# Patient Record
Sex: MALE | Race: WHITE | NOT HISPANIC OR LATINO
[De-identification: names, ages, dates, MRNs, and addresses within clinical notes are randomized per-mention and may not be internally consistent; named-entity substitution may affect disease eponyms.]

---

## 2020-03-15 ENCOUNTER — TRANSCRIPTION ENCOUNTER (OUTPATIENT)
Age: 60
End: 2020-03-15

## 2020-03-16 ENCOUNTER — RESULT REVIEW (OUTPATIENT)
Age: 60
End: 2020-03-16

## 2020-03-16 ENCOUNTER — INPATIENT (INPATIENT)
Facility: HOSPITAL | Age: 60
LOS: 8 days | Discharge: HOME | DRG: 853 | End: 2020-03-25
Attending: INTERNAL MEDICINE | Admitting: INTERNAL MEDICINE
Payer: COMMERCIAL

## 2020-03-16 VITALS
DIASTOLIC BLOOD PRESSURE: 68 MMHG | WEIGHT: 238.98 LBS | SYSTOLIC BLOOD PRESSURE: 107 MMHG | TEMPERATURE: 100 F | HEIGHT: 73 IN | HEART RATE: 120 BPM | OXYGEN SATURATION: 97 % | RESPIRATION RATE: 18 BRPM

## 2020-03-16 DIAGNOSIS — L08.9 LOCAL INFECTION OF THE SKIN AND SUBCUTANEOUS TISSUE, UNSPECIFIED: ICD-10-CM

## 2020-03-16 DIAGNOSIS — Z29.9 ENCOUNTER FOR PROPHYLACTIC MEASURES, UNSPECIFIED: ICD-10-CM

## 2020-03-16 DIAGNOSIS — E87.1 HYPO-OSMOLALITY AND HYPONATREMIA: ICD-10-CM

## 2020-03-16 DIAGNOSIS — E11.9 TYPE 2 DIABETES MELLITUS WITHOUT COMPLICATIONS: ICD-10-CM

## 2020-03-16 DIAGNOSIS — N17.9 ACUTE KIDNEY FAILURE, UNSPECIFIED: ICD-10-CM

## 2020-03-16 DIAGNOSIS — E11.10 TYPE 2 DIABETES MELLITUS WITH KETOACIDOSIS WITHOUT COMA: ICD-10-CM

## 2020-03-16 DIAGNOSIS — Z98.890 OTHER SPECIFIED POSTPROCEDURAL STATES: Chronic | ICD-10-CM

## 2020-03-16 DIAGNOSIS — A41.9 SEPSIS, UNSPECIFIED ORGANISM: ICD-10-CM

## 2020-03-16 LAB
ACETONE SERPL-MCNC: ABNORMAL
ALBUMIN SERPL ELPH-MCNC: 2.2 G/DL — LOW (ref 3.5–5)
ALBUMIN SERPL ELPH-MCNC: 2.2 G/DL — LOW (ref 3.5–5)
ALP SERPL-CCNC: 165 U/L — HIGH (ref 40–120)
ALP SERPL-CCNC: 180 U/L — HIGH (ref 40–120)
ALT FLD-CCNC: 27 U/L DA — SIGNIFICANT CHANGE UP (ref 10–60)
ALT FLD-CCNC: 28 U/L DA — SIGNIFICANT CHANGE UP (ref 10–60)
ANION GAP SERPL CALC-SCNC: 15 MMOL/L — SIGNIFICANT CHANGE UP (ref 5–17)
ANION GAP SERPL CALC-SCNC: 17 MMOL/L — SIGNIFICANT CHANGE UP (ref 5–17)
APPEARANCE UR: CLEAR — SIGNIFICANT CHANGE UP
APTT BLD: 28.4 SEC — SIGNIFICANT CHANGE UP (ref 27.5–36.3)
AST SERPL-CCNC: 25 U/L — SIGNIFICANT CHANGE UP (ref 10–40)
AST SERPL-CCNC: 34 U/L — SIGNIFICANT CHANGE UP (ref 10–40)
BACTERIA # UR AUTO: ABNORMAL /HPF
BASOPHILS # BLD AUTO: 0 K/UL — SIGNIFICANT CHANGE UP (ref 0–0.2)
BASOPHILS # BLD AUTO: 0.08 K/UL — SIGNIFICANT CHANGE UP (ref 0–0.2)
BASOPHILS NFR BLD AUTO: 0 % — SIGNIFICANT CHANGE UP (ref 0–2)
BASOPHILS NFR BLD AUTO: 0.3 % — SIGNIFICANT CHANGE UP (ref 0–2)
BILIRUB SERPL-MCNC: 0.6 MG/DL — SIGNIFICANT CHANGE UP (ref 0.2–1.2)
BILIRUB SERPL-MCNC: 0.7 MG/DL — SIGNIFICANT CHANGE UP (ref 0.2–1.2)
BILIRUB UR-MCNC: NEGATIVE — SIGNIFICANT CHANGE UP
BUN SERPL-MCNC: 16 MG/DL — SIGNIFICANT CHANGE UP (ref 7–18)
BUN SERPL-MCNC: 18 MG/DL — SIGNIFICANT CHANGE UP (ref 7–18)
CALCIUM SERPL-MCNC: 8.9 MG/DL — SIGNIFICANT CHANGE UP (ref 8.4–10.5)
CALCIUM SERPL-MCNC: 9.3 MG/DL — SIGNIFICANT CHANGE UP (ref 8.4–10.5)
CHLORIDE SERPL-SCNC: 90 MMOL/L — LOW (ref 96–108)
CHLORIDE SERPL-SCNC: 95 MMOL/L — LOW (ref 96–108)
CO2 SERPL-SCNC: 19 MMOL/L — LOW (ref 22–31)
CO2 SERPL-SCNC: 22 MMOL/L — SIGNIFICANT CHANGE UP (ref 22–31)
COLOR SPEC: YELLOW — SIGNIFICANT CHANGE UP
COMMENT - URINE: SIGNIFICANT CHANGE UP
CREAT SERPL-MCNC: 1.27 MG/DL — SIGNIFICANT CHANGE UP (ref 0.5–1.3)
CREAT SERPL-MCNC: 1.48 MG/DL — HIGH (ref 0.5–1.3)
DIFF PNL FLD: ABNORMAL
EOSINOPHIL # BLD AUTO: 0 K/UL — SIGNIFICANT CHANGE UP (ref 0–0.5)
EOSINOPHIL # BLD AUTO: 0.01 K/UL — SIGNIFICANT CHANGE UP (ref 0–0.5)
EOSINOPHIL NFR BLD AUTO: 0 % — SIGNIFICANT CHANGE UP (ref 0–6)
EOSINOPHIL NFR BLD AUTO: 0 % — SIGNIFICANT CHANGE UP (ref 0–6)
EPI CELLS # UR: SIGNIFICANT CHANGE UP /HPF
ERYTHROCYTE [SEDIMENTATION RATE] IN BLOOD: 97 MM/HR — HIGH (ref 0–20)
GLUCOSE BLDC GLUCOMTR-MCNC: 338 MG/DL — HIGH (ref 70–99)
GLUCOSE BLDC GLUCOMTR-MCNC: 341 MG/DL — HIGH (ref 70–99)
GLUCOSE BLDC GLUCOMTR-MCNC: 359 MG/DL — HIGH (ref 70–99)
GLUCOSE BLDC GLUCOMTR-MCNC: 384 MG/DL — HIGH (ref 70–99)
GLUCOSE SERPL-MCNC: 340 MG/DL — HIGH (ref 70–99)
GLUCOSE SERPL-MCNC: 507 MG/DL — CRITICAL HIGH (ref 70–99)
GLUCOSE UR QL: 1000 MG/DL
HCT VFR BLD CALC: 34.6 % — LOW (ref 39–50)
HCT VFR BLD CALC: 35.2 % — LOW (ref 39–50)
HGB BLD-MCNC: 11.4 G/DL — LOW (ref 13–17)
HGB BLD-MCNC: 11.4 G/DL — LOW (ref 13–17)
IMM GRANULOCYTES NFR BLD AUTO: 2.5 % — HIGH (ref 0–1.5)
INR BLD: 1.29 RATIO — HIGH (ref 0.88–1.16)
KETONES UR-MCNC: ABNORMAL
LACTATE SERPL-SCNC: 2.3 MMOL/L — HIGH (ref 0.7–2)
LACTATE SERPL-SCNC: 2.9 MMOL/L — HIGH (ref 0.7–2)
LEUKOCYTE ESTERASE UR-ACNC: NEGATIVE — SIGNIFICANT CHANGE UP
LIDOCAIN IGE QN: 48 U/L — LOW (ref 73–393)
LYMPHOCYTES # BLD AUTO: 1.48 K/UL — SIGNIFICANT CHANGE UP (ref 1–3.3)
LYMPHOCYTES # BLD AUTO: 2.65 K/UL — SIGNIFICANT CHANGE UP (ref 1–3.3)
LYMPHOCYTES # BLD AUTO: 5 % — LOW (ref 13–44)
LYMPHOCYTES # BLD AUTO: 9.3 % — LOW (ref 13–44)
MAGNESIUM SERPL-MCNC: 2 MG/DL — SIGNIFICANT CHANGE UP (ref 1.6–2.6)
MCHC RBC-ENTMCNC: 28.6 PG — SIGNIFICANT CHANGE UP (ref 27–34)
MCHC RBC-ENTMCNC: 29.3 PG — SIGNIFICANT CHANGE UP (ref 27–34)
MCHC RBC-ENTMCNC: 32.4 GM/DL — SIGNIFICANT CHANGE UP (ref 32–36)
MCHC RBC-ENTMCNC: 32.9 GM/DL — SIGNIFICANT CHANGE UP (ref 32–36)
MCV RBC AUTO: 88.2 FL — SIGNIFICANT CHANGE UP (ref 80–100)
MCV RBC AUTO: 88.9 FL — SIGNIFICANT CHANGE UP (ref 80–100)
MONOCYTES # BLD AUTO: 1.68 K/UL — HIGH (ref 0–0.9)
MONOCYTES # BLD AUTO: 2.08 K/UL — HIGH (ref 0–0.9)
MONOCYTES NFR BLD AUTO: 5.9 % — SIGNIFICANT CHANGE UP (ref 2–14)
MONOCYTES NFR BLD AUTO: 7 % — SIGNIFICANT CHANGE UP (ref 2–14)
NEUTROPHILS # BLD AUTO: 23.31 K/UL — HIGH (ref 1.8–7.4)
NEUTROPHILS # BLD AUTO: 25.8 K/UL — HIGH (ref 1.8–7.4)
NEUTROPHILS NFR BLD AUTO: 82 % — HIGH (ref 43–77)
NEUTROPHILS NFR BLD AUTO: 86 % — HIGH (ref 43–77)
NITRITE UR-MCNC: NEGATIVE — SIGNIFICANT CHANGE UP
NRBC # BLD: 0 /100 WBCS — SIGNIFICANT CHANGE UP (ref 0–0)
OSMOLALITY SERPL: 294 MOSMOL/KG — SIGNIFICANT CHANGE UP (ref 275–300)
PH UR: 5 — SIGNIFICANT CHANGE UP (ref 5–8)
PHOSPHATE SERPL-MCNC: 3.2 MG/DL — SIGNIFICANT CHANGE UP (ref 2.5–4.5)
PLATELET # BLD AUTO: 309 K/UL — SIGNIFICANT CHANGE UP (ref 150–400)
PLATELET # BLD AUTO: 310 K/UL — SIGNIFICANT CHANGE UP (ref 150–400)
POTASSIUM SERPL-MCNC: 4 MMOL/L — SIGNIFICANT CHANGE UP (ref 3.5–5.3)
POTASSIUM SERPL-MCNC: 4.2 MMOL/L — SIGNIFICANT CHANGE UP (ref 3.5–5.3)
POTASSIUM SERPL-SCNC: 4 MMOL/L — SIGNIFICANT CHANGE UP (ref 3.5–5.3)
POTASSIUM SERPL-SCNC: 4.2 MMOL/L — SIGNIFICANT CHANGE UP (ref 3.5–5.3)
PROT SERPL-MCNC: 8.2 G/DL — SIGNIFICANT CHANGE UP (ref 6–8.3)
PROT SERPL-MCNC: 8.2 G/DL — SIGNIFICANT CHANGE UP (ref 6–8.3)
PROT UR-MCNC: 30 MG/DL
PROTHROM AB SERPL-ACNC: 14.7 SEC — HIGH (ref 10–12.9)
RBC # BLD: 3.89 M/UL — LOW (ref 4.2–5.8)
RBC # BLD: 3.99 M/UL — LOW (ref 4.2–5.8)
RBC # FLD: 13 % — SIGNIFICANT CHANGE UP (ref 10.3–14.5)
RBC # FLD: 13.1 % — SIGNIFICANT CHANGE UP (ref 10.3–14.5)
RBC CASTS # UR COMP ASSIST: SIGNIFICANT CHANGE UP /HPF (ref 0–2)
SODIUM SERPL-SCNC: 126 MMOL/L — LOW (ref 135–145)
SODIUM SERPL-SCNC: 132 MMOL/L — LOW (ref 135–145)
SP GR SPEC: 1.02 — SIGNIFICANT CHANGE UP (ref 1.01–1.02)
UROBILINOGEN FLD QL: NEGATIVE — SIGNIFICANT CHANGE UP
WBC # BLD: 28.45 K/UL — HIGH (ref 3.8–10.5)
WBC # BLD: 29.66 K/UL — HIGH (ref 3.8–10.5)
WBC # FLD AUTO: 28.45 K/UL — HIGH (ref 3.8–10.5)
WBC # FLD AUTO: 29.66 K/UL — HIGH (ref 3.8–10.5)
WBC UR QL: SIGNIFICANT CHANGE UP /HPF (ref 0–5)

## 2020-03-16 PROCEDURE — 73590 X-RAY EXAM OF LOWER LEG: CPT | Mod: 26,RT

## 2020-03-16 PROCEDURE — 99285 EMERGENCY DEPT VISIT HI MDM: CPT

## 2020-03-16 PROCEDURE — 93923 UPR/LXTR ART STDY 3+ LVLS: CPT | Mod: 26

## 2020-03-16 PROCEDURE — 88305 TISSUE EXAM BY PATHOLOGIST: CPT | Mod: 26

## 2020-03-16 PROCEDURE — 73630 X-RAY EXAM OF FOOT: CPT | Mod: 26,RT

## 2020-03-16 RX ORDER — INSULIN LISPRO 100/ML
3 VIAL (ML) SUBCUTANEOUS
Refills: 0 | Status: DISCONTINUED | OUTPATIENT
Start: 2020-03-16 | End: 2020-03-17

## 2020-03-16 RX ORDER — INSULIN HUMAN 100 [IU]/ML
5 INJECTION, SOLUTION SUBCUTANEOUS ONCE
Refills: 0 | Status: COMPLETED | OUTPATIENT
Start: 2020-03-16 | End: 2020-03-16

## 2020-03-16 RX ORDER — HYDROMORPHONE HYDROCHLORIDE 2 MG/ML
0.5 INJECTION INTRAMUSCULAR; INTRAVENOUS; SUBCUTANEOUS EVERY 6 HOURS
Refills: 0 | Status: DISCONTINUED | OUTPATIENT
Start: 2020-03-16 | End: 2020-03-20

## 2020-03-16 RX ORDER — SODIUM CHLORIDE 9 MG/ML
1000 INJECTION, SOLUTION INTRAVENOUS
Refills: 0 | Status: DISCONTINUED | OUTPATIENT
Start: 2020-03-16 | End: 2020-03-16

## 2020-03-16 RX ORDER — ACETAMINOPHEN 500 MG
650 TABLET ORAL EVERY 6 HOURS
Refills: 0 | Status: DISCONTINUED | OUTPATIENT
Start: 2020-03-16 | End: 2020-03-16

## 2020-03-16 RX ORDER — HYDROMORPHONE HYDROCHLORIDE 2 MG/ML
0.5 INJECTION INTRAMUSCULAR; INTRAVENOUS; SUBCUTANEOUS EVERY 6 HOURS
Refills: 0 | Status: DISCONTINUED | OUTPATIENT
Start: 2020-03-16 | End: 2020-03-16

## 2020-03-16 RX ORDER — PIPERACILLIN AND TAZOBACTAM 4; .5 G/20ML; G/20ML
3.38 INJECTION, POWDER, LYOPHILIZED, FOR SOLUTION INTRAVENOUS ONCE
Refills: 0 | Status: COMPLETED | OUTPATIENT
Start: 2020-03-16 | End: 2020-03-16

## 2020-03-16 RX ORDER — HEPARIN SODIUM 5000 [USP'U]/ML
5000 INJECTION INTRAVENOUS; SUBCUTANEOUS EVERY 8 HOURS
Refills: 0 | Status: DISCONTINUED | OUTPATIENT
Start: 2020-03-16 | End: 2020-03-20

## 2020-03-16 RX ORDER — ACETAMINOPHEN 500 MG
1000 TABLET ORAL ONCE
Refills: 0 | Status: DISCONTINUED | OUTPATIENT
Start: 2020-03-16 | End: 2020-03-16

## 2020-03-16 RX ORDER — OXYCODONE AND ACETAMINOPHEN 5; 325 MG/1; MG/1
1 TABLET ORAL EVERY 6 HOURS
Refills: 0 | Status: DISCONTINUED | OUTPATIENT
Start: 2020-03-16 | End: 2020-03-16

## 2020-03-16 RX ORDER — PIPERACILLIN AND TAZOBACTAM 4; .5 G/20ML; G/20ML
3.38 INJECTION, POWDER, LYOPHILIZED, FOR SOLUTION INTRAVENOUS EVERY 8 HOURS
Refills: 0 | Status: DISCONTINUED | OUTPATIENT
Start: 2020-03-16 | End: 2020-03-16

## 2020-03-16 RX ORDER — INSULIN GLARGINE 100 [IU]/ML
10 INJECTION, SOLUTION SUBCUTANEOUS AT BEDTIME
Refills: 0 | Status: DISCONTINUED | OUTPATIENT
Start: 2020-03-16 | End: 2020-03-17

## 2020-03-16 RX ORDER — HYDROMORPHONE HYDROCHLORIDE 2 MG/ML
1 INJECTION INTRAMUSCULAR; INTRAVENOUS; SUBCUTANEOUS
Refills: 0 | Status: DISCONTINUED | OUTPATIENT
Start: 2020-03-16 | End: 2020-03-16

## 2020-03-16 RX ORDER — SODIUM CHLORIDE 9 MG/ML
1000 INJECTION INTRAMUSCULAR; INTRAVENOUS; SUBCUTANEOUS
Refills: 0 | Status: DISCONTINUED | OUTPATIENT
Start: 2020-03-16 | End: 2020-03-16

## 2020-03-16 RX ORDER — HYDROMORPHONE HYDROCHLORIDE 2 MG/ML
0.5 INJECTION INTRAMUSCULAR; INTRAVENOUS; SUBCUTANEOUS
Refills: 0 | Status: DISCONTINUED | OUTPATIENT
Start: 2020-03-16 | End: 2020-03-16

## 2020-03-16 RX ORDER — SODIUM CHLORIDE 9 MG/ML
1000 INJECTION INTRAMUSCULAR; INTRAVENOUS; SUBCUTANEOUS ONCE
Refills: 0 | Status: COMPLETED | OUTPATIENT
Start: 2020-03-16 | End: 2020-03-16

## 2020-03-16 RX ORDER — INSULIN LISPRO 100/ML
VIAL (ML) SUBCUTANEOUS AT BEDTIME
Refills: 0 | Status: DISCONTINUED | OUTPATIENT
Start: 2020-03-16 | End: 2020-03-18

## 2020-03-16 RX ORDER — DEXTROSE 50 % IN WATER 50 %
25 SYRINGE (ML) INTRAVENOUS ONCE
Refills: 0 | Status: DISCONTINUED | OUTPATIENT
Start: 2020-03-16 | End: 2020-03-20

## 2020-03-16 RX ORDER — INSULIN LISPRO 100/ML
VIAL (ML) SUBCUTANEOUS
Refills: 0 | Status: DISCONTINUED | OUTPATIENT
Start: 2020-03-16 | End: 2020-03-16

## 2020-03-16 RX ORDER — SODIUM CHLORIDE 9 MG/ML
1000 INJECTION INTRAMUSCULAR; INTRAVENOUS; SUBCUTANEOUS
Refills: 0 | Status: DISCONTINUED | OUTPATIENT
Start: 2020-03-16 | End: 2020-03-20

## 2020-03-16 RX ORDER — VANCOMYCIN HCL 1 G
1000 VIAL (EA) INTRAVENOUS ONCE
Refills: 0 | Status: COMPLETED | OUTPATIENT
Start: 2020-03-16 | End: 2020-03-16

## 2020-03-16 RX ORDER — DEXTROSE 50 % IN WATER 50 %
25 SYRINGE (ML) INTRAVENOUS ONCE
Refills: 0 | Status: DISCONTINUED | OUTPATIENT
Start: 2020-03-16 | End: 2020-03-16

## 2020-03-16 RX ORDER — OXYCODONE AND ACETAMINOPHEN 5; 325 MG/1; MG/1
1 TABLET ORAL EVERY 6 HOURS
Refills: 0 | Status: DISCONTINUED | OUTPATIENT
Start: 2020-03-16 | End: 2020-03-20

## 2020-03-16 RX ORDER — ACETAMINOPHEN 500 MG
650 TABLET ORAL EVERY 6 HOURS
Refills: 0 | Status: DISCONTINUED | OUTPATIENT
Start: 2020-03-16 | End: 2020-03-20

## 2020-03-16 RX ORDER — INSULIN LISPRO 100/ML
VIAL (ML) SUBCUTANEOUS EVERY 6 HOURS
Refills: 0 | Status: DISCONTINUED | OUTPATIENT
Start: 2020-03-16 | End: 2020-03-16

## 2020-03-16 RX ORDER — MORPHINE SULFATE 50 MG/1
4 CAPSULE, EXTENDED RELEASE ORAL ONCE
Refills: 0 | Status: DISCONTINUED | OUTPATIENT
Start: 2020-03-16 | End: 2020-03-16

## 2020-03-16 RX ORDER — ONDANSETRON 8 MG/1
4 TABLET, FILM COATED ORAL ONCE
Refills: 0 | Status: DISCONTINUED | OUTPATIENT
Start: 2020-03-16 | End: 2020-03-16

## 2020-03-16 RX ADMIN — SODIUM CHLORIDE 100 MILLILITER(S): 9 INJECTION, SOLUTION INTRAVENOUS at 17:28

## 2020-03-16 RX ADMIN — PIPERACILLIN AND TAZOBACTAM 200 GRAM(S): 4; .5 INJECTION, POWDER, LYOPHILIZED, FOR SOLUTION INTRAVENOUS at 22:47

## 2020-03-16 RX ADMIN — Medication 4: at 22:47

## 2020-03-16 RX ADMIN — HYDROMORPHONE HYDROCHLORIDE 0.5 MILLIGRAM(S): 2 INJECTION INTRAMUSCULAR; INTRAVENOUS; SUBCUTANEOUS at 22:50

## 2020-03-16 RX ADMIN — MORPHINE SULFATE 4 MILLIGRAM(S): 50 CAPSULE, EXTENDED RELEASE ORAL at 15:50

## 2020-03-16 RX ADMIN — INSULIN GLARGINE 10 UNIT(S): 100 INJECTION, SOLUTION SUBCUTANEOUS at 22:46

## 2020-03-16 RX ADMIN — MORPHINE SULFATE 4 MILLIGRAM(S): 50 CAPSULE, EXTENDED RELEASE ORAL at 13:45

## 2020-03-16 RX ADMIN — SODIUM CHLORIDE 100 MILLILITER(S): 9 INJECTION INTRAMUSCULAR; INTRAVENOUS; SUBCUTANEOUS at 22:48

## 2020-03-16 RX ADMIN — INSULIN HUMAN 5 UNIT(S): 100 INJECTION, SOLUTION SUBCUTANEOUS at 15:54

## 2020-03-16 RX ADMIN — PIPERACILLIN AND TAZOBACTAM 200 GRAM(S): 4; .5 INJECTION, POWDER, LYOPHILIZED, FOR SOLUTION INTRAVENOUS at 13:45

## 2020-03-16 RX ADMIN — SODIUM CHLORIDE 1000 MILLILITER(S): 9 INJECTION INTRAMUSCULAR; INTRAVENOUS; SUBCUTANEOUS at 13:45

## 2020-03-16 RX ADMIN — Medication 100 MILLIGRAM(S): at 15:56

## 2020-03-16 RX ADMIN — Medication 250 MILLIGRAM(S): at 18:36

## 2020-03-16 RX ADMIN — SODIUM CHLORIDE 1000 MILLILITER(S): 9 INJECTION INTRAMUSCULAR; INTRAVENOUS; SUBCUTANEOUS at 15:50

## 2020-03-16 RX ADMIN — HYDROMORPHONE HYDROCHLORIDE 0.5 MILLIGRAM(S): 2 INJECTION INTRAMUSCULAR; INTRAVENOUS; SUBCUTANEOUS at 23:20

## 2020-03-16 RX ADMIN — HEPARIN SODIUM 5000 UNIT(S): 5000 INJECTION INTRAVENOUS; SUBCUTANEOUS at 22:50

## 2020-03-16 NOTE — CONSULT NOTE ADULT - SUBJECTIVE AND OBJECTIVE BOX
HPI:      PAST MEDICAL & SURGICAL HISTORY:  No pertinent past medical history  No significant past surgical history      No Known Allergies      Meds:  acetaminophen   Tablet .. 650 milliGRAM(s) Oral every 6 hours PRN  clindamycin IVPB 900 milliGRAM(s) IV Intermittent every 8 hours  dextrose 50% Injectable 25 Gram(s) IV Push once  HYDROmorphone  Injectable 0.5 milliGRAM(s) IV Push every 6 hours PRN  insulin lispro (HumaLOG) corrective regimen sliding scale   SubCutaneous every 6 hours  lactated ringers. 1000 milliLiter(s) IV Continuous <Continuous>  oxycodone    5 mG/acetaminophen 325 mG 1 Tablet(s) Oral every 6 hours PRN  piperacillin/tazobactam IVPB. 3.375 Gram(s) IV Intermittent once  piperacillin/tazobactam IVPB.. 3.375 Gram(s) IV Intermittent every 8 hours  vancomycin  IVPB 1000 milliGRAM(s) IV Intermittent once      SOCIAL HISTORY:  Smoker:  YES / NO        PACK YEARS:                         WHEN QUIT?  ETOH use:  YES / NO               FREQUENCY / QUANTITY:  [place] Drug use:  YES / NO  Occupation:  Assisted device use (Cane / Walker):  Live with:    FAMILY HISTORY:      VITALS:  Vital Signs Last 24 Hrs  T(C): 37.4 (16 Mar 2020 17:30), Max: 37.9 (16 Mar 2020 12:27)  T(F): 99.3 (16 Mar 2020 17:30), Max: 100.3 (16 Mar 2020 12:27)  HR: 100 (16 Mar 2020 17:30) (100 - 120)  BP: 117/74 (16 Mar 2020 17:30) (107/68 - 117/74)  BP(mean): --  RR: 18 (16 Mar 2020 15:23) (18 - 18)  SpO2: 100% (16 Mar 2020 17:30) (97% - 100%)    LABS/DIAGNOSTIC TESTS:                          11.4   28.45 )-----------( 310      ( 16 Mar 2020 17:55 )             35.2     WBC Count: 28.45 K/uL ( @ 17:55)  WBC Count: 29.66 K/uL ( @ 14:55)          126<L>  |  90<L>  |  18  ----------------------------<  507<HH>  4.2   |  19<L>  |  1.48<H>    Ca    9.3      16 Mar 2020 14:30    TPro  8.2  /  Alb  2.2<L>  /  TBili  0.7  /  DBili  x   /  AST  34  /  ALT  28  /  AlkPhos  180<H>        Urinalysis Basic - ( 16 Mar 2020 17:02 )    Color: Yellow / Appearance: Clear / S.020 / pH: x  Gluc: x / Ketone: Moderate  / Bili: Negative / Urobili: Negative   Blood: x / Protein: 30 mg/dL / Nitrite: Negative   Leuk Esterase: Negative / RBC: 0-2 /HPF / WBC 0-2 /HPF   Sq Epi: x / Non Sq Epi: Few /HPF / Bacteria: Few /HPF        LIVER FUNCTIONS - ( 16 Mar 2020 14:30 )  Alb: 2.2 g/dL / Pro: 8.2 g/dL / ALK PHOS: 180 U/L / ALT: 28 U/L DA / AST: 34 U/L / GGT: x             PT/INR - ( 16 Mar 2020 13:44 )   PT: 14.7 sec;   INR: 1.29 ratio         PTT - ( 16 Mar 2020 13:44 )  PTT:28.4 sec    LACTATE:Lactate, Blood: 2.9 mmol/L ( @ 14:53)      ABG -     CULTURES:       RADIOLOGY:< from: Xray Foot AP + Lateral + Oblique, Right (20 @ 15:11) >  EXAM:  FOOT RIGHT (MINIMUM 3 VIEWS)                            PROCEDURE DATE:  2020          INTERPRETATION:  Right foot. 3 views. Patient has right foot infection.    Small posterior and inferior calcaneal spurs are noted.    There is some dorsal tarsal soft tissue swelling.    Slight arterial calcifications are noted.    There is swelling around the fifth MTP joint.    There is no definitive bone destruction or fracture.    Some soft tissue air in the site is suspect.    IMPRESSION: Gas forming infection around the right fifth MTP joint.                ZAHEER LEARY M.D., ATTENDING RADIOLOGIST  This document has been electronically signed. Mar 16 2020  3:22PM      --------------------------------------------------------------------------------------------------------------------------------    < from: Xray Tibia + Fibula 2 Views, Right (20 @ 15:37) >  EXAM:  LEG AP&LAT - RIGHT                            PROCEDURE DATE:  2020          INTERPRETATION:  Right tib-fib. 4 views. Patient has local pain and gas forming infection around the fifth MTP joint.    The bones of the lower leg are unremarkable.    IMPRESSION: Negative study.                ZAHEER LEARY M.D., ATTENDING RADIOLOGIST  This document has been electronically signed. Mar 16 2020  3:39PM          < end of copied text >      ROS  [  ] UNABLE TO ELICIT HPI:  59 yr old male with no significant PMH who presents with right foot swelling redness extending up right leg, right leg , he developed these symptoms about 1 week ago and got worse about 3-4 days ago with blackish discoloration of right 5th toe, drainage which is foul smelling and has some pain in the right foot, he thinks he might of stepped on something 2 weeks ago but is not sure. He denies any trauma to foot. He denies  any fevers or chills, no other complaints. He was found to have new onset diabetes here with very high blood sugars. He has not seen  doctor in many years. 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 He is going to go to the OR for amputation today.      PAST MEDICAL & SURGICAL HISTORY:  No pertinent past medical history  No significant past surgical history      No Known Allergies      Meds:  acetaminophen   Tablet .. 650 milliGRAM(s) Oral every 6 hours PRN  clindamycin IVPB 900 milliGRAM(s) IV Intermittent every 8 hours  dextrose 50% Injectable 25 Gram(s) IV Push once  HYDROmorphone  Injectable 0.5 milliGRAM(s) IV Push every 6 hours PRN  insulin lispro (HumaLOG) corrective regimen sliding scale   SubCutaneous every 6 hours  lactated ringers. 1000 milliLiter(s) IV Continuous <Continuous>  oxycodone    5 mG/acetaminophen 325 mG 1 Tablet(s) Oral every 6 hours PRN  piperacillin/tazobactam IVPB. 3.375 Gram(s) IV Intermittent once  piperacillin/tazobactam IVPB.. 3.375 Gram(s) IV Intermittent every 8 hours  vancomycin  IVPB 1000 milliGRAM(s) IV Intermittent once      SOCIAL HISTORY:  Smoker: quit 2 years ago  ETOH use:  socially    FAMILY HISTORY: not contributory      VITALS:  Vital Signs Last 24 Hrs  T(C): 37.4 (16 Mar 2020 17:30), Max: 37.9 (16 Mar 2020 12:27)  T(F): 99.3 (16 Mar 2020 17:30), Max: 100.3 (16 Mar 2020 12:27)  HR: 100 (16 Mar 2020 17:30) (100 - 120)  BP: 117/74 (16 Mar 2020 17:30) (107/68 - 117/74)  BP(mean): --  RR: 18 (16 Mar 2020 15:23) (18 - 18)  SpO2: 100% (16 Mar 2020 17:30) (97% - 100%)    LABS/DIAGNOSTIC TESTS:                          11.4   28.45 )-----------( 310      ( 16 Mar 2020 17:55 )             35.2     WBC Count: 28.45 K/uL ( @ 17:55)  WBC Count: 29.66 K/uL ( @ 14:55)          126<L>  |  90<L>  |  18  ----------------------------<  507<HH>  4.2   |  19<L>  |  1.48<H>    Ca    9.3      16 Mar 2020 14:30    TPro  8.2  /  Alb  2.2<L>  /  TBili  0.7  /  DBili  x   /  AST  34  /  ALT  28  /  AlkPhos  180<H>        Urinalysis Basic - ( 16 Mar 2020 17:02 )    Color: Yellow / Appearance: Clear / S.020 / pH: x  Gluc: x / Ketone: Moderate  / Bili: Negative / Urobili: Negative   Blood: x / Protein: 30 mg/dL / Nitrite: Negative   Leuk Esterase: Negative / RBC: 0-2 /HPF / WBC 0-2 /HPF   Sq Epi: x / Non Sq Epi: Few /HPF / Bacteria: Few /HPF        LIVER FUNCTIONS - ( 16 Mar 2020 14:30 )  Alb: 2.2 g/dL / Pro: 8.2 g/dL / ALK PHOS: 180 U/L / ALT: 28 U/L DA / AST: 34 U/L / GGT: x             PT/INR - ( 16 Mar 2020 13:44 )   PT: 14.7 sec;   INR: 1.29 ratio         PTT - ( 16 Mar 2020 13:44 )  PTT:28.4 sec    LACTATE:Lactate, Blood: 2.9 mmol/L ( @ 14:53)      ABG -     CULTURES:       RADIOLOGY:< from: Xray Foot AP + Lateral + Oblique, Right (20 @ 15:11) >  EXAM:  FOOT RIGHT (MINIMUM 3 VIEWS)                            PROCEDURE DATE:  2020          INTERPRETATION:  Right foot. 3 views. Patient has right foot infection.    Small posterior and inferior calcaneal spurs are noted.    There is some dorsal tarsal soft tissue swelling.    Slight arterial calcifications are noted.    There is swelling around the fifth MTP joint.    There is no definitive bone destruction or fracture.    Some soft tissue air in the site is suspect.    IMPRESSION: Gas forming infection around the right fifth MTP joint.                ZAHEER LEARY M.D., ATTENDING RADIOLOGIST  This document has been electronically signed. Mar 16 2020  3:22PM      --------------------------------------------------------------------------------------------------------------------------------    < from: Xray Tibia + Fibula 2 Views, Right (20 @ 15:37) >  EXAM:  LEG AP&LAT - RIGHT                            PROCEDURE DATE:  2020          INTERPRETATION:  Right tib-fib. 4 views. Patient has local pain and gas forming infection around the fifth MTP joint.    The bones of the lower leg are unremarkable.    IMPRESSION: Negative study.                ZAHEER LEARY M.D., ATTENDING RADIOLOGIST  This document has been electronically signed. Mar 16 2020  3:39PM          < end of copied text >      ROS  [  ] UNABLE TO ELICIT

## 2020-03-16 NOTE — CONSULT NOTE ADULT - GASTROINTESTINAL DETAILS
no rigidity/no masses palpable/no guarding/bowel sounds normal/no organomegaly/soft/nontender/no distention

## 2020-03-16 NOTE — CONSULT NOTE ADULT - ATTENDING COMMENTS
patient for I and d of abscess with probable amputation partial foot - pt understand s risks associated with infection including further amputation and loss limb .life as risks   no guarantees given or implied

## 2020-03-16 NOTE — ED PROVIDER NOTE - PROGRESS NOTE DETAILS
patient endorse he no longer see his pmd, looking for new one, admitted to unattached, podiatry seen patient.

## 2020-03-16 NOTE — ED PROVIDER NOTE - OBJECTIVE STATEMENT
60 y/o M presents to ED complaining of foot swelling, pain, and redness x4d. Pt states there is pain to palpitation on foot but no trauma. Pt denies fever, nausea, cough or any other complaints. NKDA.

## 2020-03-16 NOTE — H&P ADULT - PROBLEM SELECTOR PLAN 3
Pt p/w  , Corrected AG 21.5 , +ve acetone in blood and urine   -s/p 2 l NS and 5 u of r- insulin    BS trending down , AG closed   - will c/w LR at 100cc/hr   - will start on lantus 10 U HS as pt is insulin naive   - c/w HSS   - NPO for OR , resume diet after OR   - endo consult Dr Wilkerson Pt p/w  , Corrected AG 21.5 , +ve acetone in blood and urine   -s/p 2 l NS and 5 u of r- insulin    BS trending down , AG closed   - will c/w LR at 100cc/hr   - will start on lantus 10 U HS as pt is insulin naive   - Humalog 3 U pre meals and HSS   - NPO for OR , resume diet after OR   - endo consult Dr Wilkerson

## 2020-03-16 NOTE — CONSULT NOTE ADULT - RS GEN PE MLT RESP DETAILS PC
good air movement/clear to auscultation bilaterally/no rales/no rhonchi/breath sounds equal/no wheezes

## 2020-03-16 NOTE — H&P ADULT - PROBLEM SELECTOR PLAN 1
Pt p/w fever , wbc 30 k , lactate 2.9 tachycardia 120   - s/p 1 dose of zosyn and 2 L NS in ED   - will start on  cc /hr   - will start on clindamycin 900mg q8 and zosyn , given 1 dose of vanco   - repeat lactate 2.3   - f/u BCx   -f/u lactate in am   - ID Dr Mcadams

## 2020-03-16 NOTE — H&P ADULT - PROBLEM SELECTOR PLAN 2
pt p/w R foot cellulitis , with draining wound and discoloration of 5th digit   - Xray R foot : gas forming infection of 5th digit   - ESR 97   - s/p bedside debridement   - pt went for urgent OR due to gas in tissue   - s/p 1 dose of zosyn in ED   - given 1 dose of vanco and started on clindamycin 900 mg q8 and zosyn IV   - f/u wound Cx and BCx  - pain control   - IVF   -f/u ABIs   - Pod consult  - F/u MRI of the foot to r/o OM

## 2020-03-16 NOTE — H&P ADULT - HISTORY OF PRESENT ILLNESS
59 y M from home,walks independently with no Significant PMHx and PSHx of Right achilles tendon repair presented to ED from Urgent care for worsening Right foot swelling,discoloration  and pain since 4 days . Pt states that he thinks he stepped on a splinter or some small foreign body about 1 1/2 week ago. He states that at that time he had small amount of discomfort which has been getting worst since Thursday. Since thursday his foot has become more discolored and he see some discharge coming out. Pt went to urgent care this morning and doctor asked him to go to ED.  Also pt c/o nocturia , increased thirst since couple of months now. Pt Denies any Fever, chills, SOB, CP , cough , abd pain , nausea,vomiting ,diarrhea , dysuria or any other complaint.     ED course : febrile to 100.3 F , tachycardic to 120 , /68   Labs : Pt found to have  , wbc 30 k , Na 126, HCO3 19 , AG 17 , lactate 2.9 , ESR 97   ECG : Sinus tachy cardia   Xray R foot : Gas forming infection around the right fifth MTP joint.  Pt got 1 dose of Zosyn and 2 L NS and 5 U regular insulin in ED     SH :  Former smoker 1 PPD x 20 yrs ,quit 2 yrs ago . Drinks beer 2x/wk . Denies any drug use

## 2020-03-16 NOTE — CONSULT NOTE ADULT - SUBJECTIVE AND OBJECTIVE BOX
S : 59y year old Male seen at bedside for Right foot swelling, rednees and blister formation. Pt states that he thinks he stepped on a splinter or some small foreign body about 1 1/2 week ago. He states that at that time he had small amount of discomfort which has been getting worst since Thursday. Pt states he went to urgent care this morning and doctor asked him to go to ED. Pt denies any F/C/N/V/SOB.    PMH: Pt denies any PMH but states that his fingerstick in hospital is high  PSH:     Allergies:No Known Allergies      Labs: pending      WBC Trend      Chem              T(F): 100.3 (03-16-20 @ 12:27), Max: 100.3 (03-16-20 @ 12:27)  HR: 120 (03-16-20 @ 12:27) (120 - 120)  BP: 107/68 (03-16-20 @ 12:27) (107/68 - 107/68)  RR: 18 (03-16-20 @ 12:27) (18 - 18)  SpO2: 97% (03-16-20 @ 12:27) (97% - 97%)  Wt(kg): --    O:   General: Pleasant  male NAD & AOX3.      RLE focused    Right foot edema , erythema and 5th toe discoloration, erythema edema  extending to entire foot . interdigital maceration mainly located at 4th interspace, red streaking at ant aspect of right leg. blistering with serosanguineous fluid at dorsal and plantar foot.   Vascular: Dorsalis Pedis and Posterior Tibial pulses 0/4.  Capillary re-fill time less then 3 seconds digits 1-4 , 5th toe could not be assessed due to discoloration   Neuro: Protective sensation intact to the level of the digits bilateral.      A: Right  foot infection      P:   Chart reviewed and Patient evaluated  Discussed diagnosis and treatment with patient  Possible bed side I&d pending labs  wound culture .....  dressing  X-rays reviewed : pending read, showing soft tissue emphysema at 4th interspace and plantar lateral 5th toe right foot  Continue with IV antibiotics   Ordered JESUS  Weight bearing as tolerated  Discussed importance of daily foot examinations and proper shoe gear and to importance of lower Fasting Blood Glucose levels.   Podiatry will follow while in house.  Discussed with Attending Dr Eng S : 59y year old Male seen at bedside for Right foot swelling, rednees and blister formation. Pt states that he thinks he stepped on a splinter or some small foreign body about 1 1/2 week ago. He states that at that time he had small amount of discomfort which has been getting worst since Thursday. Pt states he went to urgent care this morning and doctor asked him to go to ED. Pt denies any F/C/N/V/SOB.    PMH: Pt denies any PMH but states that his fingerstick in hospital is high  PSH:     Allergies:No Known Allergies      Labs:                      11.4   29.66 )-----------( 309      ( 16 Mar 2020 14:55 )             34.6   03-16    126<L>  |  90<L>  |  18  ----------------------------<  507<HH>  4.2   |  19<L>  |  1.48<H>    Ca    9.3      16 Mar 2020 14:30    TPro  8.2  /  Alb  2.2<L>  /  TBili  0.7  /  DBili  x   /  AST  34  /  ALT  28  /  AlkPhos  180<H              T(F): 100.3 (03-16-20 @ 12:27), Max: 100.3 (03-16-20 @ 12:27)  HR: 120 (03-16-20 @ 12:27) (120 - 120)  BP: 107/68 (03-16-20 @ 12:27) (107/68 - 107/68)  RR: 18 (03-16-20 @ 12:27) (18 - 18)  SpO2: 97% (03-16-20 @ 12:27) (97% - 97%)  Wt(kg): --    O:   General: Pleasant  male NAD & AOX3.      RLE focused    Right foot edema , erythema and 5th toe discoloration, erythema edema  extending to entire foot . interdigital maceration mainly located at 4th interspace, red streaking at ant aspect of right leg. blistering with serosanguineous fluid at dorsal and plantar foot.   Vascular: Dorsalis Pedis and Posterior Tibial pulses 0/4.  Capillary re-fill time less then 3 seconds digits 1-4 , 5th toe could not be assessed due to discoloration   Neuro: Protective sensation intact to the level of the digits bilateral.      A: Right  foot infection      P:   Chart reviewed and Patient evaluated  Discussed diagnosis and treatment with patient  bed side I&d right foot  in ED with drainage of 10 cc of pus  wound culture sent to lab  dressing  X-rays reviewed : pending read, showing soft tissue emphysema at 4th interspace and plantar lateral 5th toe right foot  Continue with IV antibiotics   Pt to NPO  Possible OR tonight for I and D  Consent in place  Rec MRI with contrast right Lowe extremity  Ordered JESUS  Weight bearing as tolerated  Discussed importance of daily foot examinations and proper shoe gear and to importance of lower Fasting Blood Glucose levels.   Podiatry will follow while in house.  Discussed with Attending Dr Eng

## 2020-03-16 NOTE — H&P ADULT - ATTENDING COMMENTS
Patient seen and examined.   59 y M from home,walks independently with no Significant PMHx and PSHx of Right achilles tendon repair presented to ED from Urgent care for worsening Right foot swelling,discoloration  and pain since 4 days     T(C): 37.4 (03-16-20 @ 17:30), Max: 37.9 (03-16-20 @ 12:27)  HR: 100 (03-16-20 @ 17:30) (100 - 120)  BP: 117/74 (03-16-20 @ 17:30) (107/68 - 117/74)  RR: 18 (03-16-20 @ 15:23) (18 - 18)  SpO2: 100% (03-16-20 @ 17:30) (97% - 100%)    Right foot edema , erythema and 5th toe discoloration, erythema edema  extending to entire foot . interdigital maceration mainly located at 4th interspace, red streaking at ant aspect of right leg. blistering with serosanguineous fluid at dorsal and plantar foot.     Sepsis  Rt foot cellulitis  Mild DKA   KESHIA     IV abx, Clindamycin and add Vanco x 1 dose. Podiatry consult appreciated, recommend OR debridement.   Spoke to ID will add Zosyn, follow up blood and OR cultures. MRI r/o Osteomyelitis to determine length of abx course.  DKA- s/p iv regular Insulin, add Lantus and pre meal, iv hydration. Endo consult called, follow up recs.   KESHIA resolving  Hyponatremia- Pseudohyponatremia from Hyperglycemia.

## 2020-03-16 NOTE — H&P ADULT - ASSESSMENT
Right foot edema , erythema and 5th toe discoloration, erythema edema  extending to entire foot . interdigital maceration mainly located at 4th interspace, red streaking at ant aspect of right leg. blistering with serosanguineous fluid at dorsal and plantar foot.     Pt will be admitted to [facility] for R foot infection  and mild DKA

## 2020-03-16 NOTE — H&P ADULT - PROBLEM SELECTOR PLAN 5
Cr 1.48  - likely 2/2 sepsis and dehydration   - improved to 1.2 s/p IVF   -c/w IVF   - f/u BMP in am

## 2020-03-16 NOTE — CONSULT NOTE ADULT - SKIN COMMENTS
erythema ++ of right foot and streaking of lymphatics of right leg, wet gangrene of right 5th toe with some superficial gangrene of the dorsum of right foot and even plantar aspect of foot

## 2020-03-16 NOTE — H&P ADULT - NSHPPHYSICALEXAM_GEN_ALL_CORE
GENERAL: NAD  EYES: EOMI, PERRLA,   NECK: Supple, No JVD  CHEST/LUNG: Clear to auscultation b/l  No rales, rhonchi, wheezing   HEART: Regular rate and rhythm; No murmurs, +ve S1 S2   ABDOMEN: Soft, Nontender, Nondistended; Bowel sounds present  NERVOUS SYSTEM:  Alert & Oriented X3, normal sensations and normal strength     EXTREMITIES:   No clubbing, cyanosis  SKIN : Right foot edema , erythema and 5th toe discoloration,  blister with serosanguineous fluid at dorsal and plantar foot.   LYMPH NODES : non palpable   PSYCH: normal mood and affect

## 2020-03-16 NOTE — H&P ADULT - PROBLEM SELECTOR PLAN 4
Pseudo hypo Na 126 given hyperglycemia   - rpt 132   - c/w IVF and Tx of hyperglycemia   - f/u Na in am

## 2020-03-16 NOTE — ED ADULT NURSE NOTE - NSIMPLEMENTINTERV_GEN_ALL_ED
Implemented All Universal Safety Interventions:  [place] to call system. Call bell, personal items and telephone within reach. Instruct patient to call for assistance. Room bathroom lighting operational. Non-slip footwear when patient is off stretcher. Physically safe environment: no spills, clutter or unnecessary equipment. Stretcher in lowest position, wheels locked, appropriate side rails in place.

## 2020-03-16 NOTE — ED PROVIDER NOTE - CLINICAL SUMMARY MEDICAL DECISION MAKING FREE TEXT BOX
Patient presenting with foot infection, concern for nec fasc, will obtain lab, xray, abx, podiatry consult

## 2020-03-16 NOTE — H&P ADULT - PROBLEM SELECTOR PLAN 7
IMPROVE VTE Individual Risk Assessment  RISK                                                                Points  [  ] Previous VTE                                                  3  [  ] Thrombophilia                                               2  [  ] Lower limb paralysis                                      2  [  ] Current Cancer                                              2         (within 6 months)  [  ] Immobilization > 24 hrs                                1  [  ] ICU/CCU stay > 24 hours                              1  [  ] Age > 60                                                      1  IMPROVE VTE Score ___2______  DVT ppx : will start on HSQ   GI ppx : no need

## 2020-03-17 DIAGNOSIS — E11.65 TYPE 2 DIABETES MELLITUS WITH HYPERGLYCEMIA: ICD-10-CM

## 2020-03-17 LAB
24R-OH-CALCIDIOL SERPL-MCNC: <5 NG/ML — LOW (ref 30–80)
ALBUMIN SERPL ELPH-MCNC: 1.9 G/DL — LOW (ref 3.5–5)
ALP SERPL-CCNC: 181 U/L — HIGH (ref 40–120)
ALT FLD-CCNC: 31 U/L DA — SIGNIFICANT CHANGE UP (ref 10–60)
ANION GAP SERPL CALC-SCNC: 15 MMOL/L — SIGNIFICANT CHANGE UP (ref 5–17)
AST SERPL-CCNC: 39 U/L — SIGNIFICANT CHANGE UP (ref 10–40)
BASOPHILS # BLD AUTO: 0.06 K/UL — SIGNIFICANT CHANGE UP (ref 0–0.2)
BASOPHILS NFR BLD AUTO: 0.2 % — SIGNIFICANT CHANGE UP (ref 0–2)
BILIRUB SERPL-MCNC: 0.7 MG/DL — SIGNIFICANT CHANGE UP (ref 0.2–1.2)
BUN SERPL-MCNC: 14 MG/DL — SIGNIFICANT CHANGE UP (ref 7–18)
CALCIUM SERPL-MCNC: 7.9 MG/DL — LOW (ref 8.4–10.5)
CHLORIDE SERPL-SCNC: 98 MMOL/L — SIGNIFICANT CHANGE UP (ref 96–108)
CHOLEST SERPL-MCNC: 126 MG/DL — SIGNIFICANT CHANGE UP (ref 10–199)
CO2 SERPL-SCNC: 19 MMOL/L — LOW (ref 22–31)
CREAT SERPL-MCNC: 1.04 MG/DL — SIGNIFICANT CHANGE UP (ref 0.5–1.3)
CRP SERPL-MCNC: 36.53 MG/DL — HIGH (ref 0–0.4)
EOSINOPHIL # BLD AUTO: 0.04 K/UL — SIGNIFICANT CHANGE UP (ref 0–0.5)
EOSINOPHIL NFR BLD AUTO: 0.2 % — SIGNIFICANT CHANGE UP (ref 0–6)
GLUCOSE BLDC GLUCOMTR-MCNC: 285 MG/DL — HIGH (ref 70–99)
GLUCOSE BLDC GLUCOMTR-MCNC: 292 MG/DL — HIGH (ref 70–99)
GLUCOSE BLDC GLUCOMTR-MCNC: 295 MG/DL — HIGH (ref 70–99)
GLUCOSE BLDC GLUCOMTR-MCNC: 355 MG/DL — HIGH (ref 70–99)
GLUCOSE BLDC GLUCOMTR-MCNC: 376 MG/DL — HIGH (ref 70–99)
GLUCOSE SERPL-MCNC: 270 MG/DL — HIGH (ref 70–99)
HBA1C BLD-MCNC: 14.4 % — HIGH (ref 4–5.6)
HBA1C BLD-MCNC: 14.6 % — HIGH (ref 4–5.6)
HCT VFR BLD CALC: 32.4 % — LOW (ref 39–50)
HCV AB S/CO SERPL IA: 0.14 S/CO — SIGNIFICANT CHANGE UP (ref 0–0.99)
HCV AB SERPL-IMP: SIGNIFICANT CHANGE UP
HDLC SERPL-MCNC: 19 MG/DL — LOW
HGB BLD-MCNC: 10.2 G/DL — LOW (ref 13–17)
IMM GRANULOCYTES NFR BLD AUTO: 2.3 % — HIGH (ref 0–1.5)
LACTATE SERPL-SCNC: 1.4 MMOL/L — SIGNIFICANT CHANGE UP (ref 0.7–2)
LIPID PNL WITH DIRECT LDL SERPL: 71 MG/DL — SIGNIFICANT CHANGE UP
LYMPHOCYTES # BLD AUTO: 2.38 K/UL — SIGNIFICANT CHANGE UP (ref 1–3.3)
LYMPHOCYTES # BLD AUTO: 9.3 % — LOW (ref 13–44)
MAGNESIUM SERPL-MCNC: 2 MG/DL — SIGNIFICANT CHANGE UP (ref 1.6–2.6)
MCHC RBC-ENTMCNC: 28.2 PG — SIGNIFICANT CHANGE UP (ref 27–34)
MCHC RBC-ENTMCNC: 31.5 GM/DL — LOW (ref 32–36)
MCV RBC AUTO: 89.5 FL — SIGNIFICANT CHANGE UP (ref 80–100)
MONOCYTES # BLD AUTO: 1.41 K/UL — HIGH (ref 0–0.9)
MONOCYTES NFR BLD AUTO: 5.5 % — SIGNIFICANT CHANGE UP (ref 2–14)
NEUTROPHILS # BLD AUTO: 21.11 K/UL — HIGH (ref 1.8–7.4)
NEUTROPHILS NFR BLD AUTO: 82.5 % — HIGH (ref 43–77)
NRBC # BLD: 0 /100 WBCS — SIGNIFICANT CHANGE UP (ref 0–0)
PHOSPHATE SERPL-MCNC: 2 MG/DL — LOW (ref 2.5–4.5)
PLATELET # BLD AUTO: 317 K/UL — SIGNIFICANT CHANGE UP (ref 150–400)
POTASSIUM SERPL-MCNC: 3.9 MMOL/L — SIGNIFICANT CHANGE UP (ref 3.5–5.3)
POTASSIUM SERPL-SCNC: 3.9 MMOL/L — SIGNIFICANT CHANGE UP (ref 3.5–5.3)
PROT SERPL-MCNC: 7.3 G/DL — SIGNIFICANT CHANGE UP (ref 6–8.3)
RBC # BLD: 3.62 M/UL — LOW (ref 4.2–5.8)
RBC # FLD: 13.2 % — SIGNIFICANT CHANGE UP (ref 10.3–14.5)
SODIUM SERPL-SCNC: 132 MMOL/L — LOW (ref 135–145)
TOTAL CHOLESTEROL/HDL RATIO MEASUREMENT: 6.6 RATIO — SIGNIFICANT CHANGE UP (ref 3.4–9.6)
TRIGL SERPL-MCNC: 180 MG/DL — HIGH (ref 10–149)
TSH SERPL-MCNC: 0.98 UU/ML — SIGNIFICANT CHANGE UP (ref 0.34–4.82)
VIT B12 SERPL-MCNC: 556 PG/ML — SIGNIFICANT CHANGE UP (ref 232–1245)
WBC # BLD: 25.59 K/UL — HIGH (ref 3.8–10.5)
WBC # FLD AUTO: 25.59 K/UL — HIGH (ref 3.8–10.5)

## 2020-03-17 PROCEDURE — 99221 1ST HOSP IP/OBS SF/LOW 40: CPT

## 2020-03-17 PROCEDURE — 73620 X-RAY EXAM OF FOOT: CPT | Mod: 26,RT

## 2020-03-17 PROCEDURE — 73719 MRI LOWER EXTREMITY W/DYE: CPT | Mod: 26,RT

## 2020-03-17 RX ORDER — INFLUENZA VIRUS VACCINE 15; 15; 15; 15 UG/.5ML; UG/.5ML; UG/.5ML; UG/.5ML
0.5 SUSPENSION INTRAMUSCULAR ONCE
Refills: 0 | Status: DISCONTINUED | OUTPATIENT
Start: 2020-03-17 | End: 2020-03-25

## 2020-03-17 RX ORDER — PIPERACILLIN AND TAZOBACTAM 4; .5 G/20ML; G/20ML
INJECTION, POWDER, LYOPHILIZED, FOR SOLUTION INTRAVENOUS
Refills: 0 | Status: DISCONTINUED | OUTPATIENT
Start: 2020-03-17 | End: 2020-03-20

## 2020-03-17 RX ORDER — PIPERACILLIN AND TAZOBACTAM 4; .5 G/20ML; G/20ML
3.38 INJECTION, POWDER, LYOPHILIZED, FOR SOLUTION INTRAVENOUS ONCE
Refills: 0 | Status: DISCONTINUED | OUTPATIENT
Start: 2020-03-17 | End: 2020-03-17

## 2020-03-17 RX ORDER — PIPERACILLIN AND TAZOBACTAM 4; .5 G/20ML; G/20ML
3.38 INJECTION, POWDER, LYOPHILIZED, FOR SOLUTION INTRAVENOUS EVERY 8 HOURS
Refills: 0 | Status: DISCONTINUED | OUTPATIENT
Start: 2020-03-17 | End: 2020-03-17

## 2020-03-17 RX ORDER — PIPERACILLIN AND TAZOBACTAM 4; .5 G/20ML; G/20ML
3.38 INJECTION, POWDER, LYOPHILIZED, FOR SOLUTION INTRAVENOUS EVERY 8 HOURS
Refills: 0 | Status: DISCONTINUED | OUTPATIENT
Start: 2020-03-17 | End: 2020-03-20

## 2020-03-17 RX ORDER — PIPERACILLIN AND TAZOBACTAM 4; .5 G/20ML; G/20ML
3.38 INJECTION, POWDER, LYOPHILIZED, FOR SOLUTION INTRAVENOUS ONCE
Refills: 0 | Status: COMPLETED | OUTPATIENT
Start: 2020-03-17 | End: 2020-03-17

## 2020-03-17 RX ORDER — INSULIN LISPRO 100/ML
6 VIAL (ML) SUBCUTANEOUS
Refills: 0 | Status: DISCONTINUED | OUTPATIENT
Start: 2020-03-17 | End: 2020-03-18

## 2020-03-17 RX ORDER — INSULIN GLARGINE 100 [IU]/ML
25 INJECTION, SOLUTION SUBCUTANEOUS AT BEDTIME
Refills: 0 | Status: DISCONTINUED | OUTPATIENT
Start: 2020-03-17 | End: 2020-03-18

## 2020-03-17 RX ORDER — INSULIN LISPRO 100/ML
VIAL (ML) SUBCUTANEOUS
Refills: 0 | Status: DISCONTINUED | OUTPATIENT
Start: 2020-03-17 | End: 2020-03-18

## 2020-03-17 RX ADMIN — INSULIN GLARGINE 25 UNIT(S): 100 INJECTION, SOLUTION SUBCUTANEOUS at 21:27

## 2020-03-17 RX ADMIN — Medication 3 UNIT(S): at 08:23

## 2020-03-17 RX ADMIN — PIPERACILLIN AND TAZOBACTAM 25 GRAM(S): 4; .5 INJECTION, POWDER, LYOPHILIZED, FOR SOLUTION INTRAVENOUS at 21:27

## 2020-03-17 RX ADMIN — HEPARIN SODIUM 5000 UNIT(S): 5000 INJECTION INTRAVENOUS; SUBCUTANEOUS at 21:26

## 2020-03-17 RX ADMIN — HEPARIN SODIUM 5000 UNIT(S): 5000 INJECTION INTRAVENOUS; SUBCUTANEOUS at 14:55

## 2020-03-17 RX ADMIN — Medication 5: at 12:24

## 2020-03-17 RX ADMIN — OXYCODONE AND ACETAMINOPHEN 1 TABLET(S): 5; 325 TABLET ORAL at 21:58

## 2020-03-17 RX ADMIN — HYDROMORPHONE HYDROCHLORIDE 0.5 MILLIGRAM(S): 2 INJECTION INTRAMUSCULAR; INTRAVENOUS; SUBCUTANEOUS at 05:48

## 2020-03-17 RX ADMIN — HEPARIN SODIUM 5000 UNIT(S): 5000 INJECTION INTRAVENOUS; SUBCUTANEOUS at 05:48

## 2020-03-17 RX ADMIN — OXYCODONE AND ACETAMINOPHEN 1 TABLET(S): 5; 325 TABLET ORAL at 21:28

## 2020-03-17 RX ADMIN — Medication 3 UNIT(S): at 12:24

## 2020-03-17 RX ADMIN — Medication 3: at 21:27

## 2020-03-17 RX ADMIN — Medication 5: at 17:01

## 2020-03-17 RX ADMIN — OXYCODONE AND ACETAMINOPHEN 1 TABLET(S): 5; 325 TABLET ORAL at 10:00

## 2020-03-17 RX ADMIN — Medication 100 MILLIGRAM(S): at 17:00

## 2020-03-17 RX ADMIN — OXYCODONE AND ACETAMINOPHEN 1 TABLET(S): 5; 325 TABLET ORAL at 09:09

## 2020-03-17 RX ADMIN — HYDROMORPHONE HYDROCHLORIDE 0.5 MILLIGRAM(S): 2 INJECTION INTRAMUSCULAR; INTRAVENOUS; SUBCUTANEOUS at 06:18

## 2020-03-17 RX ADMIN — Medication 6 UNIT(S): at 17:01

## 2020-03-17 RX ADMIN — PIPERACILLIN AND TAZOBACTAM 25 GRAM(S): 4; .5 INJECTION, POWDER, LYOPHILIZED, FOR SOLUTION INTRAVENOUS at 13:36

## 2020-03-17 RX ADMIN — SODIUM CHLORIDE 100 MILLILITER(S): 9 INJECTION INTRAMUSCULAR; INTRAVENOUS; SUBCUTANEOUS at 05:48

## 2020-03-17 NOTE — DIETITIAN INITIAL EVALUATION ADULT. - ADD RECOMMEND
Add MVI/minerals/Vit. C 500 mg BID for wound healing & Zinc Sulfate 220mg once daily as needed as medically feasible

## 2020-03-17 NOTE — PROGRESS NOTE ADULT - SUBJECTIVE AND OBJECTIVE BOX
PGY 2 Note discussed with primary attending.    Patient is a 59y old  Male who presents with a chief complaint of Right foot swelling (17 Mar 2020 12:16)      INTERVAL HPI/OVERNIGHT EVENTS: patient underwent I&D in OR on 3/16 with partial amputation of right 5th toe. Assessed bedside, offers no complaints.     MEDICATIONS  (STANDING):  clindamycin IVPB 900 milliGRAM(s) IV Intermittent once  clindamycin IVPB 900 milliGRAM(s) IV Intermittent every 8 hours  clindamycin IVPB      dextrose 50% Injectable 25 Gram(s) IV Push once  heparin  Injectable 5000 Unit(s) SubCutaneous every 8 hours  influenza   Vaccine 0.5 milliLiter(s) IntraMuscular once  insulin glargine Injectable (LANTUS) 25 Unit(s) SubCutaneous at bedtime  insulin lispro (HumaLOG) corrective regimen sliding scale   SubCutaneous three times a day before meals  insulin lispro (HumaLOG) corrective regimen sliding scale   SubCutaneous at bedtime  insulin lispro Injectable (HumaLOG) 6 Unit(s) SubCutaneous three times a day before meals  piperacillin/tazobactam IVPB.. 3.375 Gram(s) IV Intermittent once  piperacillin/tazobactam IVPB.. 3.375 Gram(s) IV Intermittent every 8 hours  piperacillin/tazobactam IVPB..      sodium chloride 0.9%. 1000 milliLiter(s) (100 mL/Hr) IV Continuous <Continuous>    MEDICATIONS  (PRN):  acetaminophen   Tablet .. 650 milliGRAM(s) Oral every 6 hours PRN Temp greater or equal to 38C (100.4F), Mild Pain (1 - 3)  HYDROmorphone  Injectable 0.5 milliGRAM(s) IV Push every 6 hours PRN Severe Pain (7 - 10)  oxycodone    5 mG/acetaminophen 325 mG 1 Tablet(s) Oral every 6 hours PRN Moderate Pain (4 - 6)      Allergies    No Known Allergies    Intolerances        REVIEW OF SYSTEMS:  CONSTITUTIONAL: No fever, weight loss, or fatigue  RESPIRATORY: No cough, wheezing, chills or hemoptysis; No shortness of breath  CARDIOVASCULAR: No chest pain, palpitations, dizziness, or leg swelling  GASTROINTESTINAL: No abdominal or epigastric pain. No nausea, vomiting, or hematemesis; No diarrhea or constipation. No melena or hematochezia.  NEUROLOGICAL: No headaches, memory loss, loss of strength, numbness, or tremors  SKIN: wound on right foot    Vital Signs Last 24 Hrs  T(C): 37.4 (17 Mar 2020 05:35), Max: 37.7 (17 Mar 2020 01:57)  T(F): 99.3 (17 Mar 2020 05:35), Max: 99.9 (17 Mar 2020 01:57)  HR: 107 (17 Mar 2020 11:36) (94 - 107)  BP: 131/75 (17 Mar 2020 11:36) (97/67 - 136/97)  BP(mean): 97 (16 Mar 2020 21:26) (77 - 108)  RR: 18 (17 Mar 2020 05:35) (16 - 20)  SpO2: 93% (17 Mar 2020 11:36) (92% - 100%)    PHYSICAL EXAM:  GENERAL: NAD, well-groomed, well-developed, obese  HEAD:  Atraumatic, Normocephalic  CHEST/LUNG: Clear to auscultation bilaterally; No rales, rhonchi, wheezing, or rubs  HEART: Regular rate and rhythm; No murmurs, rubs, or gallops  ABDOMEN: Soft, Nontender, Nondistended; Bowel sounds present  NERVOUS SYSTEM:  Alert & Oriented X3, Good concentration; Motor Strength 5/5 B/L   EXTREMITIES:  2+ Peripheral Pulses, No clubbing, cyanosis, or edema; right foot dressed, dressing c/i                        10.2   25.59 )-----------( 317      ( 17 Mar 2020 07:46 )             32.4     03-17    132<L>  |  98  |  14  ----------------------------<  270<H>  3.9   |  19<L>  |  1.04    Ca    7.9<L>      17 Mar 2020 07:46  Phos  2.0     03-17  Mg     2.0     03-17    TPro  7.3  /  Alb  1.9<L>  /  TBili  0.7  /  DBili  x   /  AST  39  /  ALT  31  /  AlkPhos  181<H>  03-17    PT/INR - ( 16 Mar 2020 13:44 )   PT: 14.7 sec;   INR: 1.29 ratio         PTT - ( 16 Mar 2020 13:44 )  PTT:28.4 sec  Urinalysis Basic - ( 16 Mar 2020 17:02 )    Color: Yellow / Appearance: Clear / S.020 / pH: x  Gluc: x / Ketone: Moderate  / Bili: Negative / Urobili: Negative   Blood: x / Protein: 30 mg/dL / Nitrite: Negative   Leuk Esterase: Negative / RBC: 0-2 /HPF / WBC 0-2 /HPF   Sq Epi: x / Non Sq Epi: Few /HPF / Bacteria: Few /HPF      CAPILLARY BLOOD GLUCOSE      POCT Blood Glucose.: 376 mg/dL (17 Mar 2020 11:52)  POCT Blood Glucose.: 295 mg/dL (17 Mar 2020 08:06)  POCT Blood Glucose.: 285 mg/dL (17 Mar 2020 06:32)  POCT Blood Glucose.: 341 mg/dL (16 Mar 2020 21:57)  POCT Blood Glucose.: 359 mg/dL (16 Mar 2020 20:32)  POCT Blood Glucose.: 338 mg/dL (16 Mar 2020 19:08)  POCT Blood Glucose.: 384 mg/dL (16 Mar 2020 16:47)      RADIOLOGY & ADDITIONAL TESTS: < from: MR Foot w/ IV Cont, Right (20 @ 10:55) >    PROCEDURE DATE:  2020          INTERPRETATION:    MRI OF THE RIGHT FOOT    CLINICAL INFORMATION: Right foot pain and swelling. Evaluate for osteomyelitis  TECHNIQUE: Multiplanar, multisequence MRI was obtained of the RIGHT FOOT. The study was performed before and after the intravenous administration of 11 ml Gadavist (4 ml discarded) .  COMPARISON: Right foot radiographs 6T 2020.    FINDINGS:    SOFT TISSUES: Patient status post partial amputation of the fifth ray to the level of the mid diaphysis of the fifth metatarsal. There is a large postsurgical soft tissue defect along the lateral aspect of the foot with associated packing in the bandage material. No focal drainable fluid collections or abscesses are identified.  BONE MARROW: As mentioned above, patient is status post partial amputation of the fifth ray to the level of the diaphysis of the fifth metatarsal. Marrow signal is within normal limits. No abnormal edema, loss of T1 hyperintense signal, or enhancement.    MUSCLES AND TENDONS: Diffuse edema and atrophy of the imaged musculature.  SYNOVIUM/ JOINT FLUID: No large joint effusion.  CARTILAGE AND SUBCHONDRAL BONE: Articular cartilage is maintained.  LIGAMENTS AND CAPSULAR STRUCTURES: Lisfranc ligament is intact.      IMPRESSION:  1.  No MR evidence of acute osteomyelitis.  2.  Sequelae of partial amputation of the fifth ray, as described above.      Imaging Personally Reviewed:  [ x ] YES  [ ] NO    Consultant(s) Notes Reviewed:  [ x ] YES  [ ] NO

## 2020-03-17 NOTE — PROGRESS NOTE ADULT - SUBJECTIVE AND OBJECTIVE BOX
59y Male    Meds:  clindamycin IVPB 900 milliGRAM(s) IV Intermittent once  clindamycin IVPB 900 milliGRAM(s) IV Intermittent every 8 hours  clindamycin IVPB      piperacillin/tazobactam IVPB.. 3.375 Gram(s) IV Intermittent every 8 hours  piperacillin/tazobactam IVPB..        Allergies    No Known Allergies    Intolerances        VITALS:  Vital Signs Last 24 Hrs  T(C): 36.7 (17 Mar 2020 14:13), Max: 37.7 (17 Mar 2020 01:57)  T(F): 98 (17 Mar 2020 14:13), Max: 99.9 (17 Mar 2020 01:57)  HR: 100 (17 Mar 2020 14:13) (94 - 107)  BP: 133/77 (17 Mar 2020 14:13) (97/67 - 136/97)  BP(mean): 97 (16 Mar 2020 21:26) (77 - 108)  RR: 18 (17 Mar 2020 14:13) (16 - 20)  SpO2: 95% (17 Mar 2020 14:13) (92% - 100%)    LABS/DIAGNOSTIC TESTS:                          10.2   25.59 )-----------( 317      ( 17 Mar 2020 07:46 )             32.4     Lactate, Blood: 1.4 mmol/L (03-17 @ 07:46)  Lactate, Blood: 2.3 mmol/L (03-16 @ 17:55)      03-17    132<L>  |  98  |  14  ----------------------------<  270<H>  3.9   |  19<L>  |  1.04    Ca    7.9<L>      17 Mar 2020 07:46  Phos  2.0     03-17  Mg     2.0     03-17    TPro  7.3  /  Alb  1.9<L>  /  TBili  0.7  /  DBili  x   /  AST  39  /  ALT  31  /  AlkPhos  181<H>  03-17      LIVER FUNCTIONS - ( 17 Mar 2020 07:46 )  Alb: 1.9 g/dL / Pro: 7.3 g/dL / ALK PHOS: 181 U/L / ALT: 31 U/L DA / AST: 39 U/L / GGT: x             CULTURES:       RADIOLOGY:< from: MR Foot w/ IV Cont, Right (03.17.20 @ 10:55) >  EXAM:  MR FOOT IC RT                            PROCEDURE DATE:  03/17/2020          INTERPRETATION:    MRI OF THE RIGHT FOOT    CLINICAL INFORMATION: Right foot pain and swelling. Evaluate for osteomyelitis  TECHNIQUE: Multiplanar, multisequence MRI was obtained of the RIGHT FOOT. The study was performed before and after the intravenous administration of 11 ml Gadavist (4 ml discarded) .  COMPARISON: Right foot radiographs 6T March 2020.    FINDINGS:    SOFT TISSUES: Patient status post partial amputation of the fifth ray to the level of the mid diaphysis of the fifth metatarsal. There is a large postsurgical soft tissue defect along the lateral aspect of the foot with associated packing in the bandage material. No focal drainable fluid collections or abscesses are identified.  BONE MARROW: As mentioned above, patient is status post partial amputation of the fifth ray to the level of the diaphysis of the fifth metatarsal. Marrow signal is within normal limits. No abnormal edema, loss of T1 hyperintense signal, or enhancement.    MUSCLES AND TENDONS: Diffuse edema and atrophy of the imaged musculature.  SYNOVIUM/ JOINT FLUID: No large joint effusion.  CARTILAGE AND SUBCHONDRAL BONE: Articular cartilage is maintained.  LIGAMENTS AND CAPSULAR STRUCTURES: Lisfranc ligament is intact.      IMPRESSION:  1.  No MR evidence of acute osteomyelitis.  2.  Sequelae of partial amputation of the fifth ray, as described above.                KERRIE MCNEAL M.D., ATTENDING RADIOLOGIST  This document has been electronically signed. Mar 17 2020 12:08PM        -----------------------------------------------------------------------------    < from: Xray Foot AP + Lateral, Right (03.17.20 @ 10:52) >  EXAM:  FOOT 2VIEWS RT                            PROCEDURE DATE:  03/17/2020          INTERPRETATION:  Right foot    HISTORY: Postop    Comparison: 3/16/2020      Three views of the right foot show amputation of the right fifth toe at the mid metatarsal level.    IMPRESSION: Postop changes.    Thank you for this referral.                LAVELLE ROY M.D., ATTENDING RADIOLOGIST  This document has been electronically signed. Mar 17 2020 11:01AM          < end of copied text >      ROS:  [  ] UNABLE TO ELICIT 59y Male who is doing better post right 5th toe amputation and partial ray amputation and wound was left open as he has to go back to the OR for further debridement on Thursday , he has less swelling and redness of his right foot and leg, he has very little to no pain at the surgical site, he has no fevers , chills or diarrhea. His wbc count has decreased a little.    Meds:  clindamycin IVPB 900 milliGRAM(s) IV Intermittent once  clindamycin IVPB 900 milliGRAM(s) IV Intermittent every 8 hours  clindamycin IVPB      piperacillin/tazobactam IVPB.. 3.375 Gram(s) IV Intermittent every 8 hours  piperacillin/tazobactam IVPB..        Allergies    No Known Allergies    Intolerances        VITALS:  Vital Signs Last 24 Hrs  T(C): 36.7 (17 Mar 2020 14:13), Max: 37.7 (17 Mar 2020 01:57)  T(F): 98 (17 Mar 2020 14:13), Max: 99.9 (17 Mar 2020 01:57)  HR: 100 (17 Mar 2020 14:13) (94 - 107)  BP: 133/77 (17 Mar 2020 14:13) (97/67 - 136/97)  BP(mean): 97 (16 Mar 2020 21:26) (77 - 108)  RR: 18 (17 Mar 2020 14:13) (16 - 20)  SpO2: 95% (17 Mar 2020 14:13) (92% - 100%)    LABS/DIAGNOSTIC TESTS:                          10.2   25.59 )-----------( 317      ( 17 Mar 2020 07:46 )             32.4     Lactate, Blood: 1.4 mmol/L (03-17 @ 07:46)  Lactate, Blood: 2.3 mmol/L (03-16 @ 17:55)      03-17    132<L>  |  98  |  14  ----------------------------<  270<H>  3.9   |  19<L>  |  1.04    Ca    7.9<L>      17 Mar 2020 07:46  Phos  2.0     03-17  Mg     2.0     03-17    TPro  7.3  /  Alb  1.9<L>  /  TBili  0.7  /  DBili  x   /  AST  39  /  ALT  31  /  AlkPhos  181<H>  03-17      LIVER FUNCTIONS - ( 17 Mar 2020 07:46 )  Alb: 1.9 g/dL / Pro: 7.3 g/dL / ALK PHOS: 181 U/L / ALT: 31 U/L DA / AST: 39 U/L / GGT: x             CULTURES:       RADIOLOGY:< from: MR Foot w/ IV Cont, Right (03.17.20 @ 10:55) >  EXAM:  MR FOOT IC RT                            PROCEDURE DATE:  03/17/2020          INTERPRETATION:    MRI OF THE RIGHT FOOT    CLINICAL INFORMATION: Right foot pain and swelling. Evaluate for osteomyelitis  TECHNIQUE: Multiplanar, multisequence MRI was obtained of the RIGHT FOOT. The study was performed before and after the intravenous administration of 11 ml Gadavist (4 ml discarded) .  COMPARISON: Right foot radiographs 6T March 2020.    FINDINGS:    SOFT TISSUES: Patient status post partial amputation of the fifth ray to the level of the mid diaphysis of the fifth metatarsal. There is a large postsurgical soft tissue defect along the lateral aspect of the foot with associated packing in the bandage material. No focal drainable fluid collections or abscesses are identified.  BONE MARROW: As mentioned above, patient is status post partial amputation of the fifth ray to the level of the diaphysis of the fifth metatarsal. Marrow signal is within normal limits. No abnormal edema, loss of T1 hyperintense signal, or enhancement.    MUSCLES AND TENDONS: Diffuse edema and atrophy of the imaged musculature.  SYNOVIUM/ JOINT FLUID: No large joint effusion.  CARTILAGE AND SUBCHONDRAL BONE: Articular cartilage is maintained.  LIGAMENTS AND CAPSULAR STRUCTURES: Lisfranc ligament is intact.      IMPRESSION:  1.  No MR evidence of acute osteomyelitis.  2.  Sequelae of partial amputation of the fifth ray, as described above.                KERRIE MCNEAL M.D., ATTENDING RADIOLOGIST  This document has been electronically signed. Mar 17 2020 12:08PM        -----------------------------------------------------------------------------    < from: Xray Foot AP + Lateral, Right (03.17.20 @ 10:52) >  EXAM:  FOOT 2VIEWS RT                            PROCEDURE DATE:  03/17/2020          INTERPRETATION:  Right foot    HISTORY: Postop    Comparison: 3/16/2020      Three views of the right foot show amputation of the right fifth toe at the mid metatarsal level.    IMPRESSION: Postop changes.    Thank you for this referral.                LAVELLE ROY M.D., ATTENDING RADIOLOGIST  This document has been electronically signed. Mar 17 2020 11:01AM          < end of copied text >      ROS:  [  ] UNABLE TO ELICIT

## 2020-03-17 NOTE — PHYSICAL THERAPY INITIAL EVALUATION ADULT - PERTINENT HX OF CURRENT PROBLEM, REHAB EVAL
Pt. presented to ED from Urgent care for worsening Right foot swelling, discoloration  and pain for 4 days .

## 2020-03-17 NOTE — DIETITIAN INITIAL EVALUATION ADULT. - PERTINENT MEDS FT
MEDICATIONS  (STANDING):  clindamycin IVPB 900 milliGRAM(s) IV Intermittent once  clindamycin IVPB 900 milliGRAM(s) IV Intermittent every 8 hours  clindamycin IVPB      dextrose 50% Injectable 25 Gram(s) IV Push once  heparin  Injectable 5000 Unit(s) SubCutaneous every 8 hours  influenza   Vaccine 0.5 milliLiter(s) IntraMuscular once  insulin glargine Injectable (LANTUS) 25 Unit(s) SubCutaneous at bedtime  insulin lispro (HumaLOG) corrective regimen sliding scale   SubCutaneous three times a day before meals  insulin lispro (HumaLOG) corrective regimen sliding scale   SubCutaneous at bedtime  insulin lispro Injectable (HumaLOG) 6 Unit(s) SubCutaneous three times a day before meals  piperacillin/tazobactam IVPB.. 3.375 Gram(s) IV Intermittent every 8 hours  piperacillin/tazobactam IVPB..      sodium chloride 0.9%. 1000 milliLiter(s) (100 mL/Hr) IV Continuous <Continuous>    MEDICATIONS  (PRN):  acetaminophen   Tablet .. 650 milliGRAM(s) Oral every 6 hours PRN Temp greater or equal to 38C (100.4F), Mild Pain (1 - 3)  HYDROmorphone  Injectable 0.5 milliGRAM(s) IV Push every 6 hours PRN Severe Pain (7 - 10)  oxycodone    5 mG/acetaminophen 325 mG 1 Tablet(s) Oral every 6 hours PRN Moderate Pain (4 - 6)

## 2020-03-17 NOTE — PROGRESS NOTE ADULT - ASSESSMENT
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

## 2020-03-17 NOTE — PHYSICAL THERAPY INITIAL EVALUATION ADULT - FOLLOWS COMMANDS/ANSWERS QUESTIONS, REHAB EVAL
Calling regarding: Since the pt's appt w/PCP had to be canc for tomorrow because the dr will not be available, the pt would like the 4/17/19 lab results mailed today.  Pt does not know which physician to est with and stated that she may not even see an [place] physician.      Caller: Pt    Timeframe for callback: Call back not required     100% of the time

## 2020-03-17 NOTE — PROGRESS NOTE ADULT - PROBLEM SELECTOR PLAN 4
Pseudo hypo Na 126 given hyperglycemia   - rpt 132   - c/w IVF and Tx of hyperglycemia   - monitor sodium

## 2020-03-17 NOTE — PROGRESS NOTE ADULT - ASSESSMENT
Right foot edema , erythema and 5th toe discoloration, erythema edema  extending to entire foot . interdigital maceration mainly located at 4th interspace, red streaking at ant aspect of right leg. blistering with serosanguineous fluid at dorsal and plantar foot.     Pt will be admitted to [facility] for R foot infection  and mild DKA

## 2020-03-17 NOTE — DIETITIAN INITIAL EVALUATION ADULT. - SIGNS/SYMPTOMS
Lack o prior exposure to accurate information on diabetes/meal planning & A1C - 14.4% Lack of prior exposure to accurate information on diabetes/meal planning & A1C - 14.4%

## 2020-03-17 NOTE — PHYSICAL THERAPY INITIAL EVALUATION ADULT - DID THE PATIENT HAVE SURGERY?
yes/S/P Partial amputation of fifth ray of right foot by open approach and Incision and drainage, abscess, bone

## 2020-03-17 NOTE — CONSULT NOTE ADULT - ATTENDING COMMENTS
Seen ex'ed dw'ed PA  Severe R foot infection  Art perf decent by non-invasives  Rec:   fu podiatry plans/further debridement as indicated.  Will follow up wound healing status  Poss Additional vasc intervention if needed once local infection controlled

## 2020-03-17 NOTE — PHYSICAL THERAPY INITIAL EVALUATION ADULT - DISCHARGE DISPOSITION, PT EVAL
home/Pt. presents without gross impairment and is independent with functional mobility. Skilled therapeutic PT intervention not indicated at this time. Pt. will not be placed on PT program.

## 2020-03-17 NOTE — DIETITIAN INITIAL EVALUATION ADULT. - FACTORS AFF FOOD INTAKE
sepsis, foot infection, gas gangrene of foot, diabetes mellitus, KESHIA, hyponatremia, h/o achilles tendon repair/other (specify)

## 2020-03-17 NOTE — CONSULT NOTE ADULT - ASSESSMENT
60 yo with newly diagnosed DM s/p I&D and partial ray amputation of R 5th digit for gas gangrene.  ABIs/PVRs reviewed - fair blood flow to right foot  1. No acute vascular surgery intervention at this time  2. Podiatry f/u for wound care  3. Will follow  4. D/w Dr. Yoon and agreed
59 y M from home,walks independently with no Significant PMHx and PSHx of Right achilles tendon repair presented to ED from Urgent care for worsening Right foot swelling, discoloration  and pain since 4 days. Found to have un controlled DM. Denies previuos hx of dm. pt c/o nocturia , increased thirst since couple of months now.
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

## 2020-03-17 NOTE — CONSULT NOTE ADULT - SUBJECTIVE AND OBJECTIVE BOX
Patient is a 59y old  Male who presents with a chief complaint of Right foot swelling (16 Mar 2020 18:08)      HPI:  59 y M from home,walks independently with no Significant PMHx and PSHx of Right achilles tendon repair presented to ED from Urgent care for worsening Right foot swelling,discoloration  and pain since 4 days . Pt states that he thinks he stepped on a splinter or some small foreign body about 1 1/2 week ago. He states that at that time he had small amount of discomfort which has been getting worst since Thursday. Since thursday his foot has become more discolored and he see some discharge coming out. Pt went to urgent care this morning and doctor asked him to go to ED.  Also pt c/o nocturia , increased thirst since couple of months now. Pt Denies any Fever, chills, SOB, CP , cough , abd pain , nausea,vomiting ,diarrhea , dysuria or any other complaint.     ED course : febrile to 100.3 F , tachycardic to 120 , /68   Labs : Pt found to have  , wbc 30 k , Na 126, HCO3 19 , AG 17 , lactate 2.9 , ESR 97   ECG : Sinus tachy cardia   Xray R foot : Gas forming infection around the right fifth MTP joint.  Pt got 1 dose of Zosyn and 2 L NS and 5 U regular insulin in ED     SH :  Former smoker 1 PPD x 20 yrs ,quit 2 yrs ago . Drinks beer 2x/wk . Denies any drug use (16 Mar 2020 16:19)      PAST MEDICAL & SURGICAL HISTORY:  No pertinent past medical history  H/O Achilles tendon repair         MEDICATIONS  (STANDING):  dextrose 50% Injectable 25 Gram(s) IV Push once  heparin  Injectable 5000 Unit(s) SubCutaneous every 8 hours  influenza   Vaccine 0.5 milliLiter(s) IntraMuscular once  insulin glargine Injectable (LANTUS) 10 Unit(s) SubCutaneous at bedtime  insulin lispro (HumaLOG) corrective regimen sliding scale   SubCutaneous at bedtime  insulin lispro Injectable (HumaLOG) 3 Unit(s) SubCutaneous three times a day before meals  sodium chloride 0.9%. 1000 milliLiter(s) (100 mL/Hr) IV Continuous <Continuous>    MEDICATIONS  (PRN):  acetaminophen   Tablet .. 650 milliGRAM(s) Oral every 6 hours PRN Temp greater or equal to 38C (100.4F), Mild Pain (1 - 3)  HYDROmorphone  Injectable 0.5 milliGRAM(s) IV Push every 6 hours PRN Severe Pain (7 - 10)  oxycodone    5 mG/acetaminophen 325 mG 1 Tablet(s) Oral every 6 hours PRN Moderate Pain (4 - 6)      FAMILY HISTORY:  No pertinent family history in first degree relatives      SOCIAL HISTORY:      REVIEW OF SYSTEMS:  CONSTITUTIONAL: No fever, weight loss, or fatigue  EYES: No eye pain, visual disturbances, or discharge  ENT:  No difficulty hearing, tinnitus, vertigo; No sinus or throat pain  NECK: No pain or stiffness  RESPIRATORY: No cough, wheezing, chills or hemoptysis; No Shortness of Breath  CARDIOVASCULAR: No chest pain, palpitations, passing out, dizziness, or leg swelling  GASTROINTESTINAL: No abdominal or epigastric pain. No nausea, vomiting, or hematemesis; No diarrhea or constipation. No melena or hematochezia.  GENITOURINARY: No dysuria, frequency, hematuria, or incontinence  NEUROLOGICAL: No headaches, memory loss, loss of strength, numbness, or tremors  SKIN: No itching, burning, rashes, or lesions   LYMPH Nodes: No enlarged glands  ENDOCRINE: No heat or cold intolerance; No hair loss  MUSCULOSKELETAL: No joint pain or swelling; No muscle, back, or extremity pain  PSYCHIATRIC: No depression, anxiety, mood swings, or difficulty sleeping  HEME/LYMPH: No easy bruising, or bleeding gums  ALLERGY AND IMMUNOLOGIC: No hives or eczema	        Vital Signs Last 24 Hrs  T(C): 37.4 (17 Mar 2020 05:35), Max: 37.9 (16 Mar 2020 12:27)  T(F): 99.3 (17 Mar 2020 05:35), Max: 100.3 (16 Mar 2020 12:27)  HR: 101 (17 Mar 2020 05:35) (94 - 120)  BP: 109/72 (17 Mar 2020 05:35) (97/67 - 136/97)  BP(mean): 97 (16 Mar 2020 21:26) (77 - 108)  RR: 18 (17 Mar 2020 05:35) (16 - 20)  SpO2: 95% (17 Mar 2020 05:35) (95% - 100%)      Constitutional:    NC/AT:    HEENT:    Neck:  No JVD, bruits or thyromegaly    Respiratory:  Clear without rales or rhonchi    Cardiovascular:  RR without murmur, rub or gallop.    Gastrointestinal: Soft without hepatosplenomegaly.    Extremities: without cyanosis, clubbing or edema.    Neurological:  Oriented   x      . No gross sensory or motor defects.        LABS:                        10.2   25.59 )-----------( 317      ( 17 Mar 2020 07:46 )             32.4     03-17    132<L>  |  98  |  14  ----------------------------<  270<H>  3.9   |  19<L>  |  1.04    Ca    7.9<L>      17 Mar 2020 07:46  Phos  2.0     03-17  Mg     2.0     03-17    TPro  7.3  /  Alb  1.9<L>  /  TBili  0.7  /  DBili  x   /  AST  39  /  ALT  31  /  AlkPhos  181<H>  03-17        PT/INR - ( 16 Mar 2020 13:44 )   PT: 14.7 sec;   INR: 1.29 ratio         PTT - ( 16 Mar 2020 13:44 )  PTT:28.4 sec  Urinalysis Basic - ( 16 Mar 2020 17:02 )    Color: Yellow / Appearance: Clear / S.020 / pH: x  Gluc: x / Ketone: Moderate  / Bili: Negative / Urobili: Negative   Blood: x / Protein: 30 mg/dL / Nitrite: Negative   Leuk Esterase: Negative / RBC: 0-2 /HPF / WBC 0-2 /HPF   Sq Epi: x / Non Sq Epi: Few /HPF / Bacteria: Few /HPF      CAPILLARY BLOOD GLUCOSE      POCT Blood Glucose.: 295 mg/dL (17 Mar 2020 08:06)  POCT Blood Glucose.: 285 mg/dL (17 Mar 2020 06:32)  POCT Blood Glucose.: 341 mg/dL (16 Mar 2020 21:57)  POCT Blood Glucose.: 359 mg/dL (16 Mar 2020 20:32)  POCT Blood Glucose.: 338 mg/dL (16 Mar 2020 19:08)  POCT Blood Glucose.: 384 mg/dL (16 Mar 2020 16:47)      RADIOLOGY & ADDITIONAL STUDIES: Patient is a 59y old  Male who presents with a chief complaint of Right foot swelling (16 Mar 2020 18:08)      HPI:  59 y M from home,walks independently with no Significant PMHx and PSHx of Right achilles tendon repair presented to ED from Urgent care for worsening Right foot swelling,discoloration  and pain since 4 days . Pt states that he thinks he stepped on a splinter or some small foreign body about 1 1/2 week ago. He states that at that time he had small amount of discomfort which has been getting worst since Thursday. Since thursday his foot has become more discolored and he see some discharge coming out. Pt went to urgent care this morning and doctor asked him to go to ED.  Also pt c/o nocturia , increased thirst since couple of months now. Pt Denies any Fever, chills, SOB, CP , cough , abd pain , nausea,vomiting ,diarrhea , dysuria or any other complaint.     ED course : febrile to 100.3 F , tachycardic to 120 , /68   Labs : Pt found to have  , wbc 30 k , Na 126, HCO3 19 , AG 17 , lactate 2.9 , ESR 97   ECG : Sinus tachy cardia   Xray R foot : Gas forming infection around the right fifth MTP joint.  Pt got 1 dose of Zosyn and 2 L NS and 5 U regular insulin in ED     SH :  Former smoker 1 PPD x 20 yrs ,quit 2 yrs ago . Drinks beer 2x/wk . Denies any drug use (16 Mar 2020 16:19)      PAST MEDICAL & SURGICAL HISTORY:  No pertinent past medical history  H/O Achilles tendon repair         MEDICATIONS  (STANDING):  dextrose 50% Injectable 25 Gram(s) IV Push once  heparin  Injectable 5000 Unit(s) SubCutaneous every 8 hours  influenza   Vaccine 0.5 milliLiter(s) IntraMuscular once  insulin glargine Injectable (LANTUS) 10 Unit(s) SubCutaneous at bedtime  insulin lispro (HumaLOG) corrective regimen sliding scale   SubCutaneous at bedtime  insulin lispro Injectable (HumaLOG) 3 Unit(s) SubCutaneous three times a day before meals  sodium chloride 0.9%. 1000 milliLiter(s) (100 mL/Hr) IV Continuous <Continuous>    MEDICATIONS  (PRN):  acetaminophen   Tablet .. 650 milliGRAM(s) Oral every 6 hours PRN Temp greater or equal to 38C (100.4F), Mild Pain (1 - 3)  HYDROmorphone  Injectable 0.5 milliGRAM(s) IV Push every 6 hours PRN Severe Pain (7 - 10)  oxycodone    5 mG/acetaminophen 325 mG 1 Tablet(s) Oral every 6 hours PRN Moderate Pain (4 - 6)      FAMILY HISTORY:  No pertinent family history in first degree relatives      SOCIAL HISTORY:      REVIEW OF SYSTEMS:  CONSTITUTIONAL: No fever, weight loss, or fatigue  EYES: No eye pain, visual disturbances, or discharge  ENT:  No difficulty hearing, tinnitus, vertigo; No sinus or throat pain  NECK: No pain or stiffness  RESPIRATORY: No cough, wheezing, chills or hemoptysis; No Shortness of Breath  CARDIOVASCULAR: No chest pain, palpitations, passing out, dizziness, or leg swelling  GASTROINTESTINAL: No abdominal or epigastric pain. No nausea, vomiting, or hematemesis; No diarrhea or constipation. No melena or hematochezia.  GENITOURINARY: No dysuria, frequency, hematuria, or incontinence  NEUROLOGICAL: No headaches, memory loss, loss of strength, numbness, or tremors  SKIN: No itching, burning, rashes, or lesions   LYMPH Nodes: No enlarged glands  ENDOCRINE: No heat or cold intolerance; No hair loss  MUSCULOSKELETAL: No joint pain or swelling; No muscle, back, or extremity pain  PSYCHIATRIC: No depression, anxiety, mood swings, or difficulty sleeping  HEME/LYMPH: No easy bruising, or bleeding gums  ALLERGY AND IMMUNOLOGIC: No hives or eczema	        Vital Signs Last 24 Hrs  T(C): 37.4 (17 Mar 2020 05:35), Max: 37.9 (16 Mar 2020 12:27)  T(F): 99.3 (17 Mar 2020 05:35), Max: 100.3 (16 Mar 2020 12:27)  HR: 101 (17 Mar 2020 05:35) (94 - 120)  BP: 109/72 (17 Mar 2020 05:35) (97/67 - 136/97)  BP(mean): 97 (16 Mar 2020 21:26) (77 - 108)  RR: 18 (17 Mar 2020 05:35) (16 - 20)  SpO2: 95% (17 Mar 2020 05:35) (95% - 100%)      Constitutional:    HEENT: nad    Neck:  No JVD, bruits or thyromegaly    Respiratory:  Clear without rales or rhonchi    Cardiovascular:  RR without murmur, rub or gallop.    Gastrointestinal: Soft without hepatosplenomegaly.    Extremities: without cyanosis, clubbing or edema.    Neurological:  Oriented   x   3   . No gross sensory or motor defects.        LABS:                        10.2   25.59 )-----------( 317      ( 17 Mar 2020 07:46 )             32.4     03-17    132<L>  |  98  |  14  ----------------------------<  270<H>  3.9   |  19<L>  |  1.04    Ca    7.9<L>      17 Mar 2020 07:46  Phos  2.0     03-  Mg     2.0     -17    TPro  7.3  /  Alb  1.9<L>  /  TBili  0.7  /  DBili  x   /  AST  39  /  ALT  31  /  AlkPhos  181<H>  03-17        PT/INR - ( 16 Mar 2020 13:44 )   PT: 14.7 sec;   INR: 1.29 ratio         PTT - ( 16 Mar 2020 13:44 )  PTT:28.4 sec  Urinalysis Basic - ( 16 Mar 2020 17:02 )    Color: Yellow / Appearance: Clear / S.020 / pH: x  Gluc: x / Ketone: Moderate  / Bili: Negative / Urobili: Negative   Blood: x / Protein: 30 mg/dL / Nitrite: Negative   Leuk Esterase: Negative / RBC: 0-2 /HPF / WBC 0-2 /HPF   Sq Epi: x / Non Sq Epi: Few /HPF / Bacteria: Few /HPF      Hemoglobin A1C, Whole Blood (20 @ 14:48)    Hemoglobin A1C, Whole Blood: 14.6           CAPILLARY BLOOD GLUCOSE      POCT Blood Glucose.: 295 mg/dL (17 Mar 2020 08:06)  POCT Blood Glucose.: 285 mg/dL (17 Mar 2020 06:32)  POCT Blood Glucose.: 341 mg/dL (16 Mar 2020 21:57)  POCT Blood Glucose.: 359 mg/dL (16 Mar 2020 20:32)  POCT Blood Glucose.: 338 mg/dL (16 Mar 2020 19:08)  POCT Blood Glucose.: 384 mg/dL (16 Mar 2020 16:47)      RADIOLOGY & ADDITIONAL STUDIES:

## 2020-03-17 NOTE — DIETITIAN INITIAL EVALUATION ADULT. - OTHER INFO
Patient from home lives alone. Visited pt. alert, reports po intake good, mostly consumes 2 meals with light dinner at home, denies nausea/vomiting or diarrhea, stated  Lbs x 2yrs, pt. voiced "this is first time he found out he is diabetic" since admitted to hospital & aware of high A1C levels. Patient accepted nutrition education & copies on My Plate Planner with carbohydrate counting, reviewed & reinforced information given, encourage pt. to follow up with PCP/Endocrinologist once discharged. Presently pt. consuming >50% of meals & tolerating, observed lunch tray at bedside, s/p partial 5th ray amputation with I&D, right foot necrotizing fascitis, possible second OR debridement, ongoing wound care & followed by Podiatry, discussed with RN. Patient from home lives alone. Visited pt. alert, reports po intake good, mostly consumes 2 meals with light dinner at home, denies nausea/vomiting or diarrhea, stated  Lbs x 2yrs, pt. voiced "this is first time he found out he is diabetic" since admitted to hospital also aware of his high A1C levels. Patient accepted nutrition education & copies on My Plate Planner with carbohydrate counting, reviewed & reinforced information given, encourage pt. to follow up with PCP/Endocrinologist once discharged. Presently pt. consuming >50% of meals & tolerating, observed lunch tray at bedside, s/p partial 5th ray amputation with I&D, right foot necrotizing fascitis, possible second OR debridement, ongoing wound care & followed by Podiatry, discussed with RN. Patient from home lives alone. Visited pt. alert, reports po intake good, mostly consumes 2 meals with light dinner at home, denies nausea/vomiting or diarrhea, stated  Lbs x 2yrs, pt. voiced "this is first time he found out he is diabetic" since admitted to hospital also aware of his high A1C levels. Patient accepted nutrition education & copies on My Plate Planner with carbohydrate counting, reviewed & reinforced information given, encourage pt. to follow up with PCP/Endocrinologist once discharged. Presently pt. consuming >50% of meals & tolerating, observed lunch tray at bedside, s/p partial 5th ray amputation with I&D, right foot necrotizing fascitis, possible second OR debridement, ongoing wound care & followed by Podiatry, discussed with RN/MD. Patient from home lives alone. Visited pt. alert, reports po intake good, mostly consumes 2 meals with light dinner at home, denies nausea/vomiting or diarrhea, stated  Lbs x 2yrs, pt. voiced "this is the first time he found out he is diabetic" since admitted to hospital also aware of his high A1C levels. Patient accepted nutrition education & copies on My Plate Planner with carbohydrate counting, reviewed & reinforced information given, encourage pt. to follow up with PCP/Endocrinologist once discharged. Presently pt. consuming >50% of meals & tolerating, observed lunch tray at bedside, s/p partial 5th ray amputation with I&D, right foot necrotizing fascitis, possible second OR debridement, ongoing wound care & followed by Podiatry, discussed with RN/MD. Patient from home lives alone. Visited pt. alert, reports po intake good, mostly consumes 2 meals with light dinner at home, denies nausea/vomiting or diarrhea, stated  Lbs x 2yrs, pt. voiced "this is the first time he found out he is diabetic" since admitted to hospital also aware of his high A1C levels. Patient accepted nutrition education & copies on My Plate Planner with carbohydrate counting, reviewed & reinforced information given, encourage pt. to follow up with PCP/Endocrinologist once discharged. Presently pt. consuming >50% of meals & tolerating, observed lunch tray at bedside, s/p partial 5th ray amputation with I&D, right foot necrotizing fascitis, possible second OR debridement, ongoing wound care & followed by Podiatry, Endo/team consulted, discussed with RN/MD.

## 2020-03-17 NOTE — PROGRESS NOTE ADULT - PROBLEM SELECTOR PLAN 7
IMPROVE VTE Individual Risk Assessment  RISK                                                                Points  [  ] Previous VTE                                                  3  [  ] Thrombophilia                                               2  [  ] Lower limb paralysis                                      2  [  ] Current Cancer                                              2         (within 6 months)  [  ] Immobilization > 24 hrs                                1  [  ] ICU/CCU stay > 24 hours                              1  [  ] Age > 60                                                      1  IMPROVE VTE Score ___2______  DVT ppx :  HSQ   GI ppx : no need

## 2020-03-17 NOTE — CONSULT NOTE ADULT - PROBLEM SELECTOR RECOMMENDATION 9
new onset with severe hyperglycemia  change lantus to 25 units  add huamlog 6 ac tid  fsg ac and hs  nutrition eval  dm teaching   d/w hs

## 2020-03-17 NOTE — PROGRESS NOTE ADULT - PROBLEM SELECTOR PLAN 3
Pt p/w  , Corrected AG 21.5 , +ve acetone in blood and urine; a1c noted 14.4  -fs now in range of 270 to 350, AG 15 (corrected to 20)  -increase dose of lantus to 25, humalog to 6u premeal  -continue hss, will increase to moderate scale  -endo Dr Wilkerson

## 2020-03-17 NOTE — PHYSICAL THERAPY INITIAL EVALUATION ADULT - GENERAL OBSERVATIONS, REHAB EVAL
Pt. found lying supine in NAD; right foot dressing/ace wrap c/d/i. Pt. cooperative and motivated during eval.

## 2020-03-17 NOTE — CONSULT NOTE ADULT - SUBJECTIVE AND OBJECTIVE BOX
Vascular Surgery Consultation Note    Patient is a 59y old  Male who presents with a chief complaint of Right foot swelling (17 Mar 2020 10:07)      HPI:  59 y M no significant PMHx and PSHx of Right achilles tendon repair admitted for gas gangrene of Right foot.  Pt states he had right foot swelling, discoloration  and pain since 4 days . Pt states that he thinks he stepped on a splinter or  about 1 1/2 week ago. He states at that time he had a small amount of discomfort which has been getting progressively worse and associated with redness and purulent discharge. Pt had an xray which showed gas at base of 5th digit of right foot.  Pt admits to h/o nocturia and increased thirst for several months. Pt states he has not seen a doctor for many years. Pt denies any difficulty ambulating prior to admission. He denies claudication, pain at rest or numbness of his feet.     Pt underwent Right foot I&D and partial ray amputation of 5th digit with podiatry on 3/16. Currently, pt denies fever, chills.  Pain in right foot much improved since surgery. Pt is a former smoker - he quit 2 months ago.       PAST MEDICAL & SURGICAL HISTORY:  No pertinent past medical history  H/O Achilles tendon repair      Allergies    No Known Allergies    Intolerances        MEDICATIONS  (STANDING):  clindamycin IVPB      clindamycin IVPB 900 milliGRAM(s) IV Intermittent once  clindamycin IVPB 900 milliGRAM(s) IV Intermittent every 8 hours  dextrose 50% Injectable 25 Gram(s) IV Push once  heparin  Injectable 5000 Unit(s) SubCutaneous every 8 hours  influenza   Vaccine 0.5 milliLiter(s) IntraMuscular once  insulin glargine Injectable (LANTUS) 10 Unit(s) SubCutaneous at bedtime  insulin lispro (HumaLOG) corrective regimen sliding scale   SubCutaneous three times a day before meals  insulin lispro (HumaLOG) corrective regimen sliding scale   SubCutaneous at bedtime  insulin lispro Injectable (HumaLOG) 3 Unit(s) SubCutaneous three times a day before meals  piperacillin/tazobactam IVPB. 3.375 Gram(s) IV Intermittent once  piperacillin/tazobactam IVPB.. 3.375 Gram(s) IV Intermittent every 8 hours  sodium chloride 0.9%. 1000 milliLiter(s) (100 mL/Hr) IV Continuous <Continuous>    MEDICATIONS  (PRN):  acetaminophen   Tablet .. 650 milliGRAM(s) Oral every 6 hours PRN Temp greater or equal to 38C (100.4F), Mild Pain (1 - 3)  HYDROmorphone  Injectable 0.5 milliGRAM(s) IV Push every 6 hours PRN Severe Pain (7 - 10)  oxycodone    5 mG/acetaminophen 325 mG 1 Tablet(s) Oral every 6 hours PRN Moderate Pain (4 - 6)      Vital Signs Last 24 Hrs  T(C): 37.4 (17 Mar 2020 05:35), Max: 37.9 (16 Mar 2020 12:27)  T(F): 99.3 (17 Mar 2020 05:35), Max: 100.3 (16 Mar 2020 12:27)  HR: 107 (17 Mar 2020 11:36) (94 - 120)  BP: 131/75 (17 Mar 2020 11:36) (97/67 - 136/97)  BP(mean): 97 (16 Mar 2020 21:26) (77 - 108)  RR: 18 (17 Mar 2020 05:35) (16 - 20)  SpO2: 93% (17 Mar 2020 11:36) (92% - 100%)    Physical Exam:  Gen: awake, alert oriented NAD  Abd: obese, soft, not tender, not distended, no pulsatile mass  Ext: R foot with erythema to ankle, open wound with exposed tendon, necrotic muscle. no purulence noted, warm to touch. Moving remaining toes  Vasc: b/l Femoral/popliteal pulses palpable, L DP/PT palpable. R DP/PT dopperable    Labs:                          10.2   25.59 )-----------( 317      ( 17 Mar 2020 07:46 )             32.4     03-17    132<L>  |  98  |  14  ----------------------------<  270<H>  3.9   |  19<L>  |  1.04    Ca    7.9<L>      17 Mar 2020 07:46  Phos  2.0     03-17  Mg     2.0     -17    TPro  7.3  /  Alb  1.9<L>  /  TBili  0.7  /  DBili  x   /  AST  39  /  ALT  31  /  AlkPhos  181<H>  0317    PT/INR - ( 16 Mar 2020 13:44 )   PT: 14.7 sec;   INR: 1.29 ratio      Hemoglobin A1C, Whole Blood in AM (20 @ 00:09)    Hemoglobin A1C, Whole Blood: 14.4: Method: Immunoassay       Reference Range                4.0-5.6%       High risk (prediabetic)        5.7-6.4%       Diabetic, diagnostic             >=6.5%       ADA diabetic treatment goal       <7.0%  The Hemoglobin A1c testing is NGSP-certified.Reference ranges are based  upon the 2010 recommendations of  the American Diabetes Association.  Interpretation may vary for children  and adolescents. %           PTT - ( 16 Mar 2020 13:44 )  PTT:28.4 sec  Urinalysis Basic - ( 16 Mar 2020 17:02 )    Color: Yellow / Appearance: Clear / S.020 / pH: x  Gluc: < from: Xray Foot AP + Lateral + Oblique, Right (20 @ 15:11) >  IMPRESSION: Gas forming infection around the right fifth MTP joint.    < end of copied text >  x / Ketone: Moderate  / Bili: Negative / Urobili: Negative   Blood: x / Protein: 30 mg/dL / Nitrite: Negative   Leuk Esterase: Negative / RBC: 0-2 /HPF / WBC 0-2 /HPF   Sq Epi: x / Non Sq Epi: Few /HPF / Bacteria: Few /HPF        Radiological Exams:  < from: Xray Foot AP + Lateral + Oblique, Right (20 @ 15:11) >  IMPRESSION: Gas forming infection around the right fifth MTP joint.    < end of copied text >

## 2020-03-17 NOTE — PROGRESS NOTE ADULT - PROBLEM SELECTOR PLAN 1
Pt p/w fever , wbc 30 k , lactate 2.9 tachycardia 120   - sepsis 2/2 cellulitis of right foot and leg, gas gangrene of right 5toe/foot  - s/p I&D with partial amputation of R fifth digit  - Continue clindamycin 900mg q8 and zosyn   - Lactate now normalized  - f/u BCx  - PT eval

## 2020-03-17 NOTE — PROGRESS NOTE ADULT - PROBLEM SELECTOR PLAN 2
pt p/w R foot cellulitis , with draining wound and discoloration of 5th digit   - Xray R foot : gas forming infection of 5th digit   - S/p OR on 3/16 for I&D with partial amputation of 5th digit  - Repeat xray with postop changes  - Normal ABIs bilaterally, slightly dampened waveforms at level of fifth digit  - MRI with no e/o OM  - ESR 97, CRP 36  - Continue clindamycin 900 mg q8 and zosyn IV   - f/u wound Cx and BCx  - pain control   - Pod consult noted  - ID Dr Mcadams

## 2020-03-17 NOTE — DIETITIAN INITIAL EVALUATION ADULT. - PROBLEM SELECTOR PLAN 3
Pt p/w  , Corrected AG 21.5 , +ve acetone in blood and urine   -s/p 2 l NS and 5 u of r- insulin    BS trending down , AG closed   - will c/w LR at 100cc/hr   - will start on lantus 10 U HS as pt is insulin naive   - Humalog 3 U pre meals and HSS   - NPO for OR , resume diet after OR   - endo consult Dr Wilkerson

## 2020-03-18 LAB
ALBUMIN SERPL ELPH-MCNC: 1.7 G/DL — LOW (ref 3.5–5)
ALP SERPL-CCNC: 196 U/L — HIGH (ref 40–120)
ALT FLD-CCNC: 39 U/L DA — SIGNIFICANT CHANGE UP (ref 10–60)
ANION GAP SERPL CALC-SCNC: 17 MMOL/L — SIGNIFICANT CHANGE UP (ref 5–17)
AST SERPL-CCNC: 45 U/L — HIGH (ref 10–40)
BILIRUB DIRECT SERPL-MCNC: 0.2 MG/DL — SIGNIFICANT CHANGE UP (ref 0–0.2)
BILIRUB INDIRECT FLD-MCNC: 0.4 MG/DL — SIGNIFICANT CHANGE UP (ref 0.2–1)
BILIRUB SERPL-MCNC: 0.6 MG/DL — SIGNIFICANT CHANGE UP (ref 0.2–1.2)
BUN SERPL-MCNC: 14 MG/DL — SIGNIFICANT CHANGE UP (ref 7–18)
CALCIUM SERPL-MCNC: 8.5 MG/DL — SIGNIFICANT CHANGE UP (ref 8.4–10.5)
CHLORIDE SERPL-SCNC: 98 MMOL/L — SIGNIFICANT CHANGE UP (ref 96–108)
CO2 SERPL-SCNC: 18 MMOL/L — LOW (ref 22–31)
CREAT SERPL-MCNC: 1.02 MG/DL — SIGNIFICANT CHANGE UP (ref 0.5–1.3)
GLUCOSE BLDC GLUCOMTR-MCNC: 291 MG/DL — HIGH (ref 70–99)
GLUCOSE BLDC GLUCOMTR-MCNC: 295 MG/DL — HIGH (ref 70–99)
GLUCOSE BLDC GLUCOMTR-MCNC: 333 MG/DL — HIGH (ref 70–99)
GLUCOSE BLDC GLUCOMTR-MCNC: 352 MG/DL — HIGH (ref 70–99)
GLUCOSE SERPL-MCNC: 273 MG/DL — HIGH (ref 70–99)
HCT VFR BLD CALC: 31.2 % — LOW (ref 39–50)
HGB BLD-MCNC: 10.1 G/DL — LOW (ref 13–17)
MCHC RBC-ENTMCNC: 29 PG — SIGNIFICANT CHANGE UP (ref 27–34)
MCHC RBC-ENTMCNC: 32.4 GM/DL — SIGNIFICANT CHANGE UP (ref 32–36)
MCV RBC AUTO: 89.7 FL — SIGNIFICANT CHANGE UP (ref 80–100)
NRBC # BLD: 0 /100 WBCS — SIGNIFICANT CHANGE UP (ref 0–0)
PLATELET # BLD AUTO: 331 K/UL — SIGNIFICANT CHANGE UP (ref 150–400)
POTASSIUM SERPL-MCNC: 3.7 MMOL/L — SIGNIFICANT CHANGE UP (ref 3.5–5.3)
POTASSIUM SERPL-SCNC: 3.7 MMOL/L — SIGNIFICANT CHANGE UP (ref 3.5–5.3)
PROT SERPL-MCNC: 7.4 G/DL — SIGNIFICANT CHANGE UP (ref 6–8.3)
RBC # BLD: 3.48 M/UL — LOW (ref 4.2–5.8)
RBC # FLD: 13.3 % — SIGNIFICANT CHANGE UP (ref 10.3–14.5)
SODIUM SERPL-SCNC: 133 MMOL/L — LOW (ref 135–145)
SURGICAL PATHOLOGY STUDY: SIGNIFICANT CHANGE UP
WBC # BLD: 23.6 K/UL — HIGH (ref 3.8–10.5)
WBC # FLD AUTO: 23.6 K/UL — HIGH (ref 3.8–10.5)

## 2020-03-18 RX ORDER — INSULIN LISPRO 100/ML
8 VIAL (ML) SUBCUTANEOUS
Refills: 0 | Status: DISCONTINUED | OUTPATIENT
Start: 2020-03-18 | End: 2020-03-18

## 2020-03-18 RX ORDER — INSULIN LISPRO 100/ML
VIAL (ML) SUBCUTANEOUS AT BEDTIME
Refills: 0 | Status: DISCONTINUED | OUTPATIENT
Start: 2020-03-18 | End: 2020-03-20

## 2020-03-18 RX ORDER — SODIUM,POTASSIUM PHOSPHATES 278-250MG
1 POWDER IN PACKET (EA) ORAL
Refills: 0 | Status: DISCONTINUED | OUTPATIENT
Start: 2020-03-18 | End: 2020-03-20

## 2020-03-18 RX ORDER — INSULIN LISPRO 100/ML
10 VIAL (ML) SUBCUTANEOUS
Refills: 0 | Status: DISCONTINUED | OUTPATIENT
Start: 2020-03-18 | End: 2020-03-19

## 2020-03-18 RX ORDER — INSULIN GLARGINE 100 [IU]/ML
35 INJECTION, SOLUTION SUBCUTANEOUS AT BEDTIME
Refills: 0 | Status: DISCONTINUED | OUTPATIENT
Start: 2020-03-18 | End: 2020-03-19

## 2020-03-18 RX ORDER — INSULIN LISPRO 100/ML
VIAL (ML) SUBCUTANEOUS
Refills: 0 | Status: DISCONTINUED | OUTPATIENT
Start: 2020-03-18 | End: 2020-03-20

## 2020-03-18 RX ORDER — ERGOCALCIFEROL 1.25 MG/1
50000 CAPSULE ORAL
Refills: 0 | Status: DISCONTINUED | OUTPATIENT
Start: 2020-03-18 | End: 2020-03-20

## 2020-03-18 RX ORDER — INSULIN GLARGINE 100 [IU]/ML
30 INJECTION, SOLUTION SUBCUTANEOUS AT BEDTIME
Refills: 0 | Status: DISCONTINUED | OUTPATIENT
Start: 2020-03-18 | End: 2020-03-18

## 2020-03-18 RX ADMIN — Medication 1 PACKET(S): at 17:07

## 2020-03-18 RX ADMIN — HEPARIN SODIUM 5000 UNIT(S): 5000 INJECTION INTRAVENOUS; SUBCUTANEOUS at 21:55

## 2020-03-18 RX ADMIN — OXYCODONE AND ACETAMINOPHEN 1 TABLET(S): 5; 325 TABLET ORAL at 06:22

## 2020-03-18 RX ADMIN — OXYCODONE AND ACETAMINOPHEN 1 TABLET(S): 5; 325 TABLET ORAL at 20:43

## 2020-03-18 RX ADMIN — Medication 6 UNIT(S): at 08:14

## 2020-03-18 RX ADMIN — HEPARIN SODIUM 5000 UNIT(S): 5000 INJECTION INTRAVENOUS; SUBCUTANEOUS at 13:05

## 2020-03-18 RX ADMIN — ERGOCALCIFEROL 50000 UNIT(S): 1.25 CAPSULE ORAL at 11:46

## 2020-03-18 RX ADMIN — Medication 100 MILLIGRAM(S): at 01:44

## 2020-03-18 RX ADMIN — OXYCODONE AND ACETAMINOPHEN 1 TABLET(S): 5; 325 TABLET ORAL at 21:13

## 2020-03-18 RX ADMIN — INSULIN GLARGINE 35 UNIT(S): 100 INJECTION, SOLUTION SUBCUTANEOUS at 21:55

## 2020-03-18 RX ADMIN — Medication 4: at 21:55

## 2020-03-18 RX ADMIN — OXYCODONE AND ACETAMINOPHEN 1 TABLET(S): 5; 325 TABLET ORAL at 05:52

## 2020-03-18 RX ADMIN — Medication 100 MILLIGRAM(S): at 17:07

## 2020-03-18 RX ADMIN — Medication 100 MILLIGRAM(S): at 08:16

## 2020-03-18 RX ADMIN — Medication 10 UNIT(S): at 17:07

## 2020-03-18 RX ADMIN — PIPERACILLIN AND TAZOBACTAM 25 GRAM(S): 4; .5 INJECTION, POWDER, LYOPHILIZED, FOR SOLUTION INTRAVENOUS at 05:52

## 2020-03-18 RX ADMIN — HEPARIN SODIUM 5000 UNIT(S): 5000 INJECTION INTRAVENOUS; SUBCUTANEOUS at 05:52

## 2020-03-18 RX ADMIN — Medication 3: at 08:15

## 2020-03-18 RX ADMIN — Medication 1 PACKET(S): at 21:56

## 2020-03-18 RX ADMIN — OXYCODONE AND ACETAMINOPHEN 1 TABLET(S): 5; 325 TABLET ORAL at 11:58

## 2020-03-18 RX ADMIN — PIPERACILLIN AND TAZOBACTAM 25 GRAM(S): 4; .5 INJECTION, POWDER, LYOPHILIZED, FOR SOLUTION INTRAVENOUS at 13:05

## 2020-03-18 RX ADMIN — Medication 10: at 11:46

## 2020-03-18 RX ADMIN — Medication 6: at 17:07

## 2020-03-18 RX ADMIN — Medication 6 UNIT(S): at 11:46

## 2020-03-18 RX ADMIN — PIPERACILLIN AND TAZOBACTAM 25 GRAM(S): 4; .5 INJECTION, POWDER, LYOPHILIZED, FOR SOLUTION INTRAVENOUS at 21:00

## 2020-03-18 RX ADMIN — OXYCODONE AND ACETAMINOPHEN 1 TABLET(S): 5; 325 TABLET ORAL at 12:30

## 2020-03-18 NOTE — PROGRESS NOTE ADULT - SUBJECTIVE AND OBJECTIVE BOX
Interval Events:      Allergies    No Known Allergies    Intolerances      Endocrine/Metabolic Medications:  dextrose 50% Injectable 25 Gram(s) IV Push once  insulin glargine Injectable (LANTUS) 30 Unit(s) SubCutaneous at bedtime  insulin lispro (HumaLOG) corrective regimen sliding scale   SubCutaneous three times a day before meals  insulin lispro (HumaLOG) corrective regimen sliding scale   SubCutaneous at bedtime  insulin lispro Injectable (HumaLOG) 8 Unit(s) SubCutaneous three times a day before meals      Vital Signs Last 24 Hrs  T(C): 36.8 (18 Mar 2020 12:36), Max: 37.4 (17 Mar 2020 20:48)  T(F): 98.2 (18 Mar 2020 12:36), Max: 99.3 (17 Mar 2020 20:48)  HR: 93 (18 Mar 2020 12:36) (89 - 106)  BP: 120/80 (18 Mar 2020 12:36) (120/80 - 138/85)  BP(mean): --  RR: 18 (18 Mar 2020 12:36) (17 - 18)  SpO2: 95% (18 Mar 2020 12:36) (95% - 96%)      PHYSICAL EXAM  All physical exam findings normal, except those marked:  General:	Alert, active, cooperative, NAD, well hydrated  .		[] Abnormal:  Neck		Normal: supple, no cervical adenopathy, no palpable thyroid  .		[] Abnormal:  Cardiovascular	Normal: regular rate, normal S1, S2, no murmurs  .		[] Abnormal:  Respiratory	Normal: no chest wall deformity, normal respiratory pattern, CTA B/L  .		[] Abnormal:  Abdominal	Normal: soft, ND, NT, bowel sounds present, no masses, no organomegaly  .		[] Abnormal:  		Normal normal genitalia, testes descended, circumcised/uncircumcised  .		Mer stage:			Breast mer:  .		Menstrual history:  .		[] Abnormal:  Extremities	Normal: FROM x4  .		[] Abnormal:  Skin		Normal: intact and not indurated, no rash, no acanthosis nigricans  .		[] Abnormal:  Neurologic	Normal: grossly intact  .		[] Abnormal:    LABS                        10.1   23.60 )-----------( 331      ( 18 Mar 2020 07:12 )             31.2                               133    |  98     |  14                  Calcium: 8.5   / iCa: x      (03-18 @ 07:12)    ----------------------------<  273       Magnesium: x                                3.7     |  18     |  1.02             Phosphorous: x        TPro  7.4    /  Alb  1.7    /  TBili  0.6    /  DBili  0.2    /  AST  45     /  ALT  39     /  AlkPhos  196    18 Mar 2020 07:12    CAPILLARY BLOOD GLUCOSE      POCT Blood Glucose.: 352 mg/dL (18 Mar 2020 11:31)  POCT Blood Glucose.: 295 mg/dL (18 Mar 2020 07:55)  POCT Blood Glucose.: 292 mg/dL (17 Mar 2020 21:02)  POCT Blood Glucose.: 355 mg/dL (17 Mar 2020 16:32)        Assesment/plan Interval Events:  pt in nad    Allergies    No Known Allergies    Intolerances      Endocrine/Metabolic Medications:  dextrose 50% Injectable 25 Gram(s) IV Push once  insulin glargine Injectable (LANTUS) 30 Unit(s) SubCutaneous at bedtime  insulin lispro (HumaLOG) corrective regimen sliding scale   SubCutaneous three times a day before meals  insulin lispro (HumaLOG) corrective regimen sliding scale   SubCutaneous at bedtime  insulin lispro Injectable (HumaLOG) 8 Unit(s) SubCutaneous three times a day before meals      Vital Signs Last 24 Hrs  T(C): 36.8 (18 Mar 2020 12:36), Max: 37.4 (17 Mar 2020 20:48)  T(F): 98.2 (18 Mar 2020 12:36), Max: 99.3 (17 Mar 2020 20:48)  HR: 93 (18 Mar 2020 12:36) (89 - 106)  BP: 120/80 (18 Mar 2020 12:36) (120/80 - 138/85)  BP(mean): --  RR: 18 (18 Mar 2020 12:36) (17 - 18)  SpO2: 95% (18 Mar 2020 12:36) (95% - 96%)      PHYSICAL EXAM  All physical exam findings normal, except those marked:  General:	Alert, active, cooperative, NAD, well hydrated  .		[] Abnormal:  Neck		Normal: supple, no cervical adenopathy, no palpable thyroid  .		[] Abnormal:  Cardiovascular	Normal: regular rate, normal S1, S2, no murmurs  .		[] Abnormal:  Respiratory	Normal: no chest wall deformity, normal respiratory pattern, CTA B/L  .		[] Abnormal:  Abdominal	Normal: soft, ND, NT, bowel sounds present, no masses, no organomegaly  .		[] Abnormal:  		Normal normal genitalia, testes descended, circumcised/uncircumcised  .		Mer stage:			Breast mer:  .		Menstrual history:  .		[] Abnormal:  Extremities	Normal: FROM x4  .		[] Abnormal:  Skin		Normal: intact and not indurated, no rash, no acanthosis nigricans  .		[] Abnormal:  Neurologic	Normal: grossly intact  .		[] Abnormal:    LABS                        10.1   23.60 )-----------( 331      ( 18 Mar 2020 07:12 )             31.2                               133    |  98     |  14                  Calcium: 8.5   / iCa: x      (03-18 @ 07:12)    ----------------------------<  273       Magnesium: x                                3.7     |  18     |  1.02             Phosphorous: x        TPro  7.4    /  Alb  1.7    /  TBili  0.6    /  DBili  0.2    /  AST  45     /  ALT  39     /  AlkPhos  196    18 Mar 2020 07:12    CAPILLARY BLOOD GLUCOSE      POCT Blood Glucose.: 352 mg/dL (18 Mar 2020 11:31)  POCT Blood Glucose.: 295 mg/dL (18 Mar 2020 07:55)  POCT Blood Glucose.: 292 mg/dL (17 Mar 2020 21:02)  POCT Blood Glucose.: 355 mg/dL (17 Mar 2020 16:32)

## 2020-03-18 NOTE — PROGRESS NOTE ADULT - SUBJECTIVE AND OBJECTIVE BOX
PGY 2 Note discussed with primary attending.    Patient is a 59y old  Male who presents with a chief complaint of Right foot swelling (18 Mar 2020 10:05)      INTERVAL HPI/OVERNIGHT EVENTS: patient assessed bedside, denies fever, chills, diarrhea or cough, reports pain in R foot.    MEDICATIONS  (STANDING):  clindamycin IVPB 900 milliGRAM(s) IV Intermittent every 8 hours  clindamycin IVPB      dextrose 50% Injectable 25 Gram(s) IV Push once  ergocalciferol 81616 Unit(s) Oral <User Schedule>  heparin  Injectable 5000 Unit(s) SubCutaneous every 8 hours  influenza   Vaccine 0.5 milliLiter(s) IntraMuscular once  insulin glargine Injectable (LANTUS) 30 Unit(s) SubCutaneous at bedtime  insulin lispro (HumaLOG) corrective regimen sliding scale   SubCutaneous three times a day before meals  insulin lispro (HumaLOG) corrective regimen sliding scale   SubCutaneous at bedtime  insulin lispro Injectable (HumaLOG) 8 Unit(s) SubCutaneous three times a day before meals  piperacillin/tazobactam IVPB.. 3.375 Gram(s) IV Intermittent every 8 hours  piperacillin/tazobactam IVPB..      potassium phosphate / sodium phosphate powder 1 Packet(s) Oral four times a day with meals  sodium chloride 0.9%. 1000 milliLiter(s) (100 mL/Hr) IV Continuous <Continuous>    MEDICATIONS  (PRN):  acetaminophen   Tablet .. 650 milliGRAM(s) Oral every 6 hours PRN Temp greater or equal to 38C (100.4F), Mild Pain (1 - 3)  HYDROmorphone  Injectable 0.5 milliGRAM(s) IV Push every 6 hours PRN Severe Pain (7 - 10)  oxycodone    5 mG/acetaminophen 325 mG 1 Tablet(s) Oral every 6 hours PRN Moderate Pain (4 - 6)      Allergies    No Known Allergies    Intolerances      REVIEW OF SYSTEMS:  CONSTITUTIONAL: No fever, weight loss, or fatigue  RESPIRATORY: No cough, wheezing, chills or hemoptysis; No shortness of breath  CARDIOVASCULAR: No chest pain, palpitations, dizziness, or leg swelling  GASTROINTESTINAL: No abdominal or epigastric pain. No nausea, vomiting, or hematemesis; No diarrhea or constipation. No melena or hematochezia.  NEUROLOGICAL: No headaches, memory loss, loss of strength, numbness, or tremors  SKIN: wound on right foot    Vital Signs Last 24 Hrs  T(C): 36.8 (18 Mar 2020 12:36), Max: 37.4 (17 Mar 2020 20:48)  T(F): 98.2 (18 Mar 2020 12:36), Max: 99.3 (17 Mar 2020 20:48)  HR: 93 (18 Mar 2020 12:36) (89 - 106)  BP: 120/80 (18 Mar 2020 12:36) (120/80 - 138/85)  BP(mean): --  RR: 18 (18 Mar 2020 12:36) (17 - 18)  SpO2: 95% (18 Mar 2020 12:36) (95% - 96%)    PHYSICAL EXAM:  GENERAL: NAD, well-groomed, well-developed, obese  HEAD:  Atraumatic, Normocephalic  CHEST/LUNG: Clear to auscultation bilaterally; No rales, rhonchi, wheezing, or rubs  HEART: Regular rate and rhythm; No murmurs, rubs, or gallops  ABDOMEN: Soft, Nontender, Nondistended; Bowel sounds present  NERVOUS SYSTEM:  Alert & Oriented X3, Good concentration; Motor Strength 5/5 B/L   EXTREMITIES:  2+ Peripheral Pulses, No clubbing, cyanosis, or edema; right foot s/p partial amputation of 5th ray, dressing c/i               LABS:                        10.1   23.60 )-----------( 331      ( 18 Mar 2020 07:12 )             31.2     03-18    133<L>  |  98  |  14  ----------------------------<  273<H>  3.7   |  18<L>  |  1.02    Ca    8.5      18 Mar 2020 07:12  Phos  2.0     03-17  Mg     2.0     03-17    TPro  7.4  /  Alb  1.7<L>  /  TBili  0.6  /  DBili  0.2  /  AST  45<H>  /  ALT  39  /  AlkPhos  196<H>  03-18    PT/INR - ( 16 Mar 2020 13:44 )   PT: 14.7 sec;   INR: 1.29 ratio         PTT - ( 16 Mar 2020 13:44 )  PTT:28.4 sec  Urinalysis Basic - ( 16 Mar 2020 17:02 )    Color: Yellow / Appearance: Clear / S.020 / pH: x  Gluc: x / Ketone: Moderate  / Bili: Negative / Urobili: Negative   Blood: x / Protein: 30 mg/dL / Nitrite: Negative   Leuk Esterase: Negative / RBC: 0-2 /HPF / WBC 0-2 /HPF   Sq Epi: x / Non Sq Epi: Few /HPF / Bacteria: Few /HPF      CAPILLARY BLOOD GLUCOSE      POCT Blood Glucose.: 352 mg/dL (18 Mar 2020 11:31)  POCT Blood Glucose.: 295 mg/dL (18 Mar 2020 07:55)  POCT Blood Glucose.: 292 mg/dL (17 Mar 2020 21:02)  POCT Blood Glucose.: 355 mg/dL (17 Mar 2020 16:32)    Consultant(s) Notes Reviewed:  [ x ] YES  [ ] NO

## 2020-03-18 NOTE — PROGRESS NOTE ADULT - ASSESSMENT
Right foot edema , erythema and 5th toe discoloration, erythema edema  extending to entire foot . interdigital maceration mainly located at 4th interspace, red streaking at ant aspect of right leg. blistering with serosanguineous fluid at dorsal and plantar foot.     Pt will be admitted to [facility] for R foot infection  and mild DKA

## 2020-03-18 NOTE — PROGRESS NOTE ADULT - SUBJECTIVE AND OBJECTIVE BOX
59y Male is under our care for     REVIEW OF SYSTEMS:  [  ] Not able to illicit  General:	  Chest:	  GI:	  :  Skin:	  Musculoskeletal:	  Neuro:	    MEDS:  clindamycin IVPB 900 milliGRAM(s) IV Intermittent every 8 hours  clindamycin IVPB      piperacillin/tazobactam IVPB.. 3.375 Gram(s) IV Intermittent every 8 hours  piperacillin/tazobactam IVPB..        ALLERGIES: Allergies    No Known Allergies    Intolerances        VITALS:  Vital Signs Last 24 Hrs  T(C): 36.8 (18 Mar 2020 12:36), Max: 37.4 (17 Mar 2020 20:48)  T(F): 98.2 (18 Mar 2020 12:36), Max: 99.3 (17 Mar 2020 20:48)  HR: 93 (18 Mar 2020 12:36) (89 - 106)  BP: 120/80 (18 Mar 2020 12:36) (120/80 - 138/85)  BP(mean): --  RR: 18 (18 Mar 2020 12:36) (17 - 18)  SpO2: 95% (18 Mar 2020 12:36) (95% - 96%)      PHYSICAL EXAM:  HEENT:  Neck:  Respiratory:  Cardiovascular:  Gastrointestinal:  Extremities:  Skin:  Ortho:  Neuro:    LABS/DIAGNOSTIC TESTS:                        10.1   23.60 )-----------( 331      ( 18 Mar 2020 07:12 )             31.2     WBC Count: 23.60 K/uL (03-18 @ 07:12)  WBC Count: 25.59 K/uL (03-17 @ 07:46)  WBC Count: 28.45 K/uL (03-16 @ 17:55)  WBC Count: 29.66 K/uL (03-16 @ 14:55)    03-18    133<L>  |  98  |  14  ----------------------------<  273<H>  3.7   |  18<L>  |  1.02    Ca    8.5      18 Mar 2020 07:12  Phos  2.0     03-17  Mg     2.0     03-17    TPro  7.4  /  Alb  1.7<L>  /  TBili  0.6  /  DBili  0.2  /  AST  45<H>  /  ALT  39  /  AlkPhos  196<H>  03-18      CULTURES:   .Surgical Swab  03-17 @ 10:04   No growth  --  --      .Blood  03-16 @ 22:40   No growth to date.  --  --      .Blood  03-16 @ 22:39   No growth to date.  --  --        RADIOLOGY:  no new studies 59y Male is under our care for gas gangrene and acute osteomyelitis (acute)of right 5th toe / foot and cellulitis of right foot and leg. Patient is doing well but still c/o lingering right foot pain. No fevers and wbc count is trending downward. Going back to OR on Thursday for right foot debridement. Wound culture is with no growth.      REVIEW OF SYSTEMS:  [  ] Not able to illicit  General: no fevers no malaise  Chest: no cough no sob  GI: no nvd  : no urinary sxs   Skin: no rashes  Musculoskeletal: no trauma no LBP  Neuro: no ha's no dizziness     MEDS:  clindamycin IVPB 900 milliGRAM(s) IV Intermittent every 8 hours  piperacillin/tazobactam IVPB.. 3.375 Gram(s) IV Intermittent every 8 hours       ALLERGIES: Allergies    No Known Allergies    Intolerances      VITALS:  Vital Signs Last 24 Hrs  T(C): 36.8 (18 Mar 2020 12:36), Max: 37.4 (17 Mar 2020 20:48)  T(F): 98.2 (18 Mar 2020 12:36), Max: 99.3 (17 Mar 2020 20:48)  HR: 93 (18 Mar 2020 12:36) (89 - 106)  BP: 120/80 (18 Mar 2020 12:36) (120/80 - 138/85)  BP(mean): --  RR: 18 (18 Mar 2020 12:36) (17 - 18)  SpO2: 95% (18 Mar 2020 12:36) (95% - 96%)      PHYSICAL EXAM:  HEENT: n/a  Neck: supple no LN's   Respiratory: lungs clear no rales  Cardiovascular: S1 S2 reg no murmurs  Gastrointestinal: +BS with soft, large abdominal pannus; nontender  Extremities: decreasing RLE edema  Skin: lingering RLE warmth and markedly improved cellulitis, right foot wrapped  Ortho: n/a  Neuro: AAO x 3      LABS/DIAGNOSTIC TESTS:                        10.1   23.60 )-----------( 331      ( 18 Mar 2020 07:12 )             31.2     WBC Count: 23.60 K/uL (03-18 @ 07:12)  WBC Count: 25.59 K/uL (03-17 @ 07:46)  WBC Count: 28.45 K/uL (03-16 @ 17:55)  WBC Count: 29.66 K/uL (03-16 @ 14:55)    03-18    133<L>  |  98  |  14  ----------------------------<  273<H>  3.7   |  18<L>  |  1.02    Ca    8.5      18 Mar 2020 07:12  Phos  2.0     03-17  Mg     2.0     03-17    TPro  7.4  /  Alb  1.7<L>  /  TBili  0.6  /  DBili  0.2  /  AST  45<H>  /  ALT  39  /  AlkPhos  196<H>  03-18      CULTURES:   .Surgical Swab  03-17 @ 10:04   No growth  --  --      .Blood  03-16 @ 22:40   No growth to date.  --  --      .Blood  03-16 @ 22:39   No growth to date.  --  --        RADIOLOGY:  no new studies

## 2020-03-18 NOTE — PROGRESS NOTE ADULT - SUBJECTIVE AND OBJECTIVE BOX
Podiatry interval HPI: Pt was seen by [email or web address] this AM s/p 2 days OR partial 5th ray amp with I&D sec to Nec fasc with soft tissue emphysema Right foot. Pt states he still have some pain. Pt denies F/C/N/V/SOB. WBC trending down. Podiatry monitoring wbc and erythema with streaking anterior right leg. Plan of surgical debridement with application of graft and wound vac placement Friday 3/20.      S : 59y year old Male seen at bedside for Right foot swelling, rednees and blister formation. Pt states that he thinks he stepped on a splinter or some small foreign body about 1 1/2 week ago. He states that at that time he had small amount of discomfort which has been getting worst since Thursday. Pt states he went to urgent care this morning and doctor asked him to go to ED. Pt denies any F/C/N/V/SOB.    PMH: Pt denies any PMH but states that his fingerstick in hospital is high  PSH:     Allergies:No Known Allergies      Labs:                             10.1   23.60 )-----------( 331      ( 18 Mar 2020 07:12 )             31.2     03-18    133<L>  |  98  |  14  ----------------------------<  273<H>  3.7   |  18<L>  |  1.02    Ca    8.5      18 Mar 2020 07:12  Phos  2.0     03-17  Mg     2.0     03-17    TPro  7.4  /  Alb  1.7<L>  /  TBili  0.6  /  DBili  0.2  /  AST  45<H>  /  ALT  39  /  AlkPhos  196<H>  03-18      Vital Signs Last 24 Hrs  T(C): 36.6 (18 Mar 2020 05:25), Max: 37.4 (17 Mar 2020 20:48)  T(F): 97.9 (18 Mar 2020 05:25), Max: 99.3 (17 Mar 2020 20:48)  HR: 89 (18 Mar 2020 05:25) (89 - 107)  BP: 138/85 (18 Mar 2020 05:25) (118/78 - 138/85)  BP(mean): --  RR: 17 (18 Mar 2020 05:25) (17 - 18)  SpO2: 95% (18 Mar 2020 05:25) (92% - 96%)                       O:   General: Pleasant  male NAD & AOX3.      RLE focused    Right foot status post surgery day two , Rt foot have erythema and edema better than yesterday , necrotic patch about 4x3cm at right dorsal foot, surgical flap close to base of 5th metatarsal exhibit some microvascular sclerosis( questionable viability of flap). no pus was drained today, exposed tendon at dorsal foot. exposed base of 5th metatrsal.  Vascular: Dorsalis Pedis and Posterior Tibial pulses 0/4. audible via doppler right foot.  Capillary re-fill time less then 3 seconds digits 1-3, less than  sec  4th toe.  Neuro: Protective sensation intact to the level of the digits bilateral.      A: s/p OR I&D and partial 5th ray resection Right  foot Necrotizing fascitis( 3/17)      P:   Chart reviewed and Patient evaluated  Discussed diagnosis and treatment with patient  bed side I&d right foot  in ED with drainage of 10 cc of pus 3/17  OR Incision and drainage and partial 5th ray resection right foot 3/17  Podiatry monitoring wbc trend and erythema and leg streaking.  Possible second OR debridement of non viable tissue with graft placement and application of wound vac 3/20  please provide medical optimization.  Pt to NPO thursday midnight.  Vasc consult appreciated >  Rec ID consult  culture pending  applied  betadine DSD with ACE.  Post op xray and MRI reviewed  Continue with IV antibiotics   JESUS was reviewed  Weight bearing as tolerated  Discussed importance of daily foot examinations and proper shoe gear and to importance of lower Fasting Blood Glucose levels.   Podiatry will follow while in house.  Discussed with Attending Dr Eng and seen with Dr Rg.

## 2020-03-18 NOTE — PROGRESS NOTE ADULT - PROBLEM SELECTOR PLAN 1
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

## 2020-03-18 NOTE — PROGRESS NOTE ADULT - PROBLEM SELECTOR PLAN 2
pt p/w R foot cellulitis , with draining wound and discoloration of 5th digit   -Xray R foot : gas forming infection of 5th digit   -S/p OR on 3/16 for I&D with partial amputation of 5th digit  - Repeat xray with postop changes  - Normal ABIs bilaterally, slightly dampened waveforms at level of fifth digit  - MRI with no e/o OM  - ESR 97, CRP 36  - Continue clindamycin 900 mg q8 and zosyn IV   - f/u wound Cx and BCx  - pain control   - Pod consult noted  - ID Dr Mcadams on board

## 2020-03-18 NOTE — PROGRESS NOTE ADULT - ASSESSMENT
Assessment and Recommendation:   · Assessment	  59 y M from home,walks independently with no Significant PMHx and PSHx of Right achilles tendon repair presented to ED from Urgent care for worsening Right foot swelling, discoloration  and pain since 4 days. Found to have un controlled DM. Denies previuos hx of dm. pt c/o nocturia , increased thirst since couple of months now.        Problem/Recommendation - 1:  Problem: Uncontrolled diabetes mellitus. Recommendation: new onset with severe hyperglycemia  change lantus to 35 units  add huamlog 10 ac tid  Moderate Sliding scale   start metformin 500 BID as outpatient upon discharge   fsg ac and hs  nutrition eval  dm teaching   d/w hs.     Problem/Recommendation - 2:  ·  Problem: Sepsis.  Recommendation: due foot infection  s/p debridement  cont iv abx  f/u id and podiatry recs.

## 2020-03-18 NOTE — PROGRESS NOTE ADULT - ASSESSMENT
1.	Gas Gangrene and acute osteo of right 5th toe / foot - s/p amputation  2.	Cellulitis of right foot and leg  3.	Leukocytosis - decreasing  ·	cont Clindamycin 900mgs iv q8hrs  D3  ·	cont zosyn 3.375gms iv q8hrs  D3  ·	going for right foot debridement of Thurs

## 2020-03-18 NOTE — PROGRESS NOTE ADULT - PROBLEM SELECTOR PLAN 3
Pt p/w  , Corrected AG 21.5 , +ve acetone in blood and urine; a1c noted 14.4  -fs now in range of 270 to 350, AG 15 (corrected to 20)  -increase dose of lantus to 30, humalog to 8u premeal  -will switch to moderate dose hss  -endo Dr Wilkerson Pt p/w  , Corrected AG 21.5 , +ve acetone in blood and urine; a1c noted 14.4  -fs now in range of 270 to 350, AG 15 (corrected to 20)  -increase dose of lantus to 35, humalog to 10u premeal  -will switch to moderate dose hss  -endo Dr Wilkerson

## 2020-03-19 ENCOUNTER — TRANSCRIPTION ENCOUNTER (OUTPATIENT)
Age: 60
End: 2020-03-19

## 2020-03-19 LAB
ANION GAP SERPL CALC-SCNC: 13 MMOL/L — SIGNIFICANT CHANGE UP (ref 5–17)
BUN SERPL-MCNC: 13 MG/DL — SIGNIFICANT CHANGE UP (ref 7–18)
CALCIUM SERPL-MCNC: 8.6 MG/DL — SIGNIFICANT CHANGE UP (ref 8.4–10.5)
CHLORIDE SERPL-SCNC: 99 MMOL/L — SIGNIFICANT CHANGE UP (ref 96–108)
CO2 SERPL-SCNC: 21 MMOL/L — LOW (ref 22–31)
CREAT SERPL-MCNC: 1.06 MG/DL — SIGNIFICANT CHANGE UP (ref 0.5–1.3)
GLUCOSE BLDC GLUCOMTR-MCNC: 215 MG/DL — HIGH (ref 70–99)
GLUCOSE BLDC GLUCOMTR-MCNC: 246 MG/DL — HIGH (ref 70–99)
GLUCOSE BLDC GLUCOMTR-MCNC: 262 MG/DL — HIGH (ref 70–99)
GLUCOSE BLDC GLUCOMTR-MCNC: 294 MG/DL — HIGH (ref 70–99)
GLUCOSE BLDC GLUCOMTR-MCNC: 339 MG/DL — HIGH (ref 70–99)
GLUCOSE SERPL-MCNC: 261 MG/DL — HIGH (ref 70–99)
HCT VFR BLD CALC: 34.1 % — LOW (ref 39–50)
HGB BLD-MCNC: 10.7 G/DL — LOW (ref 13–17)
LACTATE SERPL-SCNC: 1.2 MMOL/L — SIGNIFICANT CHANGE UP (ref 0.7–2)
MCHC RBC-ENTMCNC: 28.5 PG — SIGNIFICANT CHANGE UP (ref 27–34)
MCHC RBC-ENTMCNC: 31.4 GM/DL — LOW (ref 32–36)
MCV RBC AUTO: 90.7 FL — SIGNIFICANT CHANGE UP (ref 80–100)
NRBC # BLD: 0 /100 WBCS — SIGNIFICANT CHANGE UP (ref 0–0)
PLATELET # BLD AUTO: 375 K/UL — SIGNIFICANT CHANGE UP (ref 150–400)
POTASSIUM SERPL-MCNC: 3.8 MMOL/L — SIGNIFICANT CHANGE UP (ref 3.5–5.3)
POTASSIUM SERPL-SCNC: 3.8 MMOL/L — SIGNIFICANT CHANGE UP (ref 3.5–5.3)
RBC # BLD: 3.76 M/UL — LOW (ref 4.2–5.8)
RBC # FLD: 13.5 % — SIGNIFICANT CHANGE UP (ref 10.3–14.5)
SODIUM SERPL-SCNC: 133 MMOL/L — LOW (ref 135–145)
WBC # BLD: 19.21 K/UL — HIGH (ref 3.8–10.5)
WBC # FLD AUTO: 19.21 K/UL — HIGH (ref 3.8–10.5)

## 2020-03-19 RX ORDER — INSULIN GLARGINE 100 [IU]/ML
40 INJECTION, SOLUTION SUBCUTANEOUS AT BEDTIME
Refills: 0 | Status: DISCONTINUED | OUTPATIENT
Start: 2020-03-19 | End: 2020-03-20

## 2020-03-19 RX ORDER — INSULIN LISPRO 100/ML
15 VIAL (ML) SUBCUTANEOUS
Refills: 0 | Status: DISCONTINUED | OUTPATIENT
Start: 2020-03-19 | End: 2020-03-20

## 2020-03-19 RX ORDER — LACTOBACILLUS ACIDOPHILUS 100MM CELL
1 CAPSULE ORAL
Refills: 0 | Status: DISCONTINUED | OUTPATIENT
Start: 2020-03-19 | End: 2020-03-20

## 2020-03-19 RX ADMIN — OXYCODONE AND ACETAMINOPHEN 1 TABLET(S): 5; 325 TABLET ORAL at 18:56

## 2020-03-19 RX ADMIN — PIPERACILLIN AND TAZOBACTAM 25 GRAM(S): 4; .5 INJECTION, POWDER, LYOPHILIZED, FOR SOLUTION INTRAVENOUS at 14:42

## 2020-03-19 RX ADMIN — Medication 100 MILLIGRAM(S): at 18:03

## 2020-03-19 RX ADMIN — Medication 10 UNIT(S): at 08:09

## 2020-03-19 RX ADMIN — Medication 10 UNIT(S): at 12:03

## 2020-03-19 RX ADMIN — Medication 1 PACKET(S): at 21:41

## 2020-03-19 RX ADMIN — Medication 100 MILLIGRAM(S): at 01:43

## 2020-03-19 RX ADMIN — Medication 1 TABLET(S): at 22:33

## 2020-03-19 RX ADMIN — Medication 4: at 16:34

## 2020-03-19 RX ADMIN — Medication 15 UNIT(S): at 16:34

## 2020-03-19 RX ADMIN — OXYCODONE AND ACETAMINOPHEN 1 TABLET(S): 5; 325 TABLET ORAL at 05:57

## 2020-03-19 RX ADMIN — Medication 1 PACKET(S): at 08:08

## 2020-03-19 RX ADMIN — INSULIN GLARGINE 40 UNIT(S): 100 INJECTION, SOLUTION SUBCUTANEOUS at 21:41

## 2020-03-19 RX ADMIN — Medication 1 PACKET(S): at 18:03

## 2020-03-19 RX ADMIN — HEPARIN SODIUM 5000 UNIT(S): 5000 INJECTION INTRAVENOUS; SUBCUTANEOUS at 14:42

## 2020-03-19 RX ADMIN — OXYCODONE AND ACETAMINOPHEN 1 TABLET(S): 5; 325 TABLET ORAL at 12:40

## 2020-03-19 RX ADMIN — OXYCODONE AND ACETAMINOPHEN 1 TABLET(S): 5; 325 TABLET ORAL at 05:27

## 2020-03-19 RX ADMIN — OXYCODONE AND ACETAMINOPHEN 1 TABLET(S): 5; 325 TABLET ORAL at 12:02

## 2020-03-19 RX ADMIN — PIPERACILLIN AND TAZOBACTAM 25 GRAM(S): 4; .5 INJECTION, POWDER, LYOPHILIZED, FOR SOLUTION INTRAVENOUS at 05:26

## 2020-03-19 RX ADMIN — Medication 8: at 12:03

## 2020-03-19 RX ADMIN — PIPERACILLIN AND TAZOBACTAM 25 GRAM(S): 4; .5 INJECTION, POWDER, LYOPHILIZED, FOR SOLUTION INTRAVENOUS at 21:41

## 2020-03-19 RX ADMIN — Medication 100 MILLIGRAM(S): at 08:09

## 2020-03-19 RX ADMIN — HYDROMORPHONE HYDROCHLORIDE 0.5 MILLIGRAM(S): 2 INJECTION INTRAMUSCULAR; INTRAVENOUS; SUBCUTANEOUS at 09:27

## 2020-03-19 RX ADMIN — Medication 1 PACKET(S): at 12:02

## 2020-03-19 RX ADMIN — HYDROMORPHONE HYDROCHLORIDE 0.5 MILLIGRAM(S): 2 INJECTION INTRAMUSCULAR; INTRAVENOUS; SUBCUTANEOUS at 10:00

## 2020-03-19 RX ADMIN — Medication 6: at 08:09

## 2020-03-19 RX ADMIN — HEPARIN SODIUM 5000 UNIT(S): 5000 INJECTION INTRAVENOUS; SUBCUTANEOUS at 05:25

## 2020-03-19 RX ADMIN — OXYCODONE AND ACETAMINOPHEN 1 TABLET(S): 5; 325 TABLET ORAL at 18:03

## 2020-03-19 RX ADMIN — HEPARIN SODIUM 5000 UNIT(S): 5000 INJECTION INTRAVENOUS; SUBCUTANEOUS at 21:40

## 2020-03-19 NOTE — PROGRESS NOTE ADULT - SUBJECTIVE AND OBJECTIVE BOX
Podiatry interval HPI: Pt was seen by [email or web address] this AM s/p 3 days OR partial 5th ray amp with I&D sec to Nec fasc with soft tissue emphysema Right foot. Pt denies F/C/N/V/SOB. WBC trending down. Podiatry monitoring wbc and erythema with streaking anterior right leg. Plan of surgical debridement with application of graft and wound vac placement Friday 3/20. Please provide medical optimization There is a fluctuation and discoloration  3rd interspace.       S : 59y year old Male seen at bedside for Right foot swelling, rednees and blister formation. Pt states that he thinks he stepped on a splinter or some small foreign body about 1 1/2 week ago. He states that at that time he had small amount of discomfort which has been getting worst since Thursday. Pt states he went to urgent care this morning and doctor asked him to go to ED. Pt denies any F/C/N/V/SOB.    PMH: Pt denies any PMH but states that his fingerstick in hospital is high  PSH:     Allergies:No Known Allergies      Labs:                               10.7   19.21 )-----------( 375      ( 19 Mar 2020 07:06 )             34.1   03-19    133<L>  |  99  |  13  ----------------------------<  261<H>  3.8   |  21<L>  |  1.06    Ca    8.6      19 Mar 2020 07:06    TPro  7.4  /  Alb  1.7<L>  /  TBili  0.6  /  DBili  0.2  /  AST  45<H>  /  ALT  39  /  AlkPhos  196<H>  03-18    Vital Signs Last 24 Hrs  T(C): 36.2 (19 Mar 2020 05:25), Max: 37 (18 Mar 2020 20:35)  T(F): 97.1 (19 Mar 2020 05:25), Max: 98.6 (18 Mar 2020 20:35)  HR: 93 (19 Mar 2020 05:25) (93 - 100)  BP: 157/88 (19 Mar 2020 05:25) (120/80 - 157/88)  BP(mean): --  RR: 17 (19 Mar 2020 05:25) (17 - 18)  SpO2: 94% (19 Mar 2020 05:25) (94% - 97%)                    O:   General: Pleasant  male NAD & AOX3.      RLE focused    Right foot status post surgery day three , Rt foot  erythema and edema improved  , necrotic patch about 4x3cm at right dorsal foot, surgical flap close to base of 5th metatarsal exhibit some microvascular sclerosis( questionable viability of flap). no pus was drained today, exposed tendon at dorsal foot. exposed base of 5th metatrsal. third interspace discoloration and fluctuation noted.  Vascular: Dorsalis Pedis and Posterior Tibial pulses 0/4. audible via doppler right foot.  Capillary re-fill time less then 3 seconds digits 1-3, less than  sec  4th toe.  Neuro: Protective sensation intact to the level of the digits bilateral.      A: s/p OR I&D and partial 5th ray resection Right  foot Necrotizing fascitis( 3/17)      P:   Chart reviewed and Patient evaluated  Discussed diagnosis and treatment with patient  bed side I&d right foot  in ED with drainage of 10 cc of pus 3/17  OR Incision and drainage and partial 5th ray resection right foot 3/17  Podiatry monitoring wbc trend and erythema and leg streaking.  second OR debridement of non viable tissue with graft placement and application of wound vac 3/20  please provide medical optimization.  Pt to NPO thursday midnight.  Vasc consult appreciated >  Appreciate ID consult and Endo consult  culture pending  applied  betadine DSD with ACE.  Post op xray and MRI reviewed  Continue with IV antibiotics   JESUS was reviewed  Weight bearing as tolerated  Discussed importance of daily foot examinations and proper shoe gear and to importance of lower Fasting Blood Glucose levels.   Podiatry will follow while in house.  Discussed with Attending Dr Eng

## 2020-03-19 NOTE — PROGRESS NOTE ADULT - ASSESSMENT
1.	Gas Gangrene and acute osteo of right 5th toe/ foot - s/p amputation  2.	Cellulitis of right foot/ leg  3.	Leukocytosis - trending down  ·	cont Clindamycin 900mgs iv q8hrs  D4  ·	cont zosyn 3.375gms iv q8hrs  D4  ·	going for right foot debridement tomorrow

## 2020-03-19 NOTE — PROGRESS NOTE ADULT - SUBJECTIVE AND OBJECTIVE BOX
PGY 2 Note discussed with primary attending.    Patient is a 59y old  Male who presents with a chief complaint of Right foot swelling (19 Mar 2020 08:29)      INTERVAL HPI/OVERNIGHT EVENTS: patient assessed bedside, reports pain in R foot, denies fever, chills, cough or diarrhea. Podiatry noted reviewed, plan for OR on 3/20    MEDICATIONS  (STANDING):  clindamycin IVPB 900 milliGRAM(s) IV Intermittent every 8 hours  clindamycin IVPB      dextrose 50% Injectable 25 Gram(s) IV Push once  ergocalciferol 35360 Unit(s) Oral <User Schedule>  heparin  Injectable 5000 Unit(s) SubCutaneous every 8 hours  influenza   Vaccine 0.5 milliLiter(s) IntraMuscular once  insulin glargine Injectable (LANTUS) 35 Unit(s) SubCutaneous at bedtime  insulin lispro (HumaLOG) corrective regimen sliding scale   SubCutaneous three times a day before meals  insulin lispro (HumaLOG) corrective regimen sliding scale   SubCutaneous at bedtime  insulin lispro Injectable (HumaLOG) 10 Unit(s) SubCutaneous three times a day before meals  piperacillin/tazobactam IVPB.. 3.375 Gram(s) IV Intermittent every 8 hours  piperacillin/tazobactam IVPB..      potassium phosphate / sodium phosphate powder 1 Packet(s) Oral four times a day with meals  sodium chloride 0.9%. 1000 milliLiter(s) (100 mL/Hr) IV Continuous <Continuous>    MEDICATIONS  (PRN):  acetaminophen   Tablet .. 650 milliGRAM(s) Oral every 6 hours PRN Temp greater or equal to 38C (100.4F), Mild Pain (1 - 3)  HYDROmorphone  Injectable 0.5 milliGRAM(s) IV Push every 6 hours PRN Severe Pain (7 - 10)  oxycodone    5 mG/acetaminophen 325 mG 1 Tablet(s) Oral every 6 hours PRN Moderate Pain (4 - 6)      Allergies    No Known Allergies    Intolerances    REVIEW OF SYSTEMS:  CONSTITUTIONAL: No fever, weight loss, or fatigue  RESPIRATORY: No cough, wheezing, chills or hemoptysis; No shortness of breath  CARDIOVASCULAR: No chest pain, palpitations, dizziness, or leg swelling  GASTROINTESTINAL: No abdominal or epigastric pain. No nausea, vomiting, or hematemesis; No diarrhea or constipation. No melena or hematochezia.  NEUROLOGICAL: No headaches, memory loss, loss of strength, numbness, or tremors  SKIN: wound on right foot      Vital Signs Last 24 Hrs  T(C): 36.2 (19 Mar 2020 05:25), Max: 37 (18 Mar 2020 20:35)  T(F): 97.1 (19 Mar 2020 05:25), Max: 98.6 (18 Mar 2020 20:35)  HR: 93 (19 Mar 2020 05:25) (93 - 100)  BP: 157/88 (19 Mar 2020 05:25) (120/80 - 157/88)  BP(mean): --  RR: 17 (19 Mar 2020 05:25) (17 - 18)  SpO2: 94% (19 Mar 2020 05:25) (94% - 97%)    PHYSICAL EXAM:  GENERAL: NAD, well-groomed, well-developed, obese  HEAD:  Atraumatic, Normocephalic  CHEST/LUNG: Clear to auscultation bilaterally; No rales, rhonchi, wheezing, or rubs  HEART: Regular rate and rhythm; No murmurs, rubs, or gallops  ABDOMEN: Soft, Nontender, Nondistended; Bowel sounds present  NERVOUS SYSTEM:  Alert & Oriented X3, Good concentration; Motor Strength 5/5 B/L   EXTREMITIES:  2+ Peripheral Pulses, No clubbing, cyanosis, or edema; right foot s/p partial amputation of 5th ray, dressing c/i    LABS:                        10.7   19.21 )-----------( 375      ( 19 Mar 2020 07:06 )             34.1     03-19    133<L>  |  99  |  13  ----------------------------<  261<H>  3.8   |  21<L>  |  1.06    Ca    8.6      19 Mar 2020 07:06    TPro  7.4  /  Alb  1.7<L>  /  TBili  0.6  /  DBili  0.2  /  AST  45<H>  /  ALT  39  /  AlkPhos  196<H>  03-18        CAPILLARY BLOOD GLUCOSE      POCT Blood Glucose.: 339 mg/dL (19 Mar 2020 11:18)  POCT Blood Glucose.: 294 mg/dL (19 Mar 2020 08:01)  POCT Blood Glucose.: 262 mg/dL (19 Mar 2020 00:22)  POCT Blood Glucose.: 333 mg/dL (18 Mar 2020 21:26)  POCT Blood Glucose.: 291 mg/dL (18 Mar 2020 16:31)      Consultant(s) Notes Reviewed:  [ x ] YES  [ ] NO

## 2020-03-19 NOTE — PROGRESS NOTE ADULT - PROBLEM SELECTOR PLAN 3
Pt p/w  , Corrected AG 21.5 , +ve acetone in blood and urine; a1c noted 14.4  -fs now in range of 270 to 350, AG improving  -increase dose of lantus to 40u, humalog to 15u premeal  -continue moderate dose hss  -endo Dr Wilkerson recommendations appreciated, will add metformin 500mg bid on dc

## 2020-03-19 NOTE — PROGRESS NOTE ADULT - PROBLEM/PLAN-6
Onetha Spruce note stated will do another round of clomid and recheck progesterone level on day 21- can you sign off on rx - DISPLAY PLAN FREE TEXT

## 2020-03-19 NOTE — PROGRESS NOTE ADULT - PROBLEM SELECTOR PLAN 1
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  -s/p bedside I&d right foot  in ED with drainage of 10 cc of pus 3/17  -s/p OR Incision and drainage and partial 5th ray resection right foot 3/17  -pathology noted with necrotic area with underlying bone; nonviable margins  -plan for second OR debridement of non viable tissue with graft placement and application of wound vac 3/20  -continue clindamycin 900mg q8 and zosyn   -BCx (NGTD), wound cultures negative  -ID Dr Mcadams, podiatry notes appreciated 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  -s/p bedside I&d right foot  in ED with drainage of 10 cc of pus 3/17  -s/p OR Incision and drainage and partial 5th ray resection right foot 3/17  -pathology noted with necrotic area with underlying bone; nonviable margins  -plan for second OR debridement of non viable tissue with graft placement and application of wound vac 3/20  -continue clindamycin 900mg q8 and zosyn   -BCx (NGTD), wound cultures negative  -ID Dr Mcadams, podiatry notes appreciated  ***patient RCRI class II, with 6% 30-day risk of death, MI or cardiac arrest, LARSON scale with 0.1% risk of MI or cardiac arrest, patient is at moderate risk for low to moderate risk procedure 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  -s/p bedside I&d right foot  in ED with drainage of 10 cc of pus 3/17  -s/p OR Incision and drainage and partial 5th ray resection right foot 3/17  -pathology noted with necrotic area with underlying bone; nonviable margins  -plan for second OR debridement of non viable tissue with graft placement and application of wound vac 3/20  -continue clindamycin 900mg q8 and zosyn   -BCx (NGTD), wound cultures negative  -ID Dr Mcadams, podiatry notes appreciated  ***patient RCRI class I,  with 6% 30-day risk of death, MI or cardiac arrest, LARSON scale with 0.1% risk of MI or cardiac arrest, patient is at moderate risk for low to moderate risk procedure

## 2020-03-19 NOTE — PROGRESS NOTE ADULT - PROBLEM SELECTOR PLAN 2
Pt p/w R foot cellulitis , with draining wound and discoloration of 5th digit   -Xray R foot : gas forming infection of 5th digit   -S/p bedside I&D as well as OR on 3/17 for I&D with partial amputation of 5th digit  - Repeat xray with postop changes  - Normal ABIs bilaterally, slightly dampened waveforms at level of fifth digit  - MRI with no e/o OM  - ESR 97, CRP 36  - plan as above

## 2020-03-19 NOTE — PROGRESS NOTE ADULT - ASSESSMENT
Right foot edema , erythema and 5th toe discoloration, erythema edema  extending to entire foot . interdigital maceration mainly located at 4th interspace, red streaking at ant aspect of right leg. blistering with serosanguineous fluid at dorsal and plantar foot.     Pt admitted to medicine for R foot infection  and mild DKA

## 2020-03-20 ENCOUNTER — RESULT REVIEW (OUTPATIENT)
Age: 60
End: 2020-03-20

## 2020-03-20 LAB
ANION GAP SERPL CALC-SCNC: 9 MMOL/L — SIGNIFICANT CHANGE UP (ref 5–17)
BUN SERPL-MCNC: 10 MG/DL — SIGNIFICANT CHANGE UP (ref 7–18)
CALCIUM SERPL-MCNC: 8.6 MG/DL — SIGNIFICANT CHANGE UP (ref 8.4–10.5)
CHLORIDE SERPL-SCNC: 100 MMOL/L — SIGNIFICANT CHANGE UP (ref 96–108)
CO2 SERPL-SCNC: 25 MMOL/L — SIGNIFICANT CHANGE UP (ref 22–31)
CREAT SERPL-MCNC: 0.91 MG/DL — SIGNIFICANT CHANGE UP (ref 0.5–1.3)
CULTURE RESULTS: SIGNIFICANT CHANGE UP
GLUCOSE BLDC GLUCOMTR-MCNC: 199 MG/DL — HIGH (ref 70–99)
GLUCOSE BLDC GLUCOMTR-MCNC: 212 MG/DL — HIGH (ref 70–99)
GLUCOSE BLDC GLUCOMTR-MCNC: 219 MG/DL — HIGH (ref 70–99)
GLUCOSE BLDC GLUCOMTR-MCNC: 258 MG/DL — HIGH (ref 70–99)
GLUCOSE BLDC GLUCOMTR-MCNC: 313 MG/DL — HIGH (ref 70–99)
GLUCOSE SERPL-MCNC: 200 MG/DL — HIGH (ref 70–99)
HCT VFR BLD CALC: 33.4 % — LOW (ref 39–50)
HGB BLD-MCNC: 10.7 G/DL — LOW (ref 13–17)
MCHC RBC-ENTMCNC: 28.7 PG — SIGNIFICANT CHANGE UP (ref 27–34)
MCHC RBC-ENTMCNC: 32 GM/DL — SIGNIFICANT CHANGE UP (ref 32–36)
MCV RBC AUTO: 89.5 FL — SIGNIFICANT CHANGE UP (ref 80–100)
NRBC # BLD: 0 /100 WBCS — SIGNIFICANT CHANGE UP (ref 0–0)
PLATELET # BLD AUTO: 376 K/UL — SIGNIFICANT CHANGE UP (ref 150–400)
POTASSIUM SERPL-MCNC: 3.4 MMOL/L — LOW (ref 3.5–5.3)
POTASSIUM SERPL-SCNC: 3.4 MMOL/L — LOW (ref 3.5–5.3)
RBC # BLD: 3.73 M/UL — LOW (ref 4.2–5.8)
RBC # FLD: 13.6 % — SIGNIFICANT CHANGE UP (ref 10.3–14.5)
SODIUM SERPL-SCNC: 134 MMOL/L — LOW (ref 135–145)
SPECIMEN SOURCE: SIGNIFICANT CHANGE UP
WBC # BLD: 16.35 K/UL — HIGH (ref 3.8–10.5)
WBC # FLD AUTO: 16.35 K/UL — HIGH (ref 3.8–10.5)

## 2020-03-20 PROCEDURE — 88304 TISSUE EXAM BY PATHOLOGIST: CPT | Mod: 26

## 2020-03-20 RX ORDER — CHLORHEXIDINE GLUCONATE 213 G/1000ML
1 SOLUTION TOPICAL EVERY 12 HOURS
Refills: 0 | Status: DISCONTINUED | OUTPATIENT
Start: 2020-03-20 | End: 2020-03-20

## 2020-03-20 RX ORDER — POTASSIUM CHLORIDE 20 MEQ
40 PACKET (EA) ORAL ONCE
Refills: 0 | Status: COMPLETED | OUTPATIENT
Start: 2020-03-20 | End: 2020-03-20

## 2020-03-20 RX ORDER — INSULIN LISPRO 100/ML
VIAL (ML) SUBCUTANEOUS
Refills: 0 | Status: DISCONTINUED | OUTPATIENT
Start: 2020-03-20 | End: 2020-03-23

## 2020-03-20 RX ORDER — HYDROMORPHONE HYDROCHLORIDE 2 MG/ML
0.5 INJECTION INTRAMUSCULAR; INTRAVENOUS; SUBCUTANEOUS
Refills: 0 | Status: ON HOLD | OUTPATIENT
Start: 2020-03-20

## 2020-03-20 RX ORDER — INSULIN LISPRO 100/ML
VIAL (ML) SUBCUTANEOUS AT BEDTIME
Refills: 0 | Status: DISCONTINUED | OUTPATIENT
Start: 2020-03-20 | End: 2020-03-23

## 2020-03-20 RX ORDER — ONDANSETRON 8 MG/1
4 TABLET, FILM COATED ORAL ONCE
Refills: 0 | Status: DISCONTINUED | OUTPATIENT
Start: 2020-03-20 | End: 2020-03-20

## 2020-03-20 RX ORDER — INSULIN GLARGINE 100 [IU]/ML
40 INJECTION, SOLUTION SUBCUTANEOUS AT BEDTIME
Refills: 0 | Status: DISCONTINUED | OUTPATIENT
Start: 2020-03-20 | End: 2020-03-20

## 2020-03-20 RX ORDER — LACTOBACILLUS ACIDOPHILUS 100MM CELL
1 CAPSULE ORAL
Refills: 0 | Status: DISCONTINUED | OUTPATIENT
Start: 2020-03-20 | End: 2020-03-23

## 2020-03-20 RX ORDER — OXYCODONE AND ACETAMINOPHEN 5; 325 MG/1; MG/1
1 TABLET ORAL EVERY 6 HOURS
Refills: 0 | Status: DISCONTINUED | OUTPATIENT
Start: 2020-03-20 | End: 2020-03-23

## 2020-03-20 RX ORDER — PIPERACILLIN AND TAZOBACTAM 4; .5 G/20ML; G/20ML
3.38 INJECTION, POWDER, LYOPHILIZED, FOR SOLUTION INTRAVENOUS EVERY 8 HOURS
Refills: 0 | Status: DISCONTINUED | OUTPATIENT
Start: 2020-03-20 | End: 2020-03-23

## 2020-03-20 RX ORDER — SODIUM CHLORIDE 9 MG/ML
1000 INJECTION INTRAMUSCULAR; INTRAVENOUS; SUBCUTANEOUS
Refills: 0 | Status: DISCONTINUED | OUTPATIENT
Start: 2020-03-20 | End: 2020-03-20

## 2020-03-20 RX ORDER — ERGOCALCIFEROL 1.25 MG/1
50000 CAPSULE ORAL
Refills: 0 | Status: DISCONTINUED | OUTPATIENT
Start: 2020-03-20 | End: 2020-03-23

## 2020-03-20 RX ORDER — HYDROMORPHONE HYDROCHLORIDE 2 MG/ML
1 INJECTION INTRAMUSCULAR; INTRAVENOUS; SUBCUTANEOUS
Refills: 0 | Status: ON HOLD | OUTPATIENT
Start: 2020-03-20

## 2020-03-20 RX ORDER — FENTANYL CITRATE 50 UG/ML
25 INJECTION INTRAVENOUS
Refills: 0 | Status: DISCONTINUED | OUTPATIENT
Start: 2020-03-20 | End: 2020-03-20

## 2020-03-20 RX ORDER — SODIUM CHLORIDE 9 MG/ML
1000 INJECTION, SOLUTION INTRAVENOUS
Refills: 0 | Status: ON HOLD | OUTPATIENT
Start: 2020-03-20

## 2020-03-20 RX ORDER — INSULIN LISPRO 100/ML
15 VIAL (ML) SUBCUTANEOUS
Refills: 0 | Status: DISCONTINUED | OUTPATIENT
Start: 2020-03-20 | End: 2020-03-23

## 2020-03-20 RX ORDER — ONDANSETRON 8 MG/1
4 TABLET, FILM COATED ORAL ONCE
Refills: 0 | Status: ON HOLD | OUTPATIENT
Start: 2020-03-20

## 2020-03-20 RX ORDER — SODIUM CHLORIDE 9 MG/ML
1000 INJECTION, SOLUTION INTRAVENOUS
Refills: 0 | Status: DISCONTINUED | OUTPATIENT
Start: 2020-03-20 | End: 2020-03-20

## 2020-03-20 RX ORDER — INSULIN GLARGINE 100 [IU]/ML
46 INJECTION, SOLUTION SUBCUTANEOUS AT BEDTIME
Refills: 0 | Status: DISCONTINUED | OUTPATIENT
Start: 2020-03-20 | End: 2020-03-23

## 2020-03-20 RX ORDER — HYDROMORPHONE HYDROCHLORIDE 2 MG/ML
0.5 INJECTION INTRAMUSCULAR; INTRAVENOUS; SUBCUTANEOUS
Refills: 0 | Status: DISCONTINUED | OUTPATIENT
Start: 2020-03-20 | End: 2020-03-20

## 2020-03-20 RX ORDER — ACETAMINOPHEN 500 MG
650 TABLET ORAL EVERY 6 HOURS
Refills: 0 | Status: DISCONTINUED | OUTPATIENT
Start: 2020-03-20 | End: 2020-03-23

## 2020-03-20 RX ORDER — HYDROMORPHONE HYDROCHLORIDE 2 MG/ML
1 INJECTION INTRAMUSCULAR; INTRAVENOUS; SUBCUTANEOUS EVERY 4 HOURS
Refills: 0 | Status: DISCONTINUED | OUTPATIENT
Start: 2020-03-20 | End: 2020-03-23

## 2020-03-20 RX ORDER — POTASSIUM CHLORIDE 20 MEQ
10 PACKET (EA) ORAL
Refills: 0 | Status: DISCONTINUED | OUTPATIENT
Start: 2020-03-20 | End: 2020-03-20

## 2020-03-20 RX ADMIN — OXYCODONE AND ACETAMINOPHEN 1 TABLET(S): 5; 325 TABLET ORAL at 14:11

## 2020-03-20 RX ADMIN — OXYCODONE AND ACETAMINOPHEN 1 TABLET(S): 5; 325 TABLET ORAL at 00:47

## 2020-03-20 RX ADMIN — Medication 100 MILLIGRAM(S): at 08:44

## 2020-03-20 RX ADMIN — PIPERACILLIN AND TAZOBACTAM 25 GRAM(S): 4; .5 INJECTION, POWDER, LYOPHILIZED, FOR SOLUTION INTRAVENOUS at 22:02

## 2020-03-20 RX ADMIN — HYDROMORPHONE HYDROCHLORIDE 1 MILLIGRAM(S): 2 INJECTION INTRAMUSCULAR; INTRAVENOUS; SUBCUTANEOUS at 17:05

## 2020-03-20 RX ADMIN — Medication 100 MILLIGRAM(S): at 01:40

## 2020-03-20 RX ADMIN — Medication 40 MILLIEQUIVALENT(S): at 14:12

## 2020-03-20 RX ADMIN — Medication 2: at 21:54

## 2020-03-20 RX ADMIN — Medication 8: at 16:36

## 2020-03-20 RX ADMIN — Medication 1 TABLET(S): at 16:37

## 2020-03-20 RX ADMIN — PIPERACILLIN AND TAZOBACTAM 25 GRAM(S): 4; .5 INJECTION, POWDER, LYOPHILIZED, FOR SOLUTION INTRAVENOUS at 05:32

## 2020-03-20 RX ADMIN — OXYCODONE AND ACETAMINOPHEN 1 TABLET(S): 5; 325 TABLET ORAL at 15:12

## 2020-03-20 RX ADMIN — HYDROMORPHONE HYDROCHLORIDE 1 MILLIGRAM(S): 2 INJECTION INTRAMUSCULAR; INTRAVENOUS; SUBCUTANEOUS at 22:37

## 2020-03-20 RX ADMIN — INSULIN GLARGINE 46 UNIT(S): 100 INJECTION, SOLUTION SUBCUTANEOUS at 21:53

## 2020-03-20 RX ADMIN — HYDROMORPHONE HYDROCHLORIDE 1 MILLIGRAM(S): 2 INJECTION INTRAMUSCULAR; INTRAVENOUS; SUBCUTANEOUS at 22:02

## 2020-03-20 RX ADMIN — Medication 100 MILLIGRAM(S): at 21:53

## 2020-03-20 RX ADMIN — Medication 15 UNIT(S): at 16:36

## 2020-03-20 RX ADMIN — HYDROMORPHONE HYDROCHLORIDE 1 MILLIGRAM(S): 2 INJECTION INTRAMUSCULAR; INTRAVENOUS; SUBCUTANEOUS at 16:35

## 2020-03-20 RX ADMIN — OXYCODONE AND ACETAMINOPHEN 1 TABLET(S): 5; 325 TABLET ORAL at 00:17

## 2020-03-20 RX ADMIN — Medication 100 MILLIGRAM(S): at 14:17

## 2020-03-20 RX ADMIN — PIPERACILLIN AND TAZOBACTAM 25 GRAM(S): 4; .5 INJECTION, POWDER, LYOPHILIZED, FOR SOLUTION INTRAVENOUS at 14:15

## 2020-03-20 NOTE — PROGRESS NOTE ADULT - SUBJECTIVE AND OBJECTIVE BOX
59y Male is under our care for gas gangrene and acute osteomyelitis (acute)of right 5th toe / foot and cellulitis of right foot and leg. Patient just returned from PACU after having right foot debridement. As per podiatry note, more pockets of pus were found and drained, culture was sent. Patient will need further intervention in a few days which may require graft placement and application of wound vac. Overall, patient is doing well and just has expected incisional foot pain. No fevers and wbc count continues to trend down.     REVIEW OF SYSTEMS:  [  ] Not able to illicit  General: no fevers no malaise  Chest: no cough no sob  GI: no nvd  : no urinary sxs   Skin: no rashes  Musculoskeletal: no trauma no LBP +foot pain  Neuro: no ha's no dizziness     MEDS:  clindamycin IVPB 900 milliGRAM(s) IV Intermittent every 8 hours  piperacillin/tazobactam IVPB.. 3.375 Gram(s) IV Intermittent every 8 hours       ALLERGIES: Allergies    No Known Allergies    Intolerances      VITALS:  Vital Signs Last 24 Hrs  T(C): 36.7 (20 Mar 2020 11:38), Max: 37.2 (19 Mar 2020 20:31)  T(F): 98 (20 Mar 2020 11:38), Max: 99 (19 Mar 2020 20:31)  HR: 76 (20 Mar 2020 11:53) (74 - 95)  BP: 125/79 (20 Mar 2020 11:53) (98/66 - 159/94)  BP(mean): 93 (20 Mar 2020 11:53) (74 - 93)  RR: 22 (20 Mar 2020 11:53) (14 - 22)  SpO2: 95% (20 Mar 2020 11:53) (93% - 98%)      PHYSICAL EXAM:  HEENT: n/a  Neck: supple no LN's   Respiratory: lungs clear no rales  Cardiovascular: S1 S2 reg no murmurs  Gastrointestinal: +BS with soft, large abdominal pannus; nontender  Extremities: decreasing RLE edema  Skin: right foot wrapped with post-op dressing  Ortho: n/a  Neuro: awake and alert      LABS/DIAGNOSTIC TESTS:                        10.7   16.35 )-----------( 376      ( 20 Mar 2020 07:30 )             33.4   WBC Count: 16.35 K/uL (03-20 @ 07:30)  WBC Count: 19.21 K/uL (03-19 @ 07:06)  WBC Count: 23.60 K/uL (03-18 @ 07:12)  WBC Count: 25.59 K/uL (03-17 @ 07:46)  WBC Count: 28.45 K/uL (03-16 @ 17:55)    03-20    134<L>  |  100  |  10  ----------------------------<  200<H>  3.4<L>   |  25  |  0.91    Ca    8.6      20 Mar 2020 07:30        CULTURES:   .Surgical Swab  03-17 @ 10:04   No growth  --  --      .Blood  03-16 @ 22:40   No growth to date.  --  --      .Blood  03-16 @ 22:39   No growth to date.  --  --        RADIOLOGY:  no new studies

## 2020-03-20 NOTE — PROGRESS NOTE ADULT - SUBJECTIVE AND OBJECTIVE BOX
Podiatry interval HPI: Pt s/p 3 days OR partial 5th ray amp with I&D sec to Nec fasc with soft tissue emphysema Right foot. Pt denies F/C/N/V/SOB. WBC trending down. Podiatry monitoring wbc and erythema with streaking anterior right leg. Pt is cleared and consented for surgery this morning with  for debridement and possible graft application.       S : 59y year old Male seen at bedside for Right foot swelling, rednees and blister formation. Pt states that he thinks he stepped on a splinter or some small foreign body about 1 1/2 week ago. He states that at that time he had small amount of discomfort which has been getting worst since Thursday. Pt states he went to urgent care this morning and doctor asked him to go to ED. Pt denies any F/C/N/V/SOB.    PMH: Pt denies any PMH but states that his fingerstick in hospital is high  PSH:     Allergies:No Known Allergies    LABS:                        10.7   19.21 )-----------( 375      ( 19 Mar 2020 07:06 )             34.1     19 Mar 2020 07:06    133    |  99     |  13     ----------------------------<  261    3.8     |  21     |  1.06     Ca    8.6        19 Mar 2020 07:06        Vital Signs Last 24 Hrs  T(C): 36.4 (20 Mar 2020 05:25), Max: 37.2 (19 Mar 2020 20:31)  T(F): 97.5 (20 Mar 2020 05:25), Max: 99 (19 Mar 2020 20:31)  HR: 85 (20 Mar 2020 05:25) (85 - 93)  BP: 158/94 (20 Mar 2020 05:25) (122/74 - 158/94)  BP(mean): --  RR: 18 (20 Mar 2020 05:25) (18 - 18)  SpO2: 95% (20 Mar 2020 05:25) (93% - 95%)    O:   General: Pleasant  male NAD & AOX3.      RLE focused  Dressing left intact: as per last examin  Right foot status post surgery day three , Rt foot  erythema and edema improved  , necrotic patch about 4x3cm at right dorsal foot, surgical flap close to base of 5th metatarsal exhibit some microvascular sclerosis( questionable viability of flap). no pus was drained today, exposed tendon at dorsal foot. exposed base of 5th metatrsal. third interspace discoloration and fluctuation noted.  Vascular: Dorsalis Pedis and Posterior Tibial pulses 0/4. audible via doppler right foot.  Capillary re-fill time less then 3 seconds digits 1-3, less than  sec  4th toe.  Neuro: Protective sensation intact to the level of the digits bilateral.      A: s/p OR I&D and partial 5th ray resection Right  foot Necrotizing fascitis( 3/17)    Culture - Surgical Swab (03.17.20 @ 10:04)    Specimen Source: .Surgical Swab    Culture Results:   Normal skin ward isolated        P:   Chart reviewed and Patient evaluated  Discussed diagnosis and treatment with patient  bed side I&d right foot  in ED with drainage of 10 cc of pus 3/17  OR Incision and drainage and partial 5th ray resection right foot 3/17  Podiatry monitoring wbc trend and erythema and leg streaking.  second OR debridement of non viable tissue with possible graft application this AM   Pt NPO  Vasc consult appreciated   Post op xray and MRI reviewed  Continue with IV antibiotics   JESUS was reviewed  Weight bearing as tolerated  Discussed importance of daily foot examinations and proper shoe gear and to importance of lower Fasting Blood Glucose levels.   Podiatry will follow while in house.  Discussed with Attending Dr Eng

## 2020-03-20 NOTE — PROGRESS NOTE ADULT - ASSESSMENT
1.	Gas Gangrene and acute osteo of right 5th toe/ foot - s/p amputation  2.	Cellulitis of right foot/ leg  3.	Leukocytosis - trending down  ·	cont Clindamycin 900mgs iv q8hrs   ·	cont zosyn 3.375gms iv q8hrs    ·	awaiting culture results 1.	Gas Gangrene and acute osteo of right 5th toe/ foot - s/p amputation  2.	Cellulitis of right foot/ leg  3.	Leukocytosis - trending down  ·	cont Clindamycin 900mgs iv q8hrs will DC in 1-2 days.  ·	cont zosyn 3.375gms iv q8hrs    ·	awaiting culture results

## 2020-03-20 NOTE — PROGRESS NOTE ADULT - PROBLEM SELECTOR PLAN 1
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  -s/p bedside I&d right foot  in ED with drainage of 10 cc of pus 3/17  -s/p OR Incision and drainage and partial 5th ray resection right foot 3/17  -pathology noted with necrotic area with underlying bone; nonviable margins  -for second OR debridement of non viable tissue with graft placement and application of wound vac 3/20  -continue clindamycin 900mg q8 and zosyn   -BCx (NGTD), wound cultures negative  -ID Dr Mcadams, podiatry notes appreciated  ***patient RCRI class I,  with 6% 30-day risk of death, MI or cardiac arrest, LARSON scale with 0.1% risk of MI or cardiac arrest, patient is at moderate risk for low to moderate risk procedure 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  -s/p bedside I&d right foot  in ED with drainage of 10 cc of pus 3/17  -s/p OR Incision and drainage and partial 5th ray resection right foot 3/17  -pathology noted with necrotic area with underlying bone; nonviable margins  -s/p second OR debridement of necrotic foot ulcer with drainage of pus pockets 3/20  -Pt at risk of limb loss, will need another surgery in few days (graft placement with wound vac application as per podiatry  -continue clindamycin 900mg q8 and zosyn   -BCx (NGTD), wound cultures negative  -ID Dr Mcadams, podiatry notes appreciated  ***patient RCRI class I,  with 6% 30-day risk of death, MI or cardiac arrest, LARSON scale with 0.1% risk of MI or cardiac arrest, patient is at moderate risk for low to moderate risk procedure

## 2020-03-20 NOTE — PROGRESS NOTE ADULT - SUBJECTIVE AND OBJECTIVE BOX
Interval Events:  pt in and    Allergies    No Known Allergies    Intolerances      Endocrine/Metabolic Medications:  insulin glargine Injectable (LANTUS) 40 Unit(s) SubCutaneous at bedtime  insulin lispro (HumaLOG) corrective regimen sliding scale   SubCutaneous three times a day before meals  insulin lispro (HumaLOG) corrective regimen sliding scale   SubCutaneous at bedtime  insulin lispro Injectable (HumaLOG) 15 Unit(s) SubCutaneous three times a day before meals      Vital Signs Last 24 Hrs  T(C): 36.7 (20 Mar 2020 14:19), Max: 37.2 (19 Mar 2020 20:31)  T(F): 98 (20 Mar 2020 14:19), Max: 99 (19 Mar 2020 20:31)  HR: 78 (20 Mar 2020 14:19) (74 - 95)  BP: 124/76 (20 Mar 2020 14:19) (98/66 - 159/94)  BP(mean): 93 (20 Mar 2020 11:53) (74 - 93)  RR: 18 (20 Mar 2020 14:19) (14 - 22)  SpO2: 96% (20 Mar 2020 14:19) (94% - 98%)  Height (cm): 185.42 (03-20 @ 08:52)    PHYSICAL EXAM  All physical exam findings normal, except those marked:  General:	Alert, active, cooperative, NAD, well hydrated  .		[] Abnormal:  Neck		Normal: supple, no cervical adenopathy, no palpable thyroid  .		[] Abnormal:  Cardiovascular	Normal: regular rate, normal S1, S2, no murmurs  .		[] Abnormal:  Respiratory	Normal: no chest wall deformity, normal respiratory pattern, CTA B/L  .		[] Abnormal:  Abdominal	Normal: soft, ND, NT, bowel sounds present, no masses, no organomegaly  .		[] Abnormal:  		Normal normal genitalia, testes descended, circumcised/uncircumcised  .		Mer stage:			Breast mer:  .		Menstrual history:  .		[] Abnormal:  Extremities	Normal: FROM x4  .		[] Abnormal:  Skin		Normal: intact and not indurated, no rash, no acanthosis nigricans  .		[] Abnormal:  Neurologic	Normal: grossly intact  .		[] Abnormal:    LABS                        10.7   16.35 )-----------( 376      ( 20 Mar 2020 07:30 )             33.4                               134    |  100    |  10                  Calcium: 8.6   / iCa: x      (03-20 @ 07:30)    ----------------------------<  200       Magnesium: x                                3.4     |  25     |  0.91             Phosphorous: x          CAPILLARY BLOOD GLUCOSE      POCT Blood Glucose.: 313 mg/dL (20 Mar 2020 16:25)  POCT Blood Glucose.: 212 mg/dL (20 Mar 2020 11:04)  POCT Blood Glucose.: 199 mg/dL (20 Mar 2020 05:47)  POCT Blood Glucose.: 219 mg/dL (20 Mar 2020 00:17)  POCT Blood Glucose.: 215 mg/dL (19 Mar 2020 21:14)        Assesment/plan    Uncontrolled diabetes mellitus:  new onset with severe hyperglycemia  change lantus to 46 units  cont huamlog 15 ac tid  Moderate Sliding scale   start metformin 500 BID as outpatient upon discharge   fsg ac and hs  nutrition eval  dm teaching   d/w hs.     Sepsis: due foot infection  s/p debridement  cont iv abx  await c and s  f/u id and podiatry recs.

## 2020-03-20 NOTE — PROGRESS NOTE ADULT - SUBJECTIVE AND OBJECTIVE BOX
PGY 2 Note discussed with primary attending.    Patient is a 59y old  Male who presents with a chief complaint of Right foot swelling (20 Mar 2020 06:36)      INTERVAL HPI/OVERNIGHT EVENTS: patient assessed prior to OR this AM, reported pain in R foot.     MEDICATIONS  (STANDING):  influenza   Vaccine 0.5 milliLiter(s) IntraMuscular once  sodium chloride 0.9%. 1000 milliLiter(s) (100 mL/Hr) IV Continuous <Continuous>    MEDICATIONS  (PRN):      Allergies    No Known Allergies    Intolerances      REVIEW OF SYSTEMS:  CONSTITUTIONAL: No fever, weight loss, or fatigue  RESPIRATORY: No cough, wheezing, chills or hemoptysis; No shortness of breath  CARDIOVASCULAR: No chest pain, palpitations, dizziness, or leg swelling  GASTROINTESTINAL: No abdominal or epigastric pain. No nausea, vomiting, or hematemesis; No diarrhea or constipation. No melena or hematochezia.  NEUROLOGICAL: No headaches, memory loss, loss of strength, numbness, or tremors  SKIN: wound on right foot      Vital Signs Last 24 Hrs  T(C): 36.7 (20 Mar 2020 11:38), Max: 37.2 (19 Mar 2020 20:31)  T(F): 98 (20 Mar 2020 11:38), Max: 99 (19 Mar 2020 20:31)  HR: 76 (20 Mar 2020 11:53) (74 - 95)  BP: 125/79 (20 Mar 2020 11:53) (98/66 - 159/94)  BP(mean): 93 (20 Mar 2020 11:53) (74 - 93)  RR: 22 (20 Mar 2020 11:53) (14 - 22)  SpO2: 95% (20 Mar 2020 11:53) (93% - 98%)    PHYSICAL EXAM:  GENERAL: NAD, well-groomed, well-developed, obese  HEAD:  Atraumatic, Normocephalic  CHEST/LUNG: Clear to auscultation bilaterally; No rales, rhonchi, wheezing, or rubs  HEART: Regular rate and rhythm; No murmurs, rubs, or gallops  ABDOMEN: Soft, Nontender, Nondistended; Bowel sounds present  NERVOUS SYSTEM:  Alert & Oriented X3, Good concentration; Motor Strength 5/5 B/L   EXTREMITIES:  2+ Peripheral Pulses, No clubbing, cyanosis, or edema; right foot s/p partial amputation of 5th ray, dressing c/i      LABS:                        10.7   16.35 )-----------( 376      ( 20 Mar 2020 07:30 )             33.4     03-20    134<L>  |  100  |  10  ----------------------------<  200<H>  3.4<L>   |  25  |  0.91    Ca    8.6      20 Mar 2020 07:30          CAPILLARY BLOOD GLUCOSE      POCT Blood Glucose.: 212 mg/dL (20 Mar 2020 11:04)  POCT Blood Glucose.: 199 mg/dL (20 Mar 2020 05:47)  POCT Blood Glucose.: 219 mg/dL (20 Mar 2020 00:17)  POCT Blood Glucose.: 215 mg/dL (19 Mar 2020 21:14)  POCT Blood Glucose.: 246 mg/dL (19 Mar 2020 16:02)    Consultant(s) Notes Reviewed:  [ x ] YES  [ ] NO

## 2020-03-20 NOTE — CHART NOTE - NSCHARTNOTEFT_GEN_A_CORE
Pt is s/p R foot debridement of necrotic foot ulcer second stage 3/20. more pus pockets were drained . Pt at risk of limb loss. will need another surgery in few days( graft placement with wound vac application. Podiatry monitoring white count, edema, erythema.

## 2020-03-21 ENCOUNTER — TRANSCRIPTION ENCOUNTER (OUTPATIENT)
Age: 60
End: 2020-03-21

## 2020-03-21 LAB
ANION GAP SERPL CALC-SCNC: 9 MMOL/L — SIGNIFICANT CHANGE UP (ref 5–17)
BUN SERPL-MCNC: 9 MG/DL — SIGNIFICANT CHANGE UP (ref 7–18)
CALCIUM SERPL-MCNC: 8.6 MG/DL — SIGNIFICANT CHANGE UP (ref 8.4–10.5)
CHLORIDE SERPL-SCNC: 101 MMOL/L — SIGNIFICANT CHANGE UP (ref 96–108)
CO2 SERPL-SCNC: 27 MMOL/L — SIGNIFICANT CHANGE UP (ref 22–31)
CREAT SERPL-MCNC: 0.92 MG/DL — SIGNIFICANT CHANGE UP (ref 0.5–1.3)
CULTURE RESULTS: SIGNIFICANT CHANGE UP
CULTURE RESULTS: SIGNIFICANT CHANGE UP
FERRITIN SERPL-MCNC: 609 NG/ML — HIGH (ref 30–400)
GLUCOSE BLDC GLUCOMTR-MCNC: 114 MG/DL — HIGH (ref 70–99)
GLUCOSE BLDC GLUCOMTR-MCNC: 147 MG/DL — HIGH (ref 70–99)
GLUCOSE BLDC GLUCOMTR-MCNC: 203 MG/DL — HIGH (ref 70–99)
GLUCOSE BLDC GLUCOMTR-MCNC: 233 MG/DL — HIGH (ref 70–99)
GLUCOSE SERPL-MCNC: 204 MG/DL — HIGH (ref 70–99)
HCT VFR BLD CALC: 30.2 % — LOW (ref 39–50)
HGB BLD-MCNC: 9.4 G/DL — LOW (ref 13–17)
IRON SATN MFR SERPL: 22 % — SIGNIFICANT CHANGE UP (ref 20–55)
IRON SATN MFR SERPL: 28 UG/DL — LOW (ref 65–170)
MCHC RBC-ENTMCNC: 28.1 PG — SIGNIFICANT CHANGE UP (ref 27–34)
MCHC RBC-ENTMCNC: 31.1 GM/DL — LOW (ref 32–36)
MCV RBC AUTO: 90.4 FL — SIGNIFICANT CHANGE UP (ref 80–100)
NRBC # BLD: 0 /100 WBCS — SIGNIFICANT CHANGE UP (ref 0–0)
PLATELET # BLD AUTO: 361 K/UL — SIGNIFICANT CHANGE UP (ref 150–400)
POTASSIUM SERPL-MCNC: 3.9 MMOL/L — SIGNIFICANT CHANGE UP (ref 3.5–5.3)
POTASSIUM SERPL-SCNC: 3.9 MMOL/L — SIGNIFICANT CHANGE UP (ref 3.5–5.3)
RBC # BLD: 3.34 M/UL — LOW (ref 4.2–5.8)
RBC # FLD: 13.8 % — SIGNIFICANT CHANGE UP (ref 10.3–14.5)
SODIUM SERPL-SCNC: 137 MMOL/L — SIGNIFICANT CHANGE UP (ref 135–145)
SPECIMEN SOURCE: SIGNIFICANT CHANGE UP
SPECIMEN SOURCE: SIGNIFICANT CHANGE UP
TIBC SERPL-MCNC: 125 UG/DL — LOW (ref 250–450)
TRANSFERRIN SERPL-MCNC: 109 MG/DL — LOW (ref 200–360)
UIBC SERPL-MCNC: 97 UG/DL — LOW (ref 110–370)
WBC # BLD: 14.4 K/UL — HIGH (ref 3.8–10.5)
WBC # FLD AUTO: 14.4 K/UL — HIGH (ref 3.8–10.5)

## 2020-03-21 RX ORDER — HEPARIN SODIUM 5000 [USP'U]/ML
5000 INJECTION INTRAVENOUS; SUBCUTANEOUS EVERY 8 HOURS
Refills: 0 | Status: DISCONTINUED | OUTPATIENT
Start: 2020-03-21 | End: 2020-03-23

## 2020-03-21 RX ADMIN — Medication 1 TABLET(S): at 08:19

## 2020-03-21 RX ADMIN — PIPERACILLIN AND TAZOBACTAM 25 GRAM(S): 4; .5 INJECTION, POWDER, LYOPHILIZED, FOR SOLUTION INTRAVENOUS at 05:21

## 2020-03-21 RX ADMIN — Medication 100 MILLIGRAM(S): at 22:03

## 2020-03-21 RX ADMIN — HYDROMORPHONE HYDROCHLORIDE 1 MILLIGRAM(S): 2 INJECTION INTRAMUSCULAR; INTRAVENOUS; SUBCUTANEOUS at 12:36

## 2020-03-21 RX ADMIN — HYDROMORPHONE HYDROCHLORIDE 1 MILLIGRAM(S): 2 INJECTION INTRAMUSCULAR; INTRAVENOUS; SUBCUTANEOUS at 05:59

## 2020-03-21 RX ADMIN — HYDROMORPHONE HYDROCHLORIDE 1 MILLIGRAM(S): 2 INJECTION INTRAMUSCULAR; INTRAVENOUS; SUBCUTANEOUS at 05:21

## 2020-03-21 RX ADMIN — Medication 100 MILLIGRAM(S): at 05:21

## 2020-03-21 RX ADMIN — INSULIN GLARGINE 46 UNIT(S): 100 INJECTION, SOLUTION SUBCUTANEOUS at 22:03

## 2020-03-21 RX ADMIN — HYDROMORPHONE HYDROCHLORIDE 1 MILLIGRAM(S): 2 INJECTION INTRAMUSCULAR; INTRAVENOUS; SUBCUTANEOUS at 22:04

## 2020-03-21 RX ADMIN — Medication 15 UNIT(S): at 08:18

## 2020-03-21 RX ADMIN — HYDROMORPHONE HYDROCHLORIDE 1 MILLIGRAM(S): 2 INJECTION INTRAMUSCULAR; INTRAVENOUS; SUBCUTANEOUS at 13:10

## 2020-03-21 RX ADMIN — PIPERACILLIN AND TAZOBACTAM 25 GRAM(S): 4; .5 INJECTION, POWDER, LYOPHILIZED, FOR SOLUTION INTRAVENOUS at 13:28

## 2020-03-21 RX ADMIN — PIPERACILLIN AND TAZOBACTAM 25 GRAM(S): 4; .5 INJECTION, POWDER, LYOPHILIZED, FOR SOLUTION INTRAVENOUS at 22:03

## 2020-03-21 RX ADMIN — Medication 1 TABLET(S): at 17:06

## 2020-03-21 RX ADMIN — OXYCODONE AND ACETAMINOPHEN 1 TABLET(S): 5; 325 TABLET ORAL at 18:00

## 2020-03-21 RX ADMIN — Medication 15 UNIT(S): at 17:06

## 2020-03-21 RX ADMIN — HYDROMORPHONE HYDROCHLORIDE 1 MILLIGRAM(S): 2 INJECTION INTRAMUSCULAR; INTRAVENOUS; SUBCUTANEOUS at 22:35

## 2020-03-21 RX ADMIN — OXYCODONE AND ACETAMINOPHEN 1 TABLET(S): 5; 325 TABLET ORAL at 17:09

## 2020-03-21 RX ADMIN — HEPARIN SODIUM 5000 UNIT(S): 5000 INJECTION INTRAVENOUS; SUBCUTANEOUS at 22:04

## 2020-03-21 RX ADMIN — Medication 100 MILLIGRAM(S): at 13:28

## 2020-03-21 RX ADMIN — Medication 15 UNIT(S): at 11:58

## 2020-03-21 RX ADMIN — Medication 4: at 08:19

## 2020-03-21 RX ADMIN — Medication 4: at 11:58

## 2020-03-21 NOTE — DISCHARGE NOTE PROVIDER - HOSPITAL COURSE
59 y M from home, walks independently with no Significant PMHx and PSHx of Right achilles tendon repair presented to ED from Urgent care for worsening Right foot swelling,discoloration  and pain since 4 days . Pt states that he thinks he stepped on a splinter or some small foreign body about 1 1/2 week ago. He states that at that time he had small amount of discomfort which has been getting worst since Thursday. Since thursday his foot has become more discolored and he see some discharge coming out. Pt went to urgent care this morning and doctor asked him to go to ED. Also pt c/o nocturia , increased thirst since couple of months now. Pt Denies any Fever, chills, SOB, CP , cough , abd pain , nausea,vomiting ,diarrhea , dysuria or any other complaint.         Patient in ER met sepsis criteria with leucocytosis, tachycardia and lactate of 2.9, also noted with right foot edema , erythema and 5th toe discoloration, erythema edema  extending to entire foot . interdigital maceration mainly located at 4th interspace, red streaking at ant aspect of right leg. blistering with serosanguineous fluid at dorsal and plantar foot. 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 His pathology was noted with necrotic area with underlying bone; nonviable margins hence taken to OR gain on  3/20 for debridement of necrotic foot ulcer with drainage of pus pockets. Pt at risk of limb loss, will need another surgery in few days (graft placement with wound vac application as per podiatry which was performed on [id number]. He was completed course of clinda and was continued on zosyn, bcx noted negative, surgical cultures noted with finegoldia magna. ABIs noted normal and MRI with no evidence of OM.         Patient also presented with mild DKA with bloogd glucose of 500, AG corrected for albumin to 21.5 and positive acetone in blood and urine in setting of infection, with a1c of 14.4, endo Dr Wilkerson was consulted and insulin regimen adjusted, to be discharged on insulin +metformin 500mg bid        INCOMPLETE

## 2020-03-21 NOTE — PROGRESS NOTE ADULT - SUBJECTIVE AND OBJECTIVE BOX
Patient is a 59y old  Male who presents with a chief complaint of Right foot swelling (20 Mar 2020 06:36)      INTERVAL HPI/OVERNIGHT EVENTS: patient assessed prior to OR this AM, reported pain in R foot.     MEDICATIONS  (STANDING):  influenza   Vaccine 0.5 milliLiter(s) IntraMuscular once  sodium chloride 0.9%. 1000 milliLiter(s) (100 mL/Hr) IV Continuous <Continuous>    MEDICATIONS  (PRN):      Allergies    No Known Allergies    Intolerances      REVIEW OF SYSTEMS:  CONSTITUTIONAL: No fever, weight loss, or fatigue  RESPIRATORY: No cough, wheezing, chills or hemoptysis; No shortness of breath  CARDIOVASCULAR: No chest pain, palpitations, dizziness, or leg swelling  GASTROINTESTINAL: No abdominal or epigastric pain. No nausea, vomiting, or hematemesis; No diarrhea or constipation. No melena or hematochezia.  NEUROLOGICAL: No headaches, memory loss, loss of strength, numbness, or tremors  SKIN: wound on right foot    T(C): 37.1 (03-21-20 @ 20:28), Max: 37.1 (03-21-20 @ 20:28)  HR: 85 (03-21-20 @ 20:28) (82 - 85)  BP: 137/87 (03-21-20 @ 20:28) (137/87 - 138/74)  RR: 17 (03-21-20 @ 20:28) (17 - 18)  SpO2: 97% (03-21-20 @ 20:28) (97% - 97%)      PHYSICAL EXAM:  GENERAL: NAD, well-groomed, well-developed, obese  HEAD:  Atraumatic, Normocephalic  CHEST/LUNG: Clear to auscultation bilaterally; No rales, rhonchi, wheezing, or rubs  HEART: Regular rate and rhythm; No murmurs, rubs, or gallops  ABDOMEN: Soft, Nontender, Nondistended; Bowel sounds present  NERVOUS SYSTEM:  Alert & Oriented X3, Good concentration; Motor Strength 5/5 B/L   EXTREMITIES:  2+ Peripheral Pulses, No clubbing, cyanosis, or edema; right foot s/p partial amputation of 5th ray, dressing c/i                          9.4    14.40 )-----------( 361      ( 21 Mar 2020 06:47 )             30.2               137|101|9<204  3.9|27|0.92  8.6,--,--  03-21 @ 06:47  ----------------------------<  200<H>  3.4<L>   |  25  |  0.91    Ca    8.6      20 Mar 2020 07:30          CAPILLARY BLOOD GLUCOSE    CAPILLARY BLOOD GLUCOSE      POCT Blood Glucose.: 147 mg/dL (21 Mar 2020 21:29)  POCT Blood Glucose.: 114 mg/dL (21 Mar 2020 16:30)  POCT Blood Glucose.: 233 mg/dL (21 Mar 2020 11:27)  POCT Blood Glucose.: 203 mg/dL (21 Mar 2020 08:02)    Consultant(s) Notes Reviewed:  [ x ] YES  [ ] NO

## 2020-03-21 NOTE — PROGRESS NOTE ADULT - PROBLEM SELECTOR PLAN 2
s/p 4 days OR partial 5th ray amp with I&D, and s/p 1 day second OR debridement> sec to Nec fasc with soft tissue emphysema Right foot  Podiatry, ID following.

## 2020-03-21 NOTE — DISCHARGE NOTE PROVIDER - NSDCCPCAREPLAN_GEN_ALL_CORE_FT
PRINCIPAL DISCHARGE DIAGNOSIS  Diagnosis: Gas gangrene of foot  Assessment and Plan of Treatment: You presented with discharge , pain and discoloration of your right foot with noted xray finding of possible gas forming bacteria causing infection of your foot. You underwent partial amputation of your right 5th digit however continued to infection requiring multiple OR debridements and drainage of pus with placement of graft after control of oyur infection. You were given iv antibiotics during this time, your blood cultures were negative but wound cultures identified bacteria called Jerardo magna. Your MRI did not show eviednce of bone infection. We also tested your blood flow to your legs which seems to fairly good at this point. Continue antibiotics as prescribed to complete course and follow with your primary doctor for continued care.      SECONDARY DISCHARGE DIAGNOSES  Diagnosis: Uncontrolled diabetes mellitus  Assessment and Plan of Treatment: You presented with elevated blood sugars with a1c of 14.4 (normal <5.7), which reflects high blood sugars for at leqast last 3 months. You were evaluated by our endocrinologist and your insulin regimen has been adjusted. Cotninue with insulin as prescribed in addition to metformin 500mg twice a day.   You are ecnouraged a diet with consistent carbohydrates and exercise as tolerated, at least 30 minutes three days a week.   Follow up with Dr Wilkerson for continued monitoring of your progress and to make any necessary changes in your medication regimen.    Diagnosis: KESHIA (acute kidney injury)  Assessment and Plan of Treatment: You had elevated creatinine level on admission suggestive of kidney injury which was in setting of sepsis and elevted blood sugars causing a transient low volume state. Your kidney function is now normalized however given your diabetes you are risk for long term effects on your kidney and are encouraged to maintain good control of your blood sugars . Follow up with your primary doctor for continued monitoring of your kidney function.    Diagnosis: Hyponatremia  Assessment and Plan of Treatment: You presented with low sdium levels likely in setting of elevated blod sugars which has since resolved with good control of your blood sugars.

## 2020-03-21 NOTE — DISCHARGE NOTE PROVIDER - NSDCMRMEDTOKEN_GEN_ALL_CORE_FT
alcohol swabs : Apply topically to affected area 4 times a day MDD:4  glucometer (per patient&#x27;s insurance): Test blood sugars four times a day. Dispense #1 glucometer. MDD:4  lancets: 1 application subcutaneously 4 times a day MDD:4 alcohol swabs : Apply topically to affected area 4 times a day MDD:4  glucometer (per patient&#x27;s insurance): Test blood sugars four times a day. Dispense #1 glucometer. MDD:4  HumaLOG KwikPen 100 units/mL injectable solution: 15 unit(s) subcutaneously 3 times a day (before meals)  HumaLOG KwikPen 100 units/mL injectable solution: 15 unit(s) subcutaneous 3 times a day (with meals) MDD:45 units  INVanz 1 g injection: 1 gram(s) intravenously every 24 hours MDD:1  lancets: 1 application subcutaneously 4 times a day MDD:4  Lantus Solostar Pen 100 units/mL subcutaneous solution: 46 unit(s) subcutaneous once a day (at bedtime) MDD:46 units  metFORMIN 500 mg oral tablet: 1 tab(s) orally 2 times a day MDD:2  oxycodone-acetaminophen 5 mg-325 mg oral tablet: 1 tab(s) orally every 4 hours, As needed, Severe Pain (7 - 10) MDD:4  test strips (per patient&#x27;s insurance): 1 application subcutaneously 4 times a day. ** Compatible with patient&#x27;s glucometer ** MDD:4

## 2020-03-21 NOTE — PROGRESS NOTE ADULT - ASSESSMENT
1.	Gas Gangrene and acute osteo of right 5th toe/ foot - s/p amputation  2.	Cellulitis of right foot/ leg - improving  3.	Leukocytosis - trending down  ·	DC Clindamycin   ·	cont zosyn 3.375gms iv q8hrs

## 2020-03-21 NOTE — PROGRESS NOTE ADULT - SUBJECTIVE AND OBJECTIVE BOX
Podiatry interval HPI: Pt s/p 4 days OR partial 5th ray amp with I&D, and s/p 1 day second OR debridement> sec to Nec fasc with soft tissue emphysema Right foot. Pt denies F/C/N/V/SOB. WBC trending down. about 4 cc of pus was drained from lateral ulcer area.       S : 59y year old Male seen at bedside for Right foot swelling, rednees and blister formation. Pt states that he thinks he stepped on a splinter or some small foreign body about 1 1/2 week ago. He states that at that time he had small amount of discomfort which has been getting worst since Thursday. Pt states he went to urgent care this morning and doctor asked him to go to ED. Pt denies any F/C/N/V/SOB.    PMH: Pt denies any PMH but states that his fingerstick in hospital is high  PSH:     Allergies:No Known Allergies    LABS:                                 9.4    14.40 )-----------( 361      ( 21 Mar 2020 06:47 )             30.2     03-21    137  |  101  |  9   ----------------------------<  204<H>  3.9   |  27  |  0.92    Ca    8.6      21 Mar 2020 06:47      Vital Signs Last 24 Hrs  T(C): 36.8 (21 Mar 2020 05:38), Max: 37.3 (20 Mar 2020 20:54)  T(F): 98.2 (21 Mar 2020 05:38), Max: 99.1 (20 Mar 2020 20:54)  HR: 82 (21 Mar 2020 05:38) (78 - 92)  BP: 144/89 (21 Mar 2020 05:38) (124/76 - 144/89)  BP(mean): --  RR: 18 (21 Mar 2020 05:38) (18 - 18)  SpO2: 96% (21 Mar 2020 05:38) (95% - 96%)    O:   General: Pleasant  male NAD & AOX3.      RLE focused  Dressing was removed and right foot was examined, ant leg streaking improved significantle, dorasal foot ulcer have exposed tendons but no necrotic tissue, plantar lateral ulcer area lateral to 4th toe showing some necrotic tissue. About 4cc of pus was drained from that area, skin surrounding ulcer still showing erythema but edema is significantly improved. Inter digital area 4 has some fluctuation. Interdigital area 2 and 3 showing maceration. Pt at risk of losing foot.  Vascular: Dorsalis Pedis and Posterior Tibial pulses 0/4. audible via doppler right foot.  Capillary re-fill time less then 3 seconds digits 1-3, less than  5 sec  4th toe.  Neuro: Protective sensation intact to the level of the digits bilateral.      A: s/p OR I&D and partial 5th ray resection Right  foot Necrotizing fascitis( 3/17)      s/p OR second debridement and abscess drainage 3/20 Right foot            P:   Chart reviewed and Patient evaluated  Discussed diagnosis and treatment with patient. Pt is aware that he is at risk of loosing his right foot.  bed side I&d right foot  in ED with drainage of 10 cc of pus 3/17  OR Incision and drainage and partial 5th ray resection right foot 3/17  Second OR debridement and abscess drainage 3/20  another 4 cc of pus was drained from foot.  Podiatry monitoring wbc trend, erythema and leg streaking.  3rd OR debridement of non viable tissue with possible graft application pending infection control( possibly add on Monday 3/23)  Vasc consult appreciated .   Pt blood glucose elevated  Post op xray and MRI reviewed  Continue with IV antibiotics   JESUS was reviewed  Weight bearing as tolerated  Discussed importance of daily foot examinations and proper shoe gear and to importance of lower Fasting Blood Glucose levels.   Podiatry will follow while in house.  Discussed with Attending Dr Eng

## 2020-03-21 NOTE — DISCHARGE NOTE PROVIDER - CARE PROVIDER_API CALL
Ana Paula Bo)  Internal Medicine  [address] [place] [address]  Phone: [phone]  Fax: [phone]  Follow Up Time:     Sujey Wilkerson)  EndocrinologyMetabDiabetes  [address] [place] [address]  Phone: [phone]  Fax: [phone]  Follow Up Time:

## 2020-03-21 NOTE — PROGRESS NOTE ADULT - SUBJECTIVE AND OBJECTIVE BOX
59y Male    Meds:  clindamycin IVPB 900 milliGRAM(s) IV Intermittent every 8 hours  piperacillin/tazobactam IVPB.. 3.375 Gram(s) IV Intermittent every 8 hours    Allergies    No Known Allergies    Intolerances        VITALS:  Vital Signs Last 24 Hrs  T(C): 36.8 (21 Mar 2020 13:22), Max: 37.3 (20 Mar 2020 20:54)  T(F): 98.3 (21 Mar 2020 13:22), Max: 99.1 (20 Mar 2020 20:54)  HR: 82 (21 Mar 2020 13:22) (82 - 92)  BP: 138/74 (21 Mar 2020 13:22) (125/70 - 144/89)  BP(mean): --  RR: 18 (21 Mar 2020 13:22) (18 - 18)  SpO2: 97% (21 Mar 2020 13:22) (95% - 97%)    LABS/DIAGNOSTIC TESTS:                          9.4    14.40 )-----------( 361      ( 21 Mar 2020 06:47 )             30.2         03-21    137  |  101  |  9   ----------------------------<  204<H>  3.9   |  27  |  0.92    Ca    8.6      21 Mar 2020 06:47            CULTURES: .Surgical Swab  right foot abcess  03-20 @ 15:11   No growth  --  --      .Surgical Swab  03-17 @ 10:04   Moderate Finegoldia magna "Susceptibilities not performed"  Normal skin ward isolated  --  --      .Blood  03-16 @ 22:40   No growth to date.  --  --      .Blood  03-16 @ 22:39   No growth to date.  --  --            RADIOLOGY:      ROS:  [  ] UNABLE TO ELICIT 59y Male who is doing well overall but is still having a lot of drainage from his right foot when his dressing was changed by podiatry today, he was told he might have to go for another debridement prior to getting a skin graft. He has no fevers or chills but is having some loose stools.    Meds:  clindamycin IVPB 900 milliGRAM(s) IV Intermittent every 8 hours  piperacillin/tazobactam IVPB.. 3.375 Gram(s) IV Intermittent every 8 hours    Allergies    No Known Allergies    Intolerances        VITALS:  Vital Signs Last 24 Hrs  T(C): 36.8 (21 Mar 2020 13:22), Max: 37.3 (20 Mar 2020 20:54)  T(F): 98.3 (21 Mar 2020 13:22), Max: 99.1 (20 Mar 2020 20:54)  HR: 82 (21 Mar 2020 13:22) (82 - 92)  BP: 138/74 (21 Mar 2020 13:22) (125/70 - 144/89)  BP(mean): --  RR: 18 (21 Mar 2020 13:22) (18 - 18)  SpO2: 97% (21 Mar 2020 13:22) (95% - 97%)    LABS/DIAGNOSTIC TESTS:                          9.4    14.40 )-----------( 361      ( 21 Mar 2020 06:47 )             30.2         03-21    137  |  101  |  9   ----------------------------<  204<H>  3.9   |  27  |  0.92    Ca    8.6      21 Mar 2020 06:47            CULTURES: .Surgical Swab  right foot abcess  03-20 @ 15:11   No growth  --  --      .Surgical Swab  03-17 @ 10:04   Moderate Finegoldia magna "Susceptibilities not performed"  Normal skin ward isolated  --  --      .Blood  03-16 @ 22:40   No growth to date.  --  --      .Blood  03-16 @ 22:39   No growth to date.  --  --            RADIOLOGY:      ROS:  [  ] UNABLE TO ELICIT

## 2020-03-22 ENCOUNTER — TRANSCRIPTION ENCOUNTER (OUTPATIENT)
Age: 60
End: 2020-03-22

## 2020-03-22 LAB
ANION GAP SERPL CALC-SCNC: 8 MMOL/L — SIGNIFICANT CHANGE UP (ref 5–17)
BUN SERPL-MCNC: 7 MG/DL — SIGNIFICANT CHANGE UP (ref 7–18)
CALCIUM SERPL-MCNC: 8.4 MG/DL — SIGNIFICANT CHANGE UP (ref 8.4–10.5)
CHLORIDE SERPL-SCNC: 100 MMOL/L — SIGNIFICANT CHANGE UP (ref 96–108)
CO2 SERPL-SCNC: 28 MMOL/L — SIGNIFICANT CHANGE UP (ref 22–31)
CREAT SERPL-MCNC: 0.81 MG/DL — SIGNIFICANT CHANGE UP (ref 0.5–1.3)
GLUCOSE BLDC GLUCOMTR-MCNC: 126 MG/DL — HIGH (ref 70–99)
GLUCOSE BLDC GLUCOMTR-MCNC: 133 MG/DL — HIGH (ref 70–99)
GLUCOSE BLDC GLUCOMTR-MCNC: 144 MG/DL — HIGH (ref 70–99)
GLUCOSE BLDC GLUCOMTR-MCNC: 154 MG/DL — HIGH (ref 70–99)
GLUCOSE SERPL-MCNC: 125 MG/DL — HIGH (ref 70–99)
HCT VFR BLD CALC: 32 % — LOW (ref 39–50)
HGB BLD-MCNC: 9.9 G/DL — LOW (ref 13–17)
MCHC RBC-ENTMCNC: 28 PG — SIGNIFICANT CHANGE UP (ref 27–34)
MCHC RBC-ENTMCNC: 30.9 GM/DL — LOW (ref 32–36)
MCV RBC AUTO: 90.4 FL — SIGNIFICANT CHANGE UP (ref 80–100)
NRBC # BLD: 0 /100 WBCS — SIGNIFICANT CHANGE UP (ref 0–0)
PLATELET # BLD AUTO: 393 K/UL — SIGNIFICANT CHANGE UP (ref 150–400)
POTASSIUM SERPL-MCNC: 3.9 MMOL/L — SIGNIFICANT CHANGE UP (ref 3.5–5.3)
POTASSIUM SERPL-SCNC: 3.9 MMOL/L — SIGNIFICANT CHANGE UP (ref 3.5–5.3)
RBC # BLD: 3.54 M/UL — LOW (ref 4.2–5.8)
RBC # FLD: 13.7 % — SIGNIFICANT CHANGE UP (ref 10.3–14.5)
SODIUM SERPL-SCNC: 136 MMOL/L — SIGNIFICANT CHANGE UP (ref 135–145)
WBC # BLD: 12.37 K/UL — HIGH (ref 3.8–10.5)
WBC # FLD AUTO: 12.37 K/UL — HIGH (ref 3.8–10.5)

## 2020-03-22 RX ORDER — LANOLIN ALCOHOL/MO/W.PET/CERES
5 CREAM (GRAM) TOPICAL AT BEDTIME
Refills: 0 | Status: DISCONTINUED | OUTPATIENT
Start: 2020-03-22 | End: 2020-03-23

## 2020-03-22 RX ADMIN — HYDROMORPHONE HYDROCHLORIDE 1 MILLIGRAM(S): 2 INJECTION INTRAMUSCULAR; INTRAVENOUS; SUBCUTANEOUS at 04:39

## 2020-03-22 RX ADMIN — Medication 650 MILLIGRAM(S): at 22:15

## 2020-03-22 RX ADMIN — HYDROMORPHONE HYDROCHLORIDE 1 MILLIGRAM(S): 2 INJECTION INTRAMUSCULAR; INTRAVENOUS; SUBCUTANEOUS at 14:16

## 2020-03-22 RX ADMIN — Medication 15 UNIT(S): at 16:40

## 2020-03-22 RX ADMIN — Medication 1 TABLET(S): at 16:40

## 2020-03-22 RX ADMIN — HEPARIN SODIUM 5000 UNIT(S): 5000 INJECTION INTRAVENOUS; SUBCUTANEOUS at 21:38

## 2020-03-22 RX ADMIN — PIPERACILLIN AND TAZOBACTAM 25 GRAM(S): 4; .5 INJECTION, POWDER, LYOPHILIZED, FOR SOLUTION INTRAVENOUS at 21:39

## 2020-03-22 RX ADMIN — Medication 5 MILLIGRAM(S): at 01:56

## 2020-03-22 RX ADMIN — Medication 2: at 12:23

## 2020-03-22 RX ADMIN — Medication 15 UNIT(S): at 09:03

## 2020-03-22 RX ADMIN — PIPERACILLIN AND TAZOBACTAM 25 GRAM(S): 4; .5 INJECTION, POWDER, LYOPHILIZED, FOR SOLUTION INTRAVENOUS at 13:12

## 2020-03-22 RX ADMIN — HYDROMORPHONE HYDROCHLORIDE 1 MILLIGRAM(S): 2 INJECTION INTRAMUSCULAR; INTRAVENOUS; SUBCUTANEOUS at 04:06

## 2020-03-22 RX ADMIN — Medication 650 MILLIGRAM(S): at 21:38

## 2020-03-22 RX ADMIN — INSULIN GLARGINE 46 UNIT(S): 100 INJECTION, SOLUTION SUBCUTANEOUS at 21:39

## 2020-03-22 RX ADMIN — HYDROMORPHONE HYDROCHLORIDE 1 MILLIGRAM(S): 2 INJECTION INTRAMUSCULAR; INTRAVENOUS; SUBCUTANEOUS at 14:50

## 2020-03-22 RX ADMIN — HEPARIN SODIUM 5000 UNIT(S): 5000 INJECTION INTRAVENOUS; SUBCUTANEOUS at 05:51

## 2020-03-22 RX ADMIN — OXYCODONE AND ACETAMINOPHEN 1 TABLET(S): 5; 325 TABLET ORAL at 09:03

## 2020-03-22 RX ADMIN — Medication 1 TABLET(S): at 09:03

## 2020-03-22 RX ADMIN — HEPARIN SODIUM 5000 UNIT(S): 5000 INJECTION INTRAVENOUS; SUBCUTANEOUS at 13:12

## 2020-03-22 RX ADMIN — PIPERACILLIN AND TAZOBACTAM 25 GRAM(S): 4; .5 INJECTION, POWDER, LYOPHILIZED, FOR SOLUTION INTRAVENOUS at 05:51

## 2020-03-22 RX ADMIN — OXYCODONE AND ACETAMINOPHEN 1 TABLET(S): 5; 325 TABLET ORAL at 09:40

## 2020-03-22 RX ADMIN — Medication 5 MILLIGRAM(S): at 21:38

## 2020-03-22 RX ADMIN — Medication 15 UNIT(S): at 12:23

## 2020-03-22 NOTE — PROGRESS NOTE ADULT - PROBLEM SELECTOR PLAN 2
s/p 4 days OR partial 5th ray amp with I&D, and s/p 2 day second OR debridement> sec to Nec fasc with soft tissue emphysema Right foot  Podiatry, ID following.   3rd OR debridement of non viable tissue with possible graft application pending infection control added on for tomorrow Monday 3/23/20  Pt NPO after midnight- Confirmed with Podiatry

## 2020-03-22 NOTE — PROGRESS NOTE ADULT - ASSESSMENT
1.	Gas Gangrene and acute osteo of right 5th toe/ foot - s/p amputation  2.	Cellulitis of right foot/ leg - improving  3.	Leukocytosis - trending down  ·	cont zosyn 3.375gms iv q8hrs    ·	going for debridement again tomorrow.

## 2020-03-22 NOTE — PROGRESS NOTE ADULT - NEGATIVE GASTROINTESTINAL SYMPTOMS
no diarrhea/no abdominal pain/no vomiting
no vomiting/no abdominal pain/no nausea
no diarrhea/no vomiting/no abdominal pain

## 2020-03-22 NOTE — PROGRESS NOTE ADULT - GASTROINTESTINAL DETAILS
nontender/no guarding/soft/no distention/no rigidity/no organomegaly/no masses palpable/bowel sounds normal
nontender/soft/bowel sounds normal/no rigidity/no organomegaly/no masses palpable/no distention/no guarding
no rigidity/no distention/soft/no masses palpable/bowel sounds normal/no guarding/no organomegaly/nontender

## 2020-03-22 NOTE — PROGRESS NOTE ADULT - RS GEN PE MLT RESP DETAILS PC
good air movement/clear to auscultation bilaterally/no rhonchi/breath sounds equal/no wheezes/no rales
no rales/good air movement/no wheezes/clear to auscultation bilaterally/no rhonchi
good air movement/no rales/breath sounds equal/clear to auscultation bilaterally/no rhonchi/no wheezes

## 2020-03-22 NOTE — PROGRESS NOTE ADULT - SKIN COMMENTS
decreased erythema and warmth of right shin
decreased redness , warmth and swelling
decreased erythema and warmth of right  leg , right foot is bandaged

## 2020-03-22 NOTE — PROGRESS NOTE ADULT - SUBJECTIVE AND OBJECTIVE BOX
59y Male    Meds:  piperacillin/tazobactam IVPB.. 3.375 Gram(s) IV Intermittent every 8 hours    Allergies    No Known Allergies    Intolerances        VITALS:  Vital Signs Last 24 Hrs  T(C): 36.6 (22 Mar 2020 14:50), Max: 37.1 (21 Mar 2020 20:28)  T(F): 97.9 (22 Mar 2020 14:50), Max: 98.7 (21 Mar 2020 20:28)  HR: 86 (22 Mar 2020 14:50) (78 - 86)  BP: 136/77 (22 Mar 2020 14:50) (121/82 - 137/87)  BP(mean): --  RR: 17 (22 Mar 2020 14:50) (16 - 17)  SpO2: 97% (22 Mar 2020 14:50) (96% - 97%)    LABS/DIAGNOSTIC TESTS:                          9.9    12.37 )-----------( 393      ( 22 Mar 2020 08:30 )             32.0         03-22    136  |  100  |  7   ----------------------------<  125<H>  3.9   |  28  |  0.81    Ca    8.4      22 Mar 2020 08:30            CULTURES: .Surgical Swab  right foot abcess  03-20 @ 15:11   No growth  --  --      .Surgical Swab  03-17 @ 10:04   Moderate Finegoldia magna "Susceptibilities not performed"  Normal skin ward isolated  --  --      .Blood  03-16 @ 22:40   No growth at 5 days.  --  --      .Blood  03-16 @ 22:39   No growth at 5 days.  --  --            RADIOLOGY:      ROS:  [  ] UNABLE TO ELICIT 59y Male who is doing well overall , he has minimal right leg pain, his right foot is dressed , he has no fevers or chills , his diarrhea has improved. He is going to go another debridement of his right foot again tomorrow and possible graft placement on Tuesday.     Meds:  piperacillin/tazobactam IVPB.. 3.375 Gram(s) IV Intermittent every 8 hours    Allergies    No Known Allergies    Intolerances        VITALS:  Vital Signs Last 24 Hrs  T(C): 36.6 (22 Mar 2020 14:50), Max: 37.1 (21 Mar 2020 20:28)  T(F): 97.9 (22 Mar 2020 14:50), Max: 98.7 (21 Mar 2020 20:28)  HR: 86 (22 Mar 2020 14:50) (78 - 86)  BP: 136/77 (22 Mar 2020 14:50) (121/82 - 137/87)  BP(mean): --  RR: 17 (22 Mar 2020 14:50) (16 - 17)  SpO2: 97% (22 Mar 2020 14:50) (96% - 97%)    LABS/DIAGNOSTIC TESTS:                          9.9    12.37 )-----------( 393      ( 22 Mar 2020 08:30 )             32.0         03-22    136  |  100  |  7   ----------------------------<  125<H>  3.9   |  28  |  0.81    Ca    8.4      22 Mar 2020 08:30            CULTURES: .Surgical Swab  right foot abscess   03-20 @ 15:11   No growth  --  --      .Surgical Swab  03-17 @ 10:04   Moderate Finegoldia magna "Susceptibilities not performed"  Normal skin ward isolated  --  --      .Blood  03-16 @ 22:40   No growth at 5 days.  --  --      .Blood  03-16 @ 22:39   No growth at 5 days.  --  --            RADIOLOGY:      ROS:  [  ] UNABLE TO ELICIT

## 2020-03-22 NOTE — PROGRESS NOTE ADULT - SUBJECTIVE AND OBJECTIVE BOX
Patient is a 59y old  Male who presents with a chief complaint of Right foot swelling (20 Mar 2020 06:36)      INTERVAL HPI/OVERNIGHT EVENTS: patient assessed prior to OR this AM, reported pain in R foot.     MEDICATIONS  (STANDING):  ergocalciferol 34882 Unit(s) Oral <User Schedule>  heparin  Injectable 5000 Unit(s) SubCutaneous every 8 hours  influenza   Vaccine 0.5 milliLiter(s) IntraMuscular once  insulin glargine Injectable (LANTUS) 46 Unit(s) SubCutaneous at bedtime  insulin lispro (HumaLOG) corrective regimen sliding scale   SubCutaneous three times a day before meals  insulin lispro (HumaLOG) corrective regimen sliding scale   SubCutaneous at bedtime  insulin lispro Injectable (HumaLOG) 15 Unit(s) SubCutaneous three times a day before meals  lactobacillus acidophilus 1 Tablet(s) Oral two times a day with meals  melatonin 5 milliGRAM(s) Oral at bedtime  piperacillin/tazobactam IVPB.. 3.375 Gram(s) IV Intermittent every 8 hours    MEDICATIONS  (PRN):  acetaminophen   Tablet .. 650 milliGRAM(s) Oral every 6 hours PRN Temp greater or equal to 38C (100.4F), Mild Pain (1 - 3)  HYDROmorphone  Injectable 1 milliGRAM(s) IV Push every 4 hours PRN Severe Pain (7 - 10)  oxycodone    5 mG/acetaminophen 325 mG 1 Tablet(s) Oral every 6 hours PRN Moderate Pain (4 - 6)      REVIEW OF SYSTEMS:  CONSTITUTIONAL: No fever, weight loss, or fatigue  RESPIRATORY: No cough, wheezing, chills or hemoptysis; No shortness of breath  CARDIOVASCULAR: No chest pain, palpitations, dizziness, or leg swelling  GASTROINTESTINAL: No abdominal or epigastric pain. No nausea, vomiting, or hematemesis; No diarrhea or constipation. No melena or hematochezia.  NEUROLOGICAL: No headaches, memory loss, loss of strength, numbness, or tremors  SKIN: wound on right foot      PHYSICAL EXAM:  GENERAL: NAD, well-groomed, well-developed, obese  HEAD:  Atraumatic, Normocephalic  CHEST/LUNG: Clear to auscultation bilaterally; No rales, rhonchi, wheezing, or rubs  HEART: Regular rate and rhythm; No murmurs, rubs, or gallops  ABDOMEN: Soft, Nontender, Nondistended; Bowel sounds present  NERVOUS SYSTEM:  Alert & Oriented X3, Good concentration; Motor Strength 5/5 B/L   EXTREMITIES:  2+ Peripheral Pulses, No clubbing, cyanosis, or edema; right foot s/p partial amputation of 5th ray, dressing c/i                                            9.9    12.37 )-----------( 393      ( 22 Mar 2020 08:30 )             32.0               136|100|7<125  3.9|28|0.81  8.4,--,--  03-22 @ 08:30            CAPILLARY BLOOD GLUCOSE    CAPILLARY BLOOD GLUCOSE      POCT Blood Glucose.: 147 mg/dL (21 Mar 2020 21:29)  POCT Blood Glucose.: 114 mg/dL (21 Mar 2020 16:30)  POCT Blood Glucose.: 233 mg/dL (21 Mar 2020 11:27)  POCT Blood Glucose.: 203 mg/dL (21 Mar 2020 08:02)    Consultant(s) Notes Reviewed:  [ x ] YES  [ ] NO

## 2020-03-22 NOTE — PROGRESS NOTE ADULT - PEDAL EDEMA TYPE
pitting/decreased swelling right
right foot and leg but less than yesterday/pitting
right leg/pitting

## 2020-03-22 NOTE — PROGRESS NOTE ADULT - GENERAL
details…
I personally performed the service described in the documentation recorded by the scribe in my presence, and it accurately and completely records my words and actions.

## 2020-03-22 NOTE — PROGRESS NOTE ADULT - SUBJECTIVE AND OBJECTIVE BOX
Podiatry interval HPI: Pt s/p 3/17/20 OR partial 5th ray amp with I&D, and s/p 3/20/20 OR debridement> sec to Nec fasc with soft tissue emphysema Right foot. Pt denies F/C/N/V/SOB. WBC trending down however still signs of infection clinically.       S : 59y year old Male seen at bedside for Right foot swelling, rednees and blister formation. Pt states that he thinks he stepped on a splinter or some small foreign body about 1 1/2 week ago. He states that at that time he had small amount of discomfort which has been getting worst since Thursday. Pt states he went to urgent care this morning and doctor asked him to go to ED. Pt denies any F/C/N/V/SOB.    PMH: Pt denies any PMH but states that his fingerstick in hospital is high  PSH:     Allergies:No Known Allergies    LABS:                        9.9    12.37 )-----------( 393      ( 22 Mar 2020 08:30 )             32.0     22 Mar 2020 08:30    136    |  100    |  7      ----------------------------<  125    3.9     |  28     |  0.81     Ca    8.4        22 Mar 2020 08:30      Vital Signs Last 24 Hrs  T(C): 36.5 (22 Mar 2020 05:21), Max: 37.1 (21 Mar 2020 20:28)  T(F): 97.7 (22 Mar 2020 05:21), Max: 98.7 (21 Mar 2020 20:28)  HR: 78 (22 Mar 2020 05:21) (78 - 85)  BP: 121/82 (22 Mar 2020 05:21) (121/82 - 137/87)  BP(mean): --  RR: 16 (22 Mar 2020 05:21) (16 - 17)  SpO2: 96% (22 Mar 2020 05:21) (96% - 97%)    O:   General: Pleasant  male NAD & AOX3.      RLE focused  ACC: dorasal foot ulcer measuring about 10.1cmx9.6cmx0.5cm, fibrogranular wound base with exposed tendons, normal borders, mild surrounding erythema, mild edema, mild serous drainage noted to bandage. Interdigital maceration with superficial ulcers, 3rd interspace mild fluctuence.  Vascular: Dorsalis Pedis and Posterior Tibial pulses 0/4. audible via doppler right foot.  Capillary re-fill time less then 3 seconds digits 1-3, less than 5 sec  4th toe.  Neuro: Protective sensation diminished to the level of the digits.      < from: VA Physiol Extremity Lower 3+ Level, BI (03.16.20 @ 17:22) >    EXAM:  US PHYSIOL LWR EXT 3+ LEV BI                            PROCEDURE DATE:  03/16/2020          INTERPRETATION:  Clinical indication: Right foot ulcer     Comparison: None    Findings: There are biphasic waveforms bilaterally, slightly dampenedat the level of the right digit. There is no abnormal segmental pressure gradient identified.    Right JESUS = 1.04  Left JESUS = 1.15    Impression: Normal ABIs. Slightly dampened waveforms at the level of the right digit.                NATASHA MARCUS M.D., ATTENDING RADIOLOGIST  This document has been electronically signed. Mar 17 2020  9:38AM    < end of copied text >    < from: MR Foot w/ IV Cont, Right (03.17.20 @ 10:55) >  EXAM:  MR FOOT IC RT                            PROCEDURE DATE:  03/17/2020          INTERPRETATION:    MRI OF THE RIGHT FOOT    CLINICAL INFORMATION: Right foot pain and swelling. Evaluate for osteomyelitis  TECHNIQUE: Multiplanar, multisequence MRI was obtained of the RIGHT FOOT. The study was performed before and after the intravenous administration of 11 ml Gadavist (4 ml discarded) .  COMPARISON: Right foot radiographs 6T March 2020.    FINDINGS:    SOFT TISSUES: Patient status post partial amputation of the fifth ray to the level of the mid diaphysis of the fifth metatarsal. There is a large postsurgical soft tissue defect along the lateral aspect of the foot with associated packing in the bandage material. No focal drainable fluid collections or abscesses are identified.  BONE MARROW: As mentioned above, patient is status post partial amputation of the fifth ray to the level of the diaphysis of the fifth metatarsal. Marrow signal is within normal limits. No abnormal edema, loss of T1 hyperintense signal, or enhancement.    MUSCLES AND TENDONS: Diffuse edema and atrophy of the imaged musculature.  SYNOVIUM/ JOINT FLUID: No large joint effusion.  CARTILAGE AND SUBCHONDRAL BONE: Articular cartilage is maintained.  LIGAMENTS AND CAPSULAR STRUCTURES: Lisfranc ligament is intact.      IMPRESSION:  1.  No MR evidence of acute osteomyelitis.  2.  Sequelae of partial amputation of the fifth ray, as described above.                KERRIE MCNEAL M.D., ATTENDING RADIOLOGIST  This document has been electronically signed. Mar 17 2020 12:08PM    < end of copied text >        A: s/p OR I&D and partial 5th ray resection Right  foot Necrotizing fascitis( 3/17)      s/p OR second debridement and abscess drainage 3/20 Right foot  Extensive ulcer to level of the tendons right foot             P:   Chart reviewed and Patient evaluated  Discussed diagnosis and treatment with patient. Pt is aware that he is at risk of loosing his right foot.  Podiatry monitoring wbc trend, erythema and leg streaking.  3rd OR debridement of non viable tissue with possible graft application pending infection control added on for tomorrow Monday 3/23/20  Pt NPO after midnight    Pt blood glucose elevated  Post op xray and MRI reviewed  Continue with IV antibiotics   JESUS reviewed  NWB to right, FWB left   Discussed importance of daily foot examinations and proper shoe gear and to importance of lower Fasting Blood Glucose levels.   Podiatry will follow while in house.  Discussed with Attending Dr Eng

## 2020-03-23 LAB
ANION GAP SERPL CALC-SCNC: 4 MMOL/L — LOW (ref 5–17)
BUN SERPL-MCNC: 7 MG/DL — SIGNIFICANT CHANGE UP (ref 7–18)
CALCIUM SERPL-MCNC: 9.2 MG/DL — SIGNIFICANT CHANGE UP (ref 8.4–10.5)
CHLORIDE SERPL-SCNC: 103 MMOL/L — SIGNIFICANT CHANGE UP (ref 96–108)
CO2 SERPL-SCNC: 32 MMOL/L — HIGH (ref 22–31)
CREAT SERPL-MCNC: 0.89 MG/DL — SIGNIFICANT CHANGE UP (ref 0.5–1.3)
GLUCOSE BLDC GLUCOMTR-MCNC: 118 MG/DL — HIGH (ref 70–99)
GLUCOSE BLDC GLUCOMTR-MCNC: 137 MG/DL — HIGH (ref 70–99)
GLUCOSE BLDC GLUCOMTR-MCNC: 159 MG/DL — HIGH (ref 70–99)
GLUCOSE BLDC GLUCOMTR-MCNC: 196 MG/DL — HIGH (ref 70–99)
GLUCOSE BLDC GLUCOMTR-MCNC: 204 MG/DL — HIGH (ref 70–99)
GLUCOSE SERPL-MCNC: 187 MG/DL — HIGH (ref 70–99)
HCT VFR BLD CALC: 35.7 % — LOW (ref 39–50)
HGB BLD-MCNC: 10.9 G/DL — LOW (ref 13–17)
MCHC RBC-ENTMCNC: 27.9 PG — SIGNIFICANT CHANGE UP (ref 27–34)
MCHC RBC-ENTMCNC: 30.5 GM/DL — LOW (ref 32–36)
MCV RBC AUTO: 91.5 FL — SIGNIFICANT CHANGE UP (ref 80–100)
NRBC # BLD: 0 /100 WBCS — SIGNIFICANT CHANGE UP (ref 0–0)
PLATELET # BLD AUTO: 497 K/UL — HIGH (ref 150–400)
POTASSIUM SERPL-MCNC: 4.3 MMOL/L — SIGNIFICANT CHANGE UP (ref 3.5–5.3)
POTASSIUM SERPL-SCNC: 4.3 MMOL/L — SIGNIFICANT CHANGE UP (ref 3.5–5.3)
RBC # BLD: 3.9 M/UL — LOW (ref 4.2–5.8)
RBC # FLD: 13.9 % — SIGNIFICANT CHANGE UP (ref 10.3–14.5)
SODIUM SERPL-SCNC: 139 MMOL/L — SIGNIFICANT CHANGE UP (ref 135–145)
SURGICAL PATHOLOGY STUDY: SIGNIFICANT CHANGE UP
WBC # BLD: 10.84 K/UL — HIGH (ref 3.8–10.5)
WBC # FLD AUTO: 10.84 K/UL — HIGH (ref 3.8–10.5)

## 2020-03-23 RX ORDER — INSULIN GLARGINE 100 [IU]/ML
46 INJECTION, SOLUTION SUBCUTANEOUS AT BEDTIME
Refills: 0 | Status: DISCONTINUED | OUTPATIENT
Start: 2020-03-23 | End: 2020-03-25

## 2020-03-23 RX ORDER — PIPERACILLIN AND TAZOBACTAM 4; .5 G/20ML; G/20ML
3.38 INJECTION, POWDER, LYOPHILIZED, FOR SOLUTION INTRAVENOUS EVERY 8 HOURS
Refills: 0 | Status: DISCONTINUED | OUTPATIENT
Start: 2020-03-23 | End: 2020-03-25

## 2020-03-23 RX ORDER — HEPARIN SODIUM 5000 [USP'U]/ML
5000 INJECTION INTRAVENOUS; SUBCUTANEOUS EVERY 8 HOURS
Refills: 0 | Status: DISCONTINUED | OUTPATIENT
Start: 2020-03-23 | End: 2020-03-25

## 2020-03-23 RX ORDER — OXYCODONE AND ACETAMINOPHEN 5; 325 MG/1; MG/1
1 TABLET ORAL EVERY 4 HOURS
Refills: 0 | Status: DISCONTINUED | OUTPATIENT
Start: 2020-03-23 | End: 2020-03-25

## 2020-03-23 RX ORDER — INSULIN LISPRO 100/ML
15 VIAL (ML) SUBCUTANEOUS
Refills: 0 | Status: DISCONTINUED | OUTPATIENT
Start: 2020-03-23 | End: 2020-03-25

## 2020-03-23 RX ORDER — ACETAMINOPHEN 500 MG
650 TABLET ORAL EVERY 6 HOURS
Refills: 0 | Status: DISCONTINUED | OUTPATIENT
Start: 2020-03-23 | End: 2020-03-25

## 2020-03-23 RX ORDER — INSULIN LISPRO 100/ML
VIAL (ML) SUBCUTANEOUS AT BEDTIME
Refills: 0 | Status: DISCONTINUED | OUTPATIENT
Start: 2020-03-23 | End: 2020-03-25

## 2020-03-23 RX ORDER — INSULIN LISPRO 100/ML
VIAL (ML) SUBCUTANEOUS
Refills: 0 | Status: DISCONTINUED | OUTPATIENT
Start: 2020-03-23 | End: 2020-03-25

## 2020-03-23 RX ORDER — ERGOCALCIFEROL 1.25 MG/1
50000 CAPSULE ORAL
Refills: 0 | Status: DISCONTINUED | OUTPATIENT
Start: 2020-03-23 | End: 2020-03-25

## 2020-03-23 RX ORDER — HYDROMORPHONE HYDROCHLORIDE 2 MG/ML
0.5 INJECTION INTRAMUSCULAR; INTRAVENOUS; SUBCUTANEOUS
Refills: 0 | Status: DISCONTINUED | OUTPATIENT
Start: 2020-03-23 | End: 2020-03-23

## 2020-03-23 RX ORDER — HYDROMORPHONE HYDROCHLORIDE 2 MG/ML
1 INJECTION INTRAMUSCULAR; INTRAVENOUS; SUBCUTANEOUS
Refills: 0 | Status: DISCONTINUED | OUTPATIENT
Start: 2020-03-23 | End: 2020-03-23

## 2020-03-23 RX ORDER — HYDROMORPHONE HYDROCHLORIDE 2 MG/ML
1 INJECTION INTRAMUSCULAR; INTRAVENOUS; SUBCUTANEOUS ONCE
Refills: 0 | Status: DISCONTINUED | OUTPATIENT
Start: 2020-03-23 | End: 2020-03-23

## 2020-03-23 RX ADMIN — Medication 2: at 12:21

## 2020-03-23 RX ADMIN — PIPERACILLIN AND TAZOBACTAM 25 GRAM(S): 4; .5 INJECTION, POWDER, LYOPHILIZED, FOR SOLUTION INTRAVENOUS at 05:28

## 2020-03-23 RX ADMIN — PIPERACILLIN AND TAZOBACTAM 25 GRAM(S): 4; .5 INJECTION, POWDER, LYOPHILIZED, FOR SOLUTION INTRAVENOUS at 22:09

## 2020-03-23 RX ADMIN — HYDROMORPHONE HYDROCHLORIDE 1 MILLIGRAM(S): 2 INJECTION INTRAMUSCULAR; INTRAVENOUS; SUBCUTANEOUS at 22:41

## 2020-03-23 RX ADMIN — Medication 2: at 08:15

## 2020-03-23 RX ADMIN — HYDROMORPHONE HYDROCHLORIDE 1 MILLIGRAM(S): 2 INJECTION INTRAMUSCULAR; INTRAVENOUS; SUBCUTANEOUS at 22:13

## 2020-03-23 RX ADMIN — INSULIN GLARGINE 46 UNIT(S): 100 INJECTION, SOLUTION SUBCUTANEOUS at 22:09

## 2020-03-23 RX ADMIN — HEPARIN SODIUM 5000 UNIT(S): 5000 INJECTION INTRAVENOUS; SUBCUTANEOUS at 22:08

## 2020-03-23 NOTE — BRIEF OPERATIVE NOTE - OPERATION/FINDINGS
see op report. necrotic tissue, some pus
See operative report  Included incision and drainage of right foot abscess, amputation of right partial 5th ray.  Left open.
See operative report. Debridement of Right foot nonviable tissue, application of Integra graft with wound vac

## 2020-03-23 NOTE — PROGRESS NOTE ADULT - PROBLEM SELECTOR PLAN 2
s/p 4 days OR partial 5th ray amp with I&D, and s/p 2 day second OR debridement> sec to Nec fasc with soft tissue emphysema Right foot  Podiatry, ID following.   3rd OR debridement of non viable tissue with possible graft application pending infection control added on for tomorrow Monday 3/23/20  Keep patient NPO.

## 2020-03-23 NOTE — BRIEF OPERATIVE NOTE - NSICDXBRIEFPOSTOP_GEN_ALL_CORE_FT
POST-OP DIAGNOSIS:  Open wound of right foot 20-Mar-2020 11:00:08  Ale Shah
POST-OP DIAGNOSIS:  Foot abscess, right 16-Mar-2020 20:30:21  Sage Crouch  Gas gangrene of foot 16-Mar-2020 20:30:01  Sage Crouch
POST-OP DIAGNOSIS:  Open wound of right foot 20-Mar-2020 11:00:08  Pablo Ale  Foot abscess, right 16-Mar-2020 20:30:21  Sage Crouch

## 2020-03-23 NOTE — CHART NOTE - NSCHARTNOTEFT_GEN_A_CORE
Pt admitted to medicine for R foot infection  and mild DKA  . Pt to OR with Podiatry 3-16-20 for ray Amputation and debridement and 3-20-20 for debridement on necritic fascitis tissue. On iv Zosyn with ID follow up.    Alert NAD  ICU Vital Signs Last 24 Hrs  T(C): 36.5 (23 Mar 2020 05:25), Max: 36.8 (22 Mar 2020 20:17)  T(F): 97.7 (23 Mar 2020 05:25), Max: 98.2 (22 Mar 2020 20:17)  HR: 81 (23 Mar 2020 05:25) (81 - 86)  BP: 160/83 (23 Mar 2020 05:25) (136/77 - 160/83)  BP(mean): --  ABP: --  ABP(mean): --  RR: 18 (23 Mar 2020 05:25) (17 - 18)  SpO2: 97% (23 Mar 2020 05:25) (93% - 97%)   Right foot: dressed, dressing s- signs of bleeding or drainage presently  Abd: soft nt nd  Lungs:cta                          10.9   10.84 )-----------( 497      ( 23 Mar 2020 08:40 )             35.7     03-23    139  |  103  |  7   ----------------------------<  187<H>  4.3   |  32<H>  |  0.89    Ca    9.2      23 Mar 2020 08:40      Culture - Surgical Swab (collected 20 Mar 2020 15:11)  Source: .Surgical Swab  right foot abcess  Preliminary Report (21 Mar 2020 12:59):    No growth         Problem/Plan - 1:  ·  Problem: Sepsis.  improved clinically on abx and post Amputation and debridement.     Problem/Plan - 2:  ·  Problem: Foot infection.  Plan: s/p 4 days OR partial 5th ray amp with I&D, and s/p 2 day second OR debridement> sec to Nec fasc with soft tissue emphysema Right foot  Podiatry, ID following.   3rd OR debridement of non viable tissue with possible graft application pending infection control added on for tomorrow Monday 3/23/20  Pt NPO      Problem/Plan - 3:  ·  Problem: KESHIA (acute kidney injury).  Plan: Cr 1.48  - likely 2/2 sepsis and dehydration   - now resolved.      Problem/Plan - 4:  ·  Problem: Diabetes mellitus.  Plan: Pt doesn't have any h/o DM nor on any meds   - Tx as above   - A1c 14.4.      Problem/Plan - 5:  ·  Problem: Need for prophylactic measure.  Plan: IMPROVE VTE Individual Risk Assessment

## 2020-03-23 NOTE — PROGRESS NOTE ADULT - SUBJECTIVE AND OBJECTIVE BOX
Patient is a 59y old  Male who presents with a chief complaint of Right foot swelling (20 Mar 2020 06:36)      INTERVAL HPI/OVERNIGHT EVENTS: patient assessed prior to OR this AM, reported pain in R foot.     T(C): 36.8 (03-23-20 @ 21:19), Max: 36.8 (03-23-20 @ 21:19)  HR: 86 (03-23-20 @ 21:19) (81 - 89)  BP: 151/82 (03-23-20 @ 21:19) (111/74 - 154/83)  RR: 18 (03-23-20 @ 21:19) (15 - 126)  SpO2: 97% (03-23-20 @ 21:19) (96% - 100%)    MEDICATIONS  (STANDING):  ergocalciferol 86784 Unit(s) Oral <User Schedule>  heparin  Injectable 5000 Unit(s) SubCutaneous every 8 hours  influenza   Vaccine 0.5 milliLiter(s) IntraMuscular once  insulin glargine Injectable (LANTUS) 46 Unit(s) SubCutaneous at bedtime  insulin lispro (HumaLOG) corrective regimen sliding scale   SubCutaneous three times a day before meals  insulin lispro (HumaLOG) corrective regimen sliding scale   SubCutaneous at bedtime  insulin lispro Injectable (HumaLOG) 15 Unit(s) SubCutaneous three times a day before meals  piperacillin/tazobactam IVPB.. 3.375 Gram(s) IV Intermittent every 8 hours    MEDICATIONS  (PRN):  acetaminophen   Tablet .. 650 milliGRAM(s) Oral every 6 hours PRN Temp greater or equal to 38C (100.4F), Mild Pain (1 - 3)  oxycodone    5 mG/acetaminophen 325 mG 1 Tablet(s) Oral every 4 hours PRN Severe Pain (7 - 10)  REVIEW OF SYSTEMS:  CONSTITUTIONAL: No fever, weight loss, or fatigue  RESPIRATORY: No cough, wheezing, chills or hemoptysis; No shortness of breath  CARDIOVASCULAR: No chest pain, palpitations, dizziness, or leg swelling  GASTROINTESTINAL: No abdominal or epigastric pain. No nausea, vomiting, or hematemesis; No diarrhea or constipation. No melena or hematochezia.  NEUROLOGICAL: No headaches, memory loss, loss of strength, numbness, or tremors  SKIN: wound on right foot      PHYSICAL EXAM:  GENERAL: NAD, well-groomed, well-developed, obese  HEAD:  Atraumatic, Normocephalic  CHEST/LUNG: Clear to auscultation bilaterally; No rales, rhonchi, wheezing, or rubs  HEART: Regular rate and rhythm; No murmurs, rubs, or gallops  ABDOMEN: Soft, Nontender, Nondistended; Bowel sounds present  NERVOUS SYSTEM:  Alert & Oriented X3, Good concentration; Motor Strength 5/5 B/L   EXTREMITIES:  2+ Peripheral Pulses, No clubbing, cyanosis, or edema; right foot s/p partial amputation of 5th ray, dressing c/i                                            9.9    12.37 )-----------( 393      ( 22 Mar 2020 08:30 )             32.0               136|100|7<125  3.9|28|0.81  8.4,--,--  03-22 @ 08:30            CAPILLARY BLOOD GLUCOSE    CAPILLARY BLOOD GLUCOSE      POCT Blood Glucose.: 147 mg/dL (21 Mar 2020 21:29)  POCT Blood Glucose.: 114 mg/dL (21 Mar 2020 16:30)  POCT Blood Glucose.: 233 mg/dL (21 Mar 2020 11:27)  POCT Blood Glucose.: 203 mg/dL (21 Mar 2020 08:02)    Consultant(s) Notes Reviewed:  [ x ] YES  [ ] NO

## 2020-03-23 NOTE — BRIEF OPERATIVE NOTE - NSICDXBRIEFPREOP_GEN_ALL_CORE_FT
PRE-OP DIAGNOSIS:  Wound of right foot 20-Mar-2020 10:59:43  Ale Shah
PRE-OP DIAGNOSIS:  Gas gangrene of foot 16-Mar-2020 20:29:51  Sage Crouch  Foot abscess, right 16-Mar-2020 20:29:41  Sage Crouch
PRE-OP DIAGNOSIS:  Wound of right foot 20-Mar-2020 10:59:43  Ale Shah  Foot abscess, right 16-Mar-2020 20:29:41  Sage Crouch

## 2020-03-23 NOTE — PROGRESS NOTE ADULT - SUBJECTIVE AND OBJECTIVE BOX
59y Male is under our care for gas gangrene and acute osteo of right 5th toe/ foot, cellulitis of right foot/ leg, and leukocytosis. Patient is doing well and has not had any new complaints. Awaiting to go for another right foot debridement with possible graft application. Remains afebrile and wbc count has normalized.     REVIEW OF SYSTEMS:  [  ] Not able to illicit  General: no fevers no malaise  Chest: no cough no sob  GI: no nvd  : no urinary sxs   Skin: no rashes  Musculoskeletal: no trauma no LBP   +tolerable foot pain   Neuro: no ha's no dizziness     MEDS:  piperacillin/tazobactam IVPB.. 3.375 Gram(s) IV Intermittent every 8 hours    ALLERGIES: Allergies    No Known Allergies    Intolerances      VITALS:  Vital Signs Last 24 Hrs  T(C): 36.5 (23 Mar 2020 05:25), Max: 36.8 (22 Mar 2020 20:17)  T(F): 97.7 (23 Mar 2020 05:25), Max: 98.2 (22 Mar 2020 20:17)  HR: 81 (23 Mar 2020 05:25) (81 - 86)  BP: 160/83 (23 Mar 2020 05:25) (136/77 - 160/83)  BP(mean): --  RR: 18 (23 Mar 2020 05:25) (17 - 18)  SpO2: 97% (23 Mar 2020 05:25) (93% - 97%)      PHYSICAL EXAM:  HEENT: n/a  Neck: supple no LN's   Respiratory: lungs clear no rales  Cardiovascular: S1 S2 reg no murmurs  Gastrointestinal: +BS with soft, nondistended abdomen; nontender  Extremities: no edema  Skin: resolved right lower leg erythema, but mild warmth lingers  +right foot wrapped  Ortho: n/a  Neuro: AAO x 3      LABS/DIAGNOSTIC TESTS:                        10.9   10.84 )-----------( 497      ( 23 Mar 2020 08:40 )             35.7     WBC Count: 10.84 K/uL (03-23 @ 08:40)  WBC Count: 12.37 K/uL (03-22 @ 08:30)  WBC Count: 14.40 K/uL (03-21 @ 06:47)  WBC Count: 16.35 K/uL (03-20 @ 07:30)  WBC Count: 19.21 K/uL (03-19 @ 07:06)    03-23    139  |  103  |  7   ----------------------------<  187<H>  4.3   |  32<H>  |  0.89    Ca    9.2      23 Mar 2020 08:40        CULTURES:   .Surgical Swab  right foot abcess  03-20 @ 15:11   No growth  --  --      .Surgical Swab  03-17 @ 10:04   Moderate Finegoldia magna "Susceptibilities not performed"  Normal skin ward isolated  --  --      .Blood  03-16 @ 22:40   No growth at 5 days.  --  --      .Blood  03-16 @ 22:39   No growth at 5 days.  --  --        RADIOLOGY:  no new studies

## 2020-03-23 NOTE — PROGRESS NOTE ADULT - ASSESSMENT
1.	Gas Gangrene and acute osteo of right 5th toe/ foot - s/p amputation  2.	Cellulitis of right foot/ leg - improving  3.	Leukocytosis - trending down  ·	cont zosyn 3.375gms iv q8hrs    ·	going for debridement with possible graft placement today

## 2020-03-23 NOTE — BRIEF OPERATIVE NOTE - NSICDXBRIEFPROCEDURE_GEN_ALL_CORE_FT
PROCEDURES:  Exploration of wound of right foot 20-Mar-2020 10:59:06  Ale Shah  Excisional debridement of ulcer of right foot 20-Mar-2020 10:58:48  Ale Shah
PROCEDURES:  Excisional debridement of ulcer of right foot 20-Mar-2020 10:58:48  Ale Shah  Incision and drainage, abscess, bone 16-Mar-2020 20:29:07  Sage Crouch
PROCEDURES:  Partial amputation of fifth ray of right foot by open approach 16-Mar-2020 20:29:17  Sage Crouch  Incision and drainage, abscess, bone 16-Mar-2020 20:29:07  Sage Crouch

## 2020-03-23 NOTE — PROGRESS NOTE ADULT - SUBJECTIVE AND OBJECTIVE BOX
Podiatry interval HPI: pre-op note. Pt. seen bedside this AM for pre-op. Patient is due for OR for debridement with graft placement and wound vac application. Patient endorses NPO status, offers no complaints. He is s/p 3/17/20 OR partial 5th ray amp with I&D, and s/p 3/20/20 OR debridement, secondary to Nec fasc with soft tissue emphysema Right foot. Pt denies F/C/N/V/SOB. WBC trending down, patient is afebrile. No physical exam today.       S : 59y year old Male seen at bedside for Right foot swelling, rednees and blister formation. Pt states that he thinks he stepped on a splinter or some small foreign body about 1 1/2 week ago. He states that at that time he had small amount of discomfort which has been getting worst since Thursday. Pt states he went to urgent care this morning and doctor asked him to go to ED. Pt denies any F/C/N/V/SOB.    PMH: Pt denies any PMH but states that his fingerstick in hospital is high  PSH:     Allergies: No Known Allergies      MEDICATIONS  (STANDING):  ergocalciferol 95628 Unit(s) Oral <User Schedule>  heparin  Injectable 5000 Unit(s) SubCutaneous every 8 hours  influenza   Vaccine 0.5 milliLiter(s) IntraMuscular once  insulin glargine Injectable (LANTUS) 46 Unit(s) SubCutaneous at bedtime  insulin lispro (HumaLOG) corrective regimen sliding scale   SubCutaneous three times a day before meals  insulin lispro (HumaLOG) corrective regimen sliding scale   SubCutaneous at bedtime  insulin lispro Injectable (HumaLOG) 15 Unit(s) SubCutaneous three times a day before meals  lactobacillus acidophilus 1 Tablet(s) Oral two times a day with meals  melatonin 5 milliGRAM(s) Oral at bedtime  piperacillin/tazobactam IVPB.. 3.375 Gram(s) IV Intermittent every 8 hours    MEDICATIONS  (PRN):  acetaminophen   Tablet .. 650 milliGRAM(s) Oral every 6 hours PRN Temp greater or equal to 38C (100.4F), Mild Pain (1 - 3)  HYDROmorphone  Injectable 1 milliGRAM(s) IV Push every 4 hours PRN Severe Pain (7 - 10)  oxycodone    5 mG/acetaminophen 325 mG 1 Tablet(s) Oral every 6 hours PRN Moderate Pain (4 - 6)      LABS:                                   10.9   10.84 )-----------( 497      ( 23 Mar 2020 08:40 )             35.7       03-23    139  |  103  |  7   ----------------------------<  187<H>  4.3   |  32<H>  |  0.89    Ca    9.2      23 Mar 2020 08:40        Vital Signs Last 24 Hrs  T(C): 36.5 (23 Mar 2020 05:25), Max: 36.8 (22 Mar 2020 20:17)  T(F): 97.7 (23 Mar 2020 05:25), Max: 98.2 (22 Mar 2020 20:17)  HR: 81 (23 Mar 2020 05:25) (81 - 86)  BP: 160/83 (23 Mar 2020 05:25) (136/77 - 160/83)  BP(mean): --  RR: 18 (23 Mar 2020 05:25) (17 - 18)  SpO2: 97% (23 Mar 2020 05:25) (93% - 97%)    Hemoglobin A1C, Whole Blood: 14.6 % (03-17-20 @ 14:48)  Hemoglobin A1C, Whole Blood: 14.4 % (03-17-20 @ 00:09)      C-Reactive Protein, Serum: 36.53 mg/dL (03-17-20 @ 00:03)    O:   General: Pleasant  male NAD & AOX3.      No physical exam today. Dressing to right foot clean, dry and intact. Previous exam as follow:  RLE focused  ACC: dorasal foot ulcer measuring about 10.1cmx9.6cmx0.5cm, fibrogranular wound base with exposed tendons, normal borders, mild surrounding erythema, mild edema, mild serous drainage noted to bandage. Interdigital maceration with superficial ulcers, 3rd interspace mild fluctuence.  Vascular: Dorsalis Pedis and Posterior Tibial pulses 0/4. audible via doppler right foot.  Capillary re-fill time less then 3 seconds digits 1-3, less than 5 sec  4th toe.  Neuro: Protective sensation diminished to the level of the digits.      < from: VA Physiol Extremity Lower 3+ Level, BI (03.16.20 @ 17:22) >    EXAM:  US PHYSIOL LWR EXT 3+ LEV BI                            PROCEDURE DATE:  03/16/2020          INTERPRETATION:  Clinical indication: Right foot ulcer     Comparison: None    Findings: There are biphasic waveforms bilaterally, slightly dampenedat the level of the right digit. There is no abnormal segmental pressure gradient identified.    Right JESUS = 1.04  Left JESUS = 1.15    Impression: Normal ABIs. Slightly dampened waveforms at the level of the right digit.                NATASHA MARCUS M.D., ATTENDING RADIOLOGIST  This document has been electronically signed. Mar 17 2020  9:38AM    < end of copied text >    < from: MR Foot w/ IV Cont, Right (03.17.20 @ 10:55) >  EXAM:  MR FOOT IC RT                            PROCEDURE DATE:  03/17/2020          INTERPRETATION:    MRI OF THE RIGHT FOOT    CLINICAL INFORMATION: Right foot pain and swelling. Evaluate for osteomyelitis  TECHNIQUE: Multiplanar, multisequence MRI was obtained of the RIGHT FOOT. The study was performed before and after the intravenous administration of 11 ml Gadavist (4 ml discarded) .  COMPARISON: Right foot radiographs 6T March 2020.    FINDINGS:    SOFT TISSUES: Patient status post partial amputation of the fifth ray to the level of the mid diaphysis of the fifth metatarsal. There is a large postsurgical soft tissue defect along the lateral aspect of the foot with associated packing in the bandage material. No focal drainable fluid collections or abscesses are identified.  BONE MARROW: As mentioned above, patient is status post partial amputation of the fifth ray to the level of the diaphysis of the fifth metatarsal. Marrow signal is within normal limits. No abnormal edema, loss of T1 hyperintense signal, or enhancement.    MUSCLES AND TENDONS: Diffuse edema and atrophy of the imaged musculature.  SYNOVIUM/ JOINT FLUID: No large joint effusion.  CARTILAGE AND SUBCHONDRAL BONE: Articular cartilage is maintained.  LIGAMENTS AND CAPSULAR STRUCTURES: Lisfranc ligament is intact.      IMPRESSION:  1.  No MR evidence of acute osteomyelitis.  2.  Sequelae of partial amputation of the fifth ray, as described above.                KERRIE MCNEAL M.D., ATTENDING RADIOLOGIST  This document has been electronically signed. Mar 17 2020 12:08PM    < end of copied text >        A: s/p OR I&D and partial 5th ray resection Right  foot Necrotizing fascitis( 3/17)      s/p OR second debridement and abscess drainage 3/20 Right foot  Extensive ulcer to level of the tendons right foot             P:   Chart reviewed and Patient evaluated  Discussed diagnosis and treatment with patient. Pt is aware that he is at risk of loosing his right foot.  No physical exam today  Pt is NPO for OR this PM, ~1PM  Medical clearance previously documented  Written consent in Chart.   Previous xray and MRI reviewed  Continue with IV antibiotics   JESUS reviewed  NWB to right, FWB left   Podiatry will follow while in house.  Patient will need to be DC'd with home wound VAC.   Discussed with Attending Dr Eng

## 2020-03-24 LAB
GLUCOSE BLDC GLUCOMTR-MCNC: 124 MG/DL — HIGH (ref 70–99)
GLUCOSE BLDC GLUCOMTR-MCNC: 160 MG/DL — HIGH (ref 70–99)
GLUCOSE BLDC GLUCOMTR-MCNC: 172 MG/DL — HIGH (ref 70–99)
GLUCOSE BLDC GLUCOMTR-MCNC: 224 MG/DL — HIGH (ref 70–99)

## 2020-03-24 RX ORDER — ERTAPENEM SODIUM 1 G/1
1 INJECTION, POWDER, LYOPHILIZED, FOR SOLUTION INTRAMUSCULAR; INTRAVENOUS
Qty: 28 | Refills: 0
Start: 2020-03-24 | End: 2020-04-20

## 2020-03-24 RX ORDER — ISOPROPYL ALCOHOL, BENZOCAINE .7; .06 ML/ML; ML/ML
1 SWAB TOPICAL
Qty: 100 | Refills: 1
Start: 2020-03-24 | End: 2020-05-12

## 2020-03-24 RX ORDER — LANOLIN ALCOHOL/MO/W.PET/CERES
3 CREAM (GRAM) TOPICAL AT BEDTIME
Refills: 0 | Status: DISCONTINUED | OUTPATIENT
Start: 2020-03-24 | End: 2020-03-25

## 2020-03-24 RX ADMIN — Medication 15 UNIT(S): at 12:10

## 2020-03-24 RX ADMIN — HEPARIN SODIUM 5000 UNIT(S): 5000 INJECTION INTRAVENOUS; SUBCUTANEOUS at 14:42

## 2020-03-24 RX ADMIN — Medication 15 UNIT(S): at 17:11

## 2020-03-24 RX ADMIN — Medication 15 UNIT(S): at 08:04

## 2020-03-24 RX ADMIN — PIPERACILLIN AND TAZOBACTAM 25 GRAM(S): 4; .5 INJECTION, POWDER, LYOPHILIZED, FOR SOLUTION INTRAVENOUS at 14:42

## 2020-03-24 RX ADMIN — Medication 650 MILLIGRAM(S): at 08:55

## 2020-03-24 RX ADMIN — Medication 650 MILLIGRAM(S): at 07:55

## 2020-03-24 RX ADMIN — HEPARIN SODIUM 5000 UNIT(S): 5000 INJECTION INTRAVENOUS; SUBCUTANEOUS at 05:35

## 2020-03-24 RX ADMIN — Medication 4: at 17:11

## 2020-03-24 RX ADMIN — OXYCODONE AND ACETAMINOPHEN 1 TABLET(S): 5; 325 TABLET ORAL at 22:43

## 2020-03-24 RX ADMIN — PIPERACILLIN AND TAZOBACTAM 25 GRAM(S): 4; .5 INJECTION, POWDER, LYOPHILIZED, FOR SOLUTION INTRAVENOUS at 22:06

## 2020-03-24 RX ADMIN — INSULIN GLARGINE 46 UNIT(S): 100 INJECTION, SOLUTION SUBCUTANEOUS at 22:06

## 2020-03-24 RX ADMIN — HEPARIN SODIUM 5000 UNIT(S): 5000 INJECTION INTRAVENOUS; SUBCUTANEOUS at 22:06

## 2020-03-24 RX ADMIN — PIPERACILLIN AND TAZOBACTAM 25 GRAM(S): 4; .5 INJECTION, POWDER, LYOPHILIZED, FOR SOLUTION INTRAVENOUS at 05:35

## 2020-03-24 RX ADMIN — Medication 2: at 12:09

## 2020-03-24 RX ADMIN — OXYCODONE AND ACETAMINOPHEN 1 TABLET(S): 5; 325 TABLET ORAL at 22:13

## 2020-03-24 NOTE — PROGRESS NOTE ADULT - SUBJECTIVE AND OBJECTIVE BOX
Patient is a 59y old  Male who presents with a chief complaint of Right foot swelling (20 Mar 2020 06:36)      INTERVAL HPI/OVERNIGHT EVENTS: patient with no new complaints, no events over night.         T(C): 36.8 (03-23-20 @ 21:19), Max: 36.8 (03-23-20 @ 21:19)  HR: 86 (03-23-20 @ 21:19) (81 - 89)  BP: 151/82 (03-23-20 @ 21:19) (111/74 - 154/83)  RR: 18 (03-23-20 @ 21:19) (15 - 126)  SpO2: 97% (03-23-20 @ 21:19) (96% - 100%)    MEDICATIONS  (STANDING):  ergocalciferol 03168 Unit(s) Oral <User Schedule>  heparin  Injectable 5000 Unit(s) SubCutaneous every 8 hours  influenza   Vaccine 0.5 milliLiter(s) IntraMuscular once  insulin glargine Injectable (LANTUS) 46 Unit(s) SubCutaneous at bedtime  insulin lispro (HumaLOG) corrective regimen sliding scale   SubCutaneous three times a day before meals  insulin lispro (HumaLOG) corrective regimen sliding scale   SubCutaneous at bedtime  insulin lispro Injectable (HumaLOG) 15 Unit(s) SubCutaneous three times a day before meals  piperacillin/tazobactam IVPB.. 3.375 Gram(s) IV Intermittent every 8 hours    MEDICATIONS  (PRN):  acetaminophen   Tablet .. 650 milliGRAM(s) Oral every 6 hours PRN Temp greater or equal to 38C (100.4F), Mild Pain (1 - 3)  oxycodone    5 mG/acetaminophen 325 mG 1 Tablet(s) Oral every 4 hours PRN Severe Pain (7 - 10)  REVIEW OF SYSTEMS:  CONSTITUTIONAL: No fever, weight loss, or fatigue  RESPIRATORY: No cough, wheezing, chills or hemoptysis; No shortness of breath  CARDIOVASCULAR: No chest pain, palpitations, dizziness, or leg swelling  GASTROINTESTINAL: No abdominal or epigastric pain. No nausea, vomiting, or hematemesis; No diarrhea or constipation. No melena or hematochezia.  NEUROLOGICAL: No headaches, memory loss, loss of strength, numbness, or tremors  SKIN: wound on right foot      PHYSICAL EXAM:  GENERAL: NAD, well-groomed, well-developed, obese  HEAD:  Atraumatic, Normocephalic  CHEST/LUNG: Clear to auscultation bilaterally; No rales, rhonchi, wheezing, or rubs  HEART: Regular rate and rhythm; No murmurs, rubs, or gallops  ABDOMEN: Soft, Nontender, Nondistended; Bowel sounds present  NERVOUS SYSTEM:  Alert & Oriented X3, Good concentration; Motor Strength 5/5 B/L   EXTREMITIES:  2+ Peripheral Pulses, No clubbing, cyanosis, or edema; right foot s/p partial amputation of 5th ray, dressing c/i                                           10.9   10.84 )-----------( 497      ( 23 Mar 2020 08:40 )             35.7                     CAPILLARY BLOOD GLUCOSE    CAPILLARY BLOOD GLUCOSE      POCT Blood Glucose.: 147 mg/dL (21 Mar 2020 21:29)  POCT Blood Glucose.: 114 mg/dL (21 Mar 2020 16:30)  POCT Blood Glucose.: 233 mg/dL (21 Mar 2020 11:27)  POCT Blood Glucose.: 203 mg/dL (21 Mar 2020 08:02)    Consultant(s) Notes Reviewed:  [ x ] YES  [ ] NO

## 2020-03-24 NOTE — PROGRESS NOTE ADULT - PROBLEM SELECTOR PLAN 2
s/p 4 days OR partial 5th ray amp with I&D, and s/p 2 day second OR debridement> sec to Nec fasc with soft tissue emphysema Right foot  Podiatry, ID following.   3rd OR debridement of non viable tissue, d/c planning

## 2020-03-24 NOTE — PROGRESS NOTE ADULT - SUBJECTIVE AND OBJECTIVE BOX
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 Underwent right foot debridement yesterday with graft placement and wound vac application. Patient is doing well and admits foot pain is tolerable. About to go to IR for extended dwell cath. Will need arrangements for home infusion services and wound vac maintenance.       REVIEW OF SYSTEMS:  [  ] Not able to illicit  General: no fevers no malaise  Chest: no cough no sob  GI: no nvd  Skin: no rashes  Musculoskeletal: no trauma no LBP   +tolerable foot pain   Neuro: no ha's no dizziness     MEDS:  piperacillin/tazobactam IVPB.. 3.375 Gram(s) IV Intermittent every 8 hours    ALLERGIES: Allergies    No Known Allergies    Intolerances      VITALS:  Vital Signs Last 24 Hrs  T(C): 36.7 (24 Mar 2020 12:45), Max: 36.8 (23 Mar 2020 21:19)  T(F): 98 (24 Mar 2020 12:45), Max: 98.3 (23 Mar 2020 21:19)  HR: 83 (24 Mar 2020 12:45) (81 - 86)  BP: 135/88 (24 Mar 2020 12:45) (111/74 - 151/83)  BP(mean): 112 (23 Mar 2020 16:15) (86 - 112)  RR: 18 (24 Mar 2020 12:45) (15 - 24)  SpO2: 95% (24 Mar 2020 12:45) (95% - 100%)      PHYSICAL EXAM:  HEENT: n/a  Neck: supple no LN's   Respiratory: lungs clear no rales  Cardiovascular: S1 S2 reg no murmurs  Gastrointestinal: +BS with soft, nondistended abdomen; nontender  Extremities: no edema  Skin: resolved right lower leg cellulitis +right foot wrapped and secured with wound vac  Ortho: n/a  Neuro: awake and alert      LABS/DIAGNOSTIC TESTS:   No new labs            CULTURES:   .Surgical Swab  right foot abcess  03-20 @ 15:11   No growth  --  --      .Surgical Swab  03-17 @ 10:04   Moderate Finegoldia magna "Susceptibilities not performed"  Normal skin ward isolated  --  --      .Blood  03-16 @ 22:40   No growth at 5 days.  --  --      .Blood  03-16 @ 22:39   No growth at 5 days.  --  --        RADIOLOGY:  no new studies

## 2020-03-24 NOTE — PROGRESS NOTE ADULT - SUBJECTIVE AND OBJECTIVE BOX
6cm 20 Gauge Extended Dwell Catheter inserted via the left basilic vein.  Good blood flow, dressing applied.

## 2020-03-24 NOTE — PROGRESS NOTE ADULT - SUBJECTIVE AND OBJECTIVE BOX
Interval Events:      Allergies    No Known Allergies    Intolerances      Endocrine/Metabolic Medications:  insulin glargine Injectable (LANTUS) 46 Unit(s) SubCutaneous at bedtime  insulin lispro (HumaLOG) corrective regimen sliding scale   SubCutaneous three times a day before meals  insulin lispro (HumaLOG) corrective regimen sliding scale   SubCutaneous at bedtime  insulin lispro Injectable (HumaLOG) 15 Unit(s) SubCutaneous three times a day before meals      Vital Signs Last 24 Hrs  T(C): 36.3 (24 Mar 2020 05:20), Max: 36.8 (23 Mar 2020 21:19)  T(F): 97.3 (24 Mar 2020 05:20), Max: 98.3 (23 Mar 2020 21:19)  HR: 84 (24 Mar 2020 05:20) (81 - 89)  BP: 151/83 (24 Mar 2020 05:20) (111/74 - 154/83)  BP(mean): 112 (23 Mar 2020 16:15) (86 - 112)  RR: 18 (24 Mar 2020 05:20) (15 - 126)  SpO2: 95% (24 Mar 2020 05:20) (95% - 100%)      PHYSICAL EXAM  All physical exam findings normal, except those marked:  General:	Alert, active, cooperative, NAD, well hydrated  .		[] Abnormal:  Neck		Normal: supple, no cervical adenopathy, no palpable thyroid  .		[] Abnormal:  Cardiovascular	Normal: regular rate, normal S1, S2, no murmurs  .		[] Abnormal:  Respiratory	Normal: no chest wall deformity, normal respiratory pattern, CTA B/L  .		[] Abnormal:  Abdominal	Normal: soft, ND, NT, bowel sounds present, no masses, no organomegaly  .		[] Abnormal:  		Normal normal genitalia, testes descended, circumcised/uncircumcised  .		Mer stage:			Breast mer:  .		Menstrual history:  .		[] Abnormal:  Extremities	Normal: FROM x4  .		[] Abnormal:  Skin		Normal: intact and not indurated, no rash, no acanthosis nigricans  .		[] Abnormal:  Neurologic	Normal: grossly intact  .		[] Abnormal:    LABS        CAPILLARY BLOOD GLUCOSE      POCT Blood Glucose.: 124 mg/dL (24 Mar 2020 07:46)  POCT Blood Glucose.: 204 mg/dL (23 Mar 2020 21:17)  POCT Blood Glucose.: 118 mg/dL (23 Mar 2020 16:41)  POCT Blood Glucose.: 137 mg/dL (23 Mar 2020 15:27)  POCT Blood Glucose.: 159 mg/dL (23 Mar 2020 11:47)        Assesment/plan Interval Events:  pt in nad      Allergies    No Known Allergies    Intolerances      Endocrine/Metabolic Medications:  insulin glargine Injectable (LANTUS) 46 Unit(s) SubCutaneous at bedtime  insulin lispro (HumaLOG) corrective regimen sliding scale   SubCutaneous three times a day before meals  insulin lispro (HumaLOG) corrective regimen sliding scale   SubCutaneous at bedtime  insulin lispro Injectable (HumaLOG) 15 Unit(s) SubCutaneous three times a day before meals      Vital Signs Last 24 Hrs  T(C): 36.3 (24 Mar 2020 05:20), Max: 36.8 (23 Mar 2020 21:19)  T(F): 97.3 (24 Mar 2020 05:20), Max: 98.3 (23 Mar 2020 21:19)  HR: 84 (24 Mar 2020 05:20) (81 - 89)  BP: 151/83 (24 Mar 2020 05:20) (111/74 - 154/83)  BP(mean): 112 (23 Mar 2020 16:15) (86 - 112)  RR: 18 (24 Mar 2020 05:20) (15 - 126)  SpO2: 95% (24 Mar 2020 05:20) (95% - 100%)      PHYSICAL EXAM  All physical exam findings normal, except those marked:  General:	Alert, active, cooperative, NAD, well hydrated  .		[] Abnormal:  Neck		Normal: supple, no cervical adenopathy, no palpable thyroid  .		[] Abnormal:  Cardiovascular	Normal: regular rate, normal S1, S2, no murmurs  .		[] Abnormal:  Respiratory	Normal: no chest wall deformity, normal respiratory pattern, CTA B/L  .		[] Abnormal:  Abdominal	Normal: soft, ND, NT, bowel sounds present, no masses, no organomegaly  .		[] Abnormal:  		Normal normal genitalia, testes descended, circumcised/uncircumcised  .		Mer stage:			Breast mer:  .		Menstrual history:  .		[] Abnormal:  Extremities	Normal: FROM x4  .		[] Abnormal:  Skin		Normal: intact and not indurated, no rash, no acanthosis nigricans  .		[] Abnormal:  Neurologic	Normal: grossly intact  .		[] Abnormal:    LABS        CAPILLARY BLOOD GLUCOSE      POCT Blood Glucose.: 124 mg/dL (24 Mar 2020 07:46)  POCT Blood Glucose.: 204 mg/dL (23 Mar 2020 21:17)  POCT Blood Glucose.: 118 mg/dL (23 Mar 2020 16:41)  POCT Blood Glucose.: 137 mg/dL (23 Mar 2020 15:27)  POCT Blood Glucose.: 159 mg/dL (23 Mar 2020 11:47)        Assesment/plan    Uncontrolled diabetes mellitus:  new onset with severe hyperglycemia  now better controlled  cont lantus to 46 units  cont huamlog 15 ac tid  Moderate Sliding scale   start metformin 500 BID as outpatient upon discharge   fsg ac and hs  nutrition eval  dm teaching  d/w hs.    Sepsis: due to foot infection  s/p debridement  cont iv abx  f/u id and podiatry recs.

## 2020-03-24 NOTE — PROGRESS NOTE ADULT - ASSESSMENT
1.	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	Cellulitis of right foot/ leg - resolved  3.	S/p leukocytosis   ·	cont zosyn 3.375gms iv q8hrs for now  ·	about to go for extended dwell cath placement  ·	will need arrangements for home infusion and wound vac maintenance  ·	upon discharge, can be switched to invanz 1gm IV daily for 28 days

## 2020-03-24 NOTE — PROGRESS NOTE ADULT - SUBJECTIVE AND OBJECTIVE BOX
Podiatry interval HPI: Patient is s/p Right foot debridement with Integra graft and Negative Pressure wound therapy placement. Patient reported some pain to the right foot immediately post-op.  patient is afebrile, no new labs this AM. No physical exam today.       S : 59y year old Male seen at bedside for Right foot swelling, rednees and blister formation. Pt states that he thinks he stepped on a splinter or some small foreign body about 1 1/2 week ago. He states that at that time he had small amount of discomfort which has been getting worst since Thursday. Pt states he went to urgent care this morning and doctor asked him to go to ED. Pt denies any F/C/N/V/SOB.    PMH: Pt denies any PMH but states that his fingerstick in hospital is high  PSH:     Allergies: No Known Allergies      MEDICATIONS  (STANDING):  ergocalciferol 65121 Unit(s) Oral <User Schedule>  heparin  Injectable 5000 Unit(s) SubCutaneous every 8 hours  influenza   Vaccine 0.5 milliLiter(s) IntraMuscular once  insulin glargine Injectable (LANTUS) 46 Unit(s) SubCutaneous at bedtime  insulin lispro (HumaLOG) corrective regimen sliding scale   SubCutaneous three times a day before meals  insulin lispro (HumaLOG) corrective regimen sliding scale   SubCutaneous at bedtime  insulin lispro Injectable (HumaLOG) 15 Unit(s) SubCutaneous three times a day before meals  piperacillin/tazobactam IVPB.. 3.375 Gram(s) IV Intermittent every 8 hours    MEDICATIONS  (PRN):  acetaminophen   Tablet .. 650 milliGRAM(s) Oral every 6 hours PRN Temp greater or equal to 38C (100.4F), Mild Pain (1 - 3)  oxycodone    5 mG/acetaminophen 325 mG 1 Tablet(s) Oral every 4 hours PRN Severe Pain (7 - 10)        LABS:                        10.9   10.84 )-----------( 497      ( 23 Mar 2020 08:40 )             35.7       03-23    139  |  103  |  7   ----------------------------<  187<H>  4.3   |  32<H>  |  0.89    Ca    9.2      23 Mar 2020 08:40        Vital Signs Last 24 Hrs  T(C): 36.3 (24 Mar 2020 05:20), Max: 36.8 (23 Mar 2020 21:19)  T(F): 97.3 (24 Mar 2020 05:20), Max: 98.3 (23 Mar 2020 21:19)  HR: 84 (24 Mar 2020 05:20) (81 - 89)  BP: 151/83 (24 Mar 2020 05:20) (111/74 - 154/83)  BP(mean): 112 (23 Mar 2020 16:15) (86 - 112)  RR: 18 (24 Mar 2020 05:20) (15 - 126)  SpO2: 95% (24 Mar 2020 05:20) (95% - 100%)    Hemoglobin A1C, Whole Blood: 14.6 % (03-17-20 @ 14:48)  Hemoglobin A1C, Whole Blood: 14.4 % (03-17-20 @ 00:09)      C-Reactive Protein, Serum: 36.53 mg/dL (03-17-20 @ 00:03)                             O:   General: Pleasant  male NAD & AOX3.      No physical exam today. Dressing to right foot clean, dry and intact. Previous exam as follow:  RLE focused  ACC: dorasal foot ulcer measuring about 10.1cmx9.6cmx0.5cm, fibrogranular wound base with exposed tendons, normal borders, mild surrounding erythema, mild edema, mild serous drainage noted to bandage. Interdigital maceration with superficial ulcers, 3rd interspace mild fluctuence.  Vascular: Dorsalis Pedis and Posterior Tibial pulses 0/4. audible via doppler right foot.  Capillary re-fill time less then 3 seconds digits 1-3, less than 5 sec  4th toe.  Neuro: Protective sensation diminished to the level of the digits.      < from: VA Physiol Extremity Lower 3+ Level, BI (03.16.20 @ 17:22) >    EXAM:  US PHYSIOL LWR EXT 3+ LEV BI                            PROCEDURE DATE:  03/16/2020          INTERPRETATION:  Clinical indication: Right foot ulcer     Comparison: None    Findings: There are biphasic waveforms bilaterally, slightly dampenedat the level of the right digit. There is no abnormal segmental pressure gradient identified.    Right JESUS = 1.04  Left JESUS = 1.15    Impression: Normal ABIs. Slightly dampened waveforms at the level of the right digit.                NATASHA MARCUS M.D., ATTENDING RADIOLOGIST  This document has been electronically signed. Mar 17 2020  9:38AM    < end of copied text >    < from: MR Foot w/ IV Cont, Right (03.17.20 @ 10:55) >  EXAM:  MR FOOT IC RT                            PROCEDURE DATE:  03/17/2020          INTERPRETATION:    MRI OF THE RIGHT FOOT    CLINICAL INFORMATION: Right foot pain and swelling. Evaluate for osteomyelitis  TECHNIQUE: Multiplanar, multisequence MRI was obtained of the RIGHT FOOT. The study was performed before and after the intravenous administration of 11 ml Gadavist (4 ml discarded) .  COMPARISON: Right foot radiographs 6T March 2020.    FINDINGS:    SOFT TISSUES: Patient status post partial amputation of the fifth ray to the level of the mid diaphysis of the fifth metatarsal. There is a large postsurgical soft tissue defect along the lateral aspect of the foot with associated packing in the bandage material. No focal drainable fluid collections or abscesses are identified.  BONE MARROW: As mentioned above, patient is status post partial amputation of the fifth ray to the level of the diaphysis of the fifth metatarsal. Marrow signal is within normal limits. No abnormal edema, loss of T1 hyperintense signal, or enhancement.    MUSCLES AND TENDONS: Diffuse edema and atrophy of the imaged musculature.  SYNOVIUM/ JOINT FLUID: No large joint effusion.  CARTILAGE AND SUBCHONDRAL BONE: Articular cartilage is maintained.  LIGAMENTS AND CAPSULAR STRUCTURES: Lisfranc ligament is intact.      IMPRESSION:  1.  No MR evidence of acute osteomyelitis.  2.  Sequelae of partial amputation of the fifth ray, as described above.                KERRIE MCNEAL M.D., ATTENDING RADIOLOGIST  This document has been electronically signed. Mar 17 2020 12:08PM    < end of copied text >        A: s/p OR I&D and partial 5th ray resection Right  foot Necrotizing fascitis( 3/17)      s/p OR second debridement and abscess drainage 3/20 Right foot  Extensive ulcer to level of the tendons right foot             P:   Chart reviewed and Patient evaluated  Discussed diagnosis and treatment with patient. Pt is aware that he is at risk of loosing his right foot.  Previous xray and MRI reviewed  Continue with IV antibiotics per ID  JESUS reviewed  Dressing to be left intact  NWB to right, FWB left   Intra-op small amount of purulence expressed at sulcus of toes, washed out with copious irrigation  Patient would benefit with long term IV antibiotics. In need of PICC line  Patient will need to be DC'd with home wound VAC and VNS.   Seen with Attending Dr Srivastava Podiatry interval HPI: Patient is s/p Right foot debridement with Integra graft and Negative Pressure wound therapy placement 3/23.  Patient reported some pain to the right foot immediately post-op, relates doing better today. Pain properly managed with Tylenol today. Patient is afebrile, no new labs this AM. No physical exam today, wound vac working properly in the correct setting, and seal is checked. Patient is stable for discharge once home vac and extended home IV antibiotic is in place.       S : 59y year old Male seen at bedside for Right foot swelling, rednees and blister formation. Pt states that he thinks he stepped on a splinter or some small foreign body about 1 1/2 week ago. He states that at that time he had small amount of discomfort which has been getting worst since Thursday. Pt states he went to urgent care this morning and doctor asked him to go to ED. Pt denies any F/C/N/V/SOB.    PMH: Pt denies any PMH but states that his fingerstick in hospital is high  PSH:     Allergies: No Known Allergies      MEDICATIONS  (STANDING):  ergocalciferol 56111 Unit(s) Oral <User Schedule>  heparin  Injectable 5000 Unit(s) SubCutaneous every 8 hours  influenza   Vaccine 0.5 milliLiter(s) IntraMuscular once  insulin glargine Injectable (LANTUS) 46 Unit(s) SubCutaneous at bedtime  insulin lispro (HumaLOG) corrective regimen sliding scale   SubCutaneous three times a day before meals  insulin lispro (HumaLOG) corrective regimen sliding scale   SubCutaneous at bedtime  insulin lispro Injectable (HumaLOG) 15 Unit(s) SubCutaneous three times a day before meals  piperacillin/tazobactam IVPB.. 3.375 Gram(s) IV Intermittent every 8 hours    MEDICATIONS  (PRN):  acetaminophen   Tablet .. 650 milliGRAM(s) Oral every 6 hours PRN Temp greater or equal to 38C (100.4F), Mild Pain (1 - 3)  oxycodone    5 mG/acetaminophen 325 mG 1 Tablet(s) Oral every 4 hours PRN Severe Pain (7 - 10)        LABS:                        10.9   10.84 )-----------( 497      ( 23 Mar 2020 08:40 )             35.7       03-23    139  |  103  |  7   ----------------------------<  187<H>  4.3   |  32<H>  |  0.89    Ca    9.2      23 Mar 2020 08:40        Vital Signs Last 24 Hrs  T(C): 36.3 (24 Mar 2020 05:20), Max: 36.8 (23 Mar 2020 21:19)  T(F): 97.3 (24 Mar 2020 05:20), Max: 98.3 (23 Mar 2020 21:19)  HR: 84 (24 Mar 2020 05:20) (81 - 89)  BP: 151/83 (24 Mar 2020 05:20) (111/74 - 154/83)  BP(mean): 112 (23 Mar 2020 16:15) (86 - 112)  RR: 18 (24 Mar 2020 05:20) (15 - 126)  SpO2: 95% (24 Mar 2020 05:20) (95% - 100%)    Hemoglobin A1C, Whole Blood: 14.6 % (03-17-20 @ 14:48)  Hemoglobin A1C, Whole Blood: 14.4 % (03-17-20 @ 00:09)      C-Reactive Protein, Serum: 36.53 mg/dL (03-17-20 @ 00:03)                             O:   General: Pleasant  male NAD & AOX3.      No physical exam today. Dressing to right foot clean, dry and intact with wound vac attached and functioning properly.     Previous exam as follow:  RLE focused  ACC: dorasal foot ulcer measuring about 10.1cmx9.6cmx0.5cm, fibrogranular wound base with exposed tendons, normal borders, mild surrounding erythema, mild edema, mild serous drainage noted to bandage. Interdigital maceration with superficial ulcers, 3rd interspace mild fluctuence.  Vascular: Dorsalis Pedis and Posterior Tibial pulses 0/4. audible via doppler right foot.  Capillary re-fill time less then 3 seconds digits 1-3, less than 5 sec  4th toe.  Neuro: Protective sensation diminished to the level of the digits.      < from: VA Physiol Extremity Lower 3+ Level, BI (03.16.20 @ 17:22) >    EXAM:  US PHYSIOL LWR EXT 3+ LEV BI                            PROCEDURE DATE:  03/16/2020          INTERPRETATION:  Clinical indication: Right foot ulcer     Comparison: None    Findings: There are biphasic waveforms bilaterally, slightly dampenedat the level of the right digit. There is no abnormal segmental pressure gradient identified.    Right JESUS = 1.04  Left JESUS = 1.15    Impression: Normal ABIs. Slightly dampened waveforms at the level of the right digit.                NATASHA MARCUS M.D., ATTENDING RADIOLOGIST  This document has been electronically signed. Mar 17 2020  9:38AM    < end of copied text >    < from: MR Foot w/ IV Cont, Right (03.17.20 @ 10:55) >  EXAM:  MR FOOT IC RT                            PROCEDURE DATE:  03/17/2020          INTERPRETATION:    MRI OF THE RIGHT FOOT    CLINICAL INFORMATION: Right foot pain and swelling. Evaluate for osteomyelitis  TECHNIQUE: Multiplanar, multisequence MRI was obtained of the RIGHT FOOT. The study was performed before and after the intravenous administration of 11 ml Gadavist (4 ml discarded) .  COMPARISON: Right foot radiographs 6T March 2020.    FINDINGS:    SOFT TISSUES: Patient status post partial amputation of the fifth ray to the level of the mid diaphysis of the fifth metatarsal. There is a large postsurgical soft tissue defect along the lateral aspect of the foot with associated packing in the bandage material. No focal drainable fluid collections or abscesses are identified.  BONE MARROW: As mentioned above, patient is status post partial amputation of the fifth ray to the level of the diaphysis of the fifth metatarsal. Marrow signal is within normal limits. No abnormal edema, loss of T1 hyperintense signal, or enhancement.    MUSCLES AND TENDONS: Diffuse edema and atrophy of the imaged musculature.  SYNOVIUM/ JOINT FLUID: No large joint effusion.  CARTILAGE AND SUBCHONDRAL BONE: Articular cartilage is maintained.  LIGAMENTS AND CAPSULAR STRUCTURES: Lisfranc ligament is intact.      IMPRESSION:  1.  No MR evidence of acute osteomyelitis.  2.  Sequelae of partial amputation of the fifth ray, as described above.                KERRIE MCNEAL M.D., ATTENDING RADIOLOGIST  This document has been electronically signed. Mar 17 2020 12:08PM    < end of copied text >        A: s/p OR I&D and partial 5th ray resection Right  foot Necrotizing fascitis( 3/17)      s/p OR second debridement and abscess drainage 3/20 Right foot  Extensive ulcer to level of the tendons right foot         P:   Chart reviewed and Patient evaluated  Discussed diagnosis and treatment with patient. Pt is aware that he is at risk of loosing his right foot.  Previous xray and MRI reviewed  Continue with IV antibiotics per ID  JESUS reviewed  Dressing to be left intact, with wound vac. VAc change thursday if patient still in house.   NWB to right, FWB left   Intra-op small amount of purulence expressed at sulcus of toes, washed out with copious irrigation  Patient would benefit with long term IV antibiotics. Spoke to Dr. Mcadams, possible extended dwell  Patient will need to be DC'd with home wound VAC and VNS.   Seen with Attending Dr Srivastava.

## 2020-03-24 NOTE — CHART NOTE - NSCHARTNOTEFT_GEN_A_CORE
pt s- complaints.  s/p debridement Right foot 5th digit ray amp, s/p debridement 3-20 and 3-23, now with wound vac.  Pt plan for Discharge to home with vns, and iv abx    ICU Vital Signs Last 24 Hrs  T(C): 36.3 (24 Mar 2020 05:20), Max: 36.8 (23 Mar 2020 21:19)  T(F): 97.3 (24 Mar 2020 05:20), Max: 98.3 (23 Mar 2020 21:19)  HR: 84 (24 Mar 2020 05:20) (81 - 89)  BP: 151/83 (24 Mar 2020 05:20) (111/74 - 154/83)  BP(mean): 112 (23 Mar 2020 16:15) (86 - 112)  ABP: --  ABP(mean): --  RR: 18 (24 Mar 2020 05:20) (15 - 126)  SpO2: 95% (24 Mar 2020 05:20) (95% - 100%)    Right foot: vac in place; no evidence of active bleed.                           10.9   10.84 )-----------( 497      ( 23 Mar 2020 08:40 )             35.7     03-23    139  |  103  |  7   ----------------------------<  187<H>  4.3   |  32<H>  |  0.89    Ca    9.2      23 Mar 2020 08:40    CArdiac  s1 s2 RRR  lungs: cta      Right foot nec fasc, s/p toe amp and multiple debridements  wound vac in place.    Plan for Extended dwell cath  plan for d/c to home with vns for vac care and IV abx invance 1gm daily for 4weeks  Will contact Dr Eugene for approval of catheter

## 2020-03-25 ENCOUNTER — TRANSCRIPTION ENCOUNTER (OUTPATIENT)
Age: 60
End: 2020-03-25

## 2020-03-25 VITALS
TEMPERATURE: 98 F | SYSTOLIC BLOOD PRESSURE: 138 MMHG | HEART RATE: 78 BPM | RESPIRATION RATE: 18 BRPM | DIASTOLIC BLOOD PRESSURE: 81 MMHG | OXYGEN SATURATION: 97 %

## 2020-03-25 LAB
CULTURE RESULTS: SIGNIFICANT CHANGE UP
GLUCOSE BLDC GLUCOMTR-MCNC: 166 MG/DL — HIGH (ref 70–99)
GLUCOSE BLDC GLUCOMTR-MCNC: 183 MG/DL — HIGH (ref 70–99)
GLUCOSE BLDC GLUCOMTR-MCNC: 191 MG/DL — HIGH (ref 70–99)
SPECIMEN SOURCE: SIGNIFICANT CHANGE UP

## 2020-03-25 PROCEDURE — 83930 ASSAY OF BLOOD OSMOLALITY: CPT

## 2020-03-25 PROCEDURE — 84100 ASSAY OF PHOSPHORUS: CPT

## 2020-03-25 PROCEDURE — 83605 ASSAY OF LACTIC ACID: CPT

## 2020-03-25 PROCEDURE — 88305 TISSUE EXAM BY PATHOLOGIST: CPT

## 2020-03-25 PROCEDURE — 88304 TISSUE EXAM BY PATHOLOGIST: CPT

## 2020-03-25 PROCEDURE — 99285 EMERGENCY DEPT VISIT HI MDM: CPT

## 2020-03-25 PROCEDURE — 83690 ASSAY OF LIPASE: CPT

## 2020-03-25 PROCEDURE — 73719 MRI LOWER EXTREMITY W/DYE: CPT

## 2020-03-25 PROCEDURE — 85652 RBC SED RATE AUTOMATED: CPT

## 2020-03-25 PROCEDURE — 87070 CULTURE OTHR SPECIMN AEROBIC: CPT

## 2020-03-25 PROCEDURE — 82607 VITAMIN B-12: CPT

## 2020-03-25 PROCEDURE — 83036 HEMOGLOBIN GLYCOSYLATED A1C: CPT

## 2020-03-25 PROCEDURE — 81001 URINALYSIS AUTO W/SCOPE: CPT

## 2020-03-25 PROCEDURE — 73630 X-RAY EXAM OF FOOT: CPT

## 2020-03-25 PROCEDURE — 85027 COMPLETE CBC AUTOMATED: CPT

## 2020-03-25 PROCEDURE — 73620 X-RAY EXAM OF FOOT: CPT

## 2020-03-25 PROCEDURE — 82962 GLUCOSE BLOOD TEST: CPT

## 2020-03-25 PROCEDURE — 80061 LIPID PANEL: CPT

## 2020-03-25 PROCEDURE — 80053 COMPREHEN METABOLIC PANEL: CPT

## 2020-03-25 PROCEDURE — 86901 BLOOD TYPING SEROLOGIC RH(D): CPT

## 2020-03-25 PROCEDURE — 84466 ASSAY OF TRANSFERRIN: CPT

## 2020-03-25 PROCEDURE — 80048 BASIC METABOLIC PNL TOTAL CA: CPT

## 2020-03-25 PROCEDURE — 86850 RBC ANTIBODY SCREEN: CPT

## 2020-03-25 PROCEDURE — 83550 IRON BINDING TEST: CPT

## 2020-03-25 PROCEDURE — 36415 COLL VENOUS BLD VENIPUNCTURE: CPT

## 2020-03-25 PROCEDURE — 99261: CPT

## 2020-03-25 PROCEDURE — 82728 ASSAY OF FERRITIN: CPT

## 2020-03-25 PROCEDURE — 83540 ASSAY OF IRON: CPT

## 2020-03-25 PROCEDURE — 97161 PT EVAL LOW COMPLEX 20 MIN: CPT

## 2020-03-25 PROCEDURE — 82009 KETONE BODYS QUAL: CPT

## 2020-03-25 PROCEDURE — 73590 X-RAY EXAM OF LOWER LEG: CPT

## 2020-03-25 PROCEDURE — 86900 BLOOD TYPING SEROLOGIC ABO: CPT

## 2020-03-25 PROCEDURE — 93923 UPR/LXTR ART STDY 3+ LVLS: CPT

## 2020-03-25 PROCEDURE — 80076 HEPATIC FUNCTION PANEL: CPT

## 2020-03-25 PROCEDURE — 87040 BLOOD CULTURE FOR BACTERIA: CPT

## 2020-03-25 PROCEDURE — 84443 ASSAY THYROID STIM HORMONE: CPT

## 2020-03-25 PROCEDURE — 93005 ELECTROCARDIOGRAM TRACING: CPT

## 2020-03-25 PROCEDURE — 86803 HEPATITIS C AB TEST: CPT

## 2020-03-25 PROCEDURE — 85730 THROMBOPLASTIN TIME PARTIAL: CPT

## 2020-03-25 PROCEDURE — 83735 ASSAY OF MAGNESIUM: CPT

## 2020-03-25 PROCEDURE — 86140 C-REACTIVE PROTEIN: CPT

## 2020-03-25 PROCEDURE — 82306 VITAMIN D 25 HYDROXY: CPT

## 2020-03-25 PROCEDURE — 87075 CULTR BACTERIA EXCEPT BLOOD: CPT

## 2020-03-25 PROCEDURE — 85610 PROTHROMBIN TIME: CPT

## 2020-03-25 RX ORDER — ENOXAPARIN SODIUM 100 MG/ML
46 INJECTION SUBCUTANEOUS
Qty: 2 | Refills: 0
Start: 2020-03-25 | End: 2020-04-23

## 2020-03-25 RX ORDER — METFORMIN HYDROCHLORIDE 850 MG/1
1 TABLET ORAL
Qty: 60 | Refills: 0
Start: 2020-03-25 | End: 2020-04-23

## 2020-03-25 RX ORDER — INSULIN LISPRO 100/ML
15 VIAL (ML) SUBCUTANEOUS
Qty: 3 | Refills: 0
Start: 2020-03-25 | End: 2020-04-23

## 2020-03-25 RX ORDER — INSULIN LISPRO 100/ML
15 VIAL (ML) SUBCUTANEOUS
Qty: 0 | Refills: 0 | DISCHARGE
Start: 2020-03-25

## 2020-03-25 RX ADMIN — HEPARIN SODIUM 5000 UNIT(S): 5000 INJECTION INTRAVENOUS; SUBCUTANEOUS at 13:41

## 2020-03-25 RX ADMIN — Medication 15 UNIT(S): at 08:03

## 2020-03-25 RX ADMIN — PIPERACILLIN AND TAZOBACTAM 25 GRAM(S): 4; .5 INJECTION, POWDER, LYOPHILIZED, FOR SOLUTION INTRAVENOUS at 13:41

## 2020-03-25 RX ADMIN — Medication 15 UNIT(S): at 17:21

## 2020-03-25 RX ADMIN — HEPARIN SODIUM 5000 UNIT(S): 5000 INJECTION INTRAVENOUS; SUBCUTANEOUS at 05:56

## 2020-03-25 RX ADMIN — ERGOCALCIFEROL 50000 UNIT(S): 1.25 CAPSULE ORAL at 11:14

## 2020-03-25 RX ADMIN — Medication 2: at 17:20

## 2020-03-25 RX ADMIN — PIPERACILLIN AND TAZOBACTAM 25 GRAM(S): 4; .5 INJECTION, POWDER, LYOPHILIZED, FOR SOLUTION INTRAVENOUS at 05:56

## 2020-03-25 RX ADMIN — Medication 15 UNIT(S): at 12:11

## 2020-03-25 RX ADMIN — Medication 2: at 08:03

## 2020-03-25 RX ADMIN — Medication 2: at 12:10

## 2020-03-25 RX ADMIN — Medication 3 MILLIGRAM(S): at 00:06

## 2020-03-25 NOTE — PROGRESS NOTE ADULT - ATTENDING COMMENTS
for debridement -flush out and graft placement as white count has decreased and patient foot although still at risk for amputation has improved   RIsks of amp proximal discussed on multiple occasions
pt understands risk for continued infection with more proximal amputation
I agree with above
Note not saved in the system
59 y M from home, walks independently with no Significant PMHx and PSHx of Right achilles tendon repair presented to ED from Urgent care for worsening Right foot swelling,discoloration  and pain since 4 days     T(C): 37.4 (03-16-20 @ 17:30), Max: 37.9 (03-16-20 @ 12:27)  HR: 100 (03-16-20 @ 17:30) (100 - 120)  BP: 117/74 (03-16-20 @ 17:30) (107/68 - 117/74)  RR: 18 (03-16-20 @ 15:23) (18 - 18)  SpO2: 100% (03-16-20 @ 17:30) (97% - 100%)    Right foot edema , erythema and 5th toe discoloration, erythema edema  extending to entire foot . interdigital maceration mainly located at 4th interspace, red streaking at ant aspect of right leg. blistering with serosanguineous fluid at dorsal and plantar foot.     Sepsis  Rt foot cellulitis  Mild DKA   KESHIA     IV abx, continue with Clindamycin and Zosyn. s/p OR debridement, follow up OR cultures.   Spoke to ID will add Zosyn, follow up blood and OR cultures. MRI r/o Osteomyelitis to determine length of abx course.  DKA- s/p iv regular Insulin, add Lantus and pre meal, iv hydration. Endo consult appreciated.    KESHIA resolving  Hyponatremia- Pseudohyponatremia from Hyperglycemia
Patient seen and examined.   Sepsis  Rt foot infection  s/p bedside I&d right foot  in ED with drainage of 10 cc of pus 3/17  -s/p OR Incision and drainage and partial 5th ray resection right foot 3/17  -for second debridement second OR debridement of non viable tissue with graft placement
Patient seen and examined.     T(C): 36.6 (03-19-20 @ 14:34), Max: 36.6 (03-19-20 @ 14:34)  HR: 92 (03-19-20 @ 14:34) (92 - 92)  BP: 122/74 (03-19-20 @ 14:34) (122/74 - 122/74)  RR: 18 (03-19-20 @ 14:34) (18 - 18)  SpO2: 93% (03-19-20 @ 14:34) (93% - 93%)    Sepsis  Rt foot infection  s/p bedside I&d right foot  in ED with drainage of 10 cc of pus 3/17  -s/p OR Incision and drainage and partial 5th ray resection right foot 3/17  RCR1 score 1 WITH 6 % 30 % MORTALITY RISK

## 2020-03-25 NOTE — PROGRESS NOTE ADULT - REASON FOR ADMISSION
Right foot swelling

## 2020-03-25 NOTE — PROGRESS NOTE ADULT - SUBJECTIVE AND OBJECTIVE BOX
Pt admitted to medicine for R foot infection  and mild DKA  . Pt to OR with Podiatry 3-16-20 for ray Amputation and debridement and 3-20-20 for debridement on necritic fascitis tissue 3-23 for further debridement and wound vac placement. On iv Zosyn with ID follow up.    Alert NAD  ICU Vital Signs Last 24 Hrs  T(C): 36.7 (25 Mar 2020 05:50), Max: 36.7 (24 Mar 2020 12:45)  T(F): 98.1 (25 Mar 2020 05:50), Max: 98.1 (25 Mar 2020 05:50)  HR: 82 (25 Mar 2020 05:50) (82 - 83)  BP: 129/81 (25 Mar 2020 05:50) (129/81 - 148/86)  BP(mean): --  ABP: --  ABP(mean): --  RR: 17 (25 Mar 2020 05:50) (17 - 18)  SpO2: 96% (25 Mar 2020 05:50) (95% - 97%)     Right foot: dressed, dressing s- signs of bleeding or drainage presently  Abd: soft nt nd  Lungs:cta                              10.9   10.84 )-----------( 497      ( 23 Mar 2020 08:40 )             35.7     03-23    139  |  103  |  7   ----------------------------<  187<H>  4.3   |  32<H>  |  0.89    Ca    9.2      23 Mar 2020 08:40      Culture - Surgical Swab (collected 20 Mar 2020 15:11)  Source: .Surgical Swab  right foot abcess  Preliminary Report (21 Mar 2020 12:59):    No growth         Problem/Plan - 1:  ·  Problem: Sepsis.  improved clinically on abx and post Amputation and debridement.     Problem/Plan - 2:  ·  Problem: Foot infection.  Plan: s/p OR partial 5th ray amp with I&D, and s/p second OR debridement> sec to Nec fasc with soft tissue emphysema Right foot  Podiatry, ID following.   3rd OR debridement of non viable tissue with wound vac application  Monday 3/23/20     Problem/Plan - 3:  ·  Problem: KESHIA (acute kidney injury).  Plan: Cr 0.89  - likely 2/2 sepsis and dehydration   - now resolved.      Problem/Plan - 4:  ·  Problem: Diabetes mellitus.  Plan: Pt doesn't have any h/o DM nor on any meds   - Tx as per endocrine  - A1c 14.4.      Problem/Plan - 5:  ·  Problem: Need for prophylactic measure.  Plan: IMPROVE VTE Individual Risk Assessment.

## 2020-03-25 NOTE — DISCHARGE NOTE NURSING/CASE MANAGEMENT/SOCIAL WORK - PATIENT PORTAL LINK FT
You can access the FollowMyHealth Patient Portal offered by [facility] by registering at the following website: [email or web address]. By joining [employer]’s FollowMyHealth portal, you will also be able to view your health information using other applications (apps) compatible with our system.

## 2020-03-25 NOTE — PROGRESS NOTE ADULT - SUBJECTIVE AND OBJECTIVE BOX
Podiatry interval HPI: Patient is s/p Right foot debridement with Integra graft and Negative Pressure wound therapy placement 3/23. Patient has extended dwell in place. Offers no complaints, wound vac in place functioning at 125 continuous without issues.   Patient is afebrile, no new labs this AM. No physical exam today. Home vac is delivered bedside, CM is setting up VNS for wound vac change and infusion. Patient is stable for discharge from podiatry standpoint. Wound vac changed today connected to home vac machine to be changed with VNS.       S : 59y year old Male seen at bedside for Right foot swelling, rednees and blister formation. Pt states that he thinks he stepped on a splinter or some small foreign body about 1 1/2 week ago. He states that at that time he had small amount of discomfort which has been getting worst since Thursday. Pt states he went to urgent care this morning and doctor asked him to go to ED. Pt denies any F/C/N/V/SOB.    PMH: Pt denies any PMH but states that his fingerstick in hospital is high  PSH:     Allergies: No Known Allergies      MEDICATIONS  (STANDING):  ergocalciferol 96328 Unit(s) Oral <User Schedule>  heparin  Injectable 5000 Unit(s) SubCutaneous every 8 hours  influenza   Vaccine 0.5 milliLiter(s) IntraMuscular once  insulin glargine Injectable (LANTUS) 46 Unit(s) SubCutaneous at bedtime  insulin lispro (HumaLOG) corrective regimen sliding scale   SubCutaneous three times a day before meals  insulin lispro (HumaLOG) corrective regimen sliding scale   SubCutaneous at bedtime  insulin lispro Injectable (HumaLOG) 15 Unit(s) SubCutaneous three times a day before meals  melatonin 3 milliGRAM(s) Oral at bedtime  piperacillin/tazobactam IVPB.. 3.375 Gram(s) IV Intermittent every 8 hours    MEDICATIONS  (PRN):  acetaminophen   Tablet .. 650 milliGRAM(s) Oral every 6 hours PRN Temp greater or equal to 38C (100.4F), Mild Pain (1 - 3)  oxycodone    5 mG/acetaminophen 325 mG 1 Tablet(s) Oral every 4 hours PRN Severe Pain (7 - 10)        LABS: no updated labs.                         10.9   10.84 )-----------( 497      ( 23 Mar 2020 08:40 )             35.7       03-23    139  |  103  |  7   ----------------------------<  187<H>  4.3   |  32<H>  |  0.89    Ca    9.2      23 Mar 2020 08:40      Vital Signs Last 24 Hrs  T(C): 36.7 (25 Mar 2020 05:50), Max: 36.7 (25 Mar 2020 05:50)  T(F): 98.1 (25 Mar 2020 05:50), Max: 98.1 (25 Mar 2020 05:50)  HR: 82 (25 Mar 2020 05:50) (82 - 83)  BP: 129/81 (25 Mar 2020 05:50) (129/81 - 148/86)  RR: 17 (25 Mar 2020 05:50) (17 - 17)  SpO2: 96% (25 Mar 2020 05:50) (96% - 97%)      Hemoglobin A1C, Whole Blood: 14.6 % (20 @ 14:48)  Hemoglobin A1C, Whole Blood: 14.4 % (20 @ 00:09)      C-Reactive Protein, Serum: 36.53 mg/dL (20 @ 00:03)                             O:   General: Pleasant  male NAD & AOX3.      No physical exam today. Dressing to right foot clean, dry and intact with wound vac attached and functioning properly.       RLE focused  ACC: dorasal foot ulcer measuring about 66cul12vpt1.5cm, mostly grandular wound base with exposed tendons, Integra bilayer graft in place with sutures intact and secured to surrounding skin border. periwound erythema again noted however improved. Edema improved. No drainage or purulence expressed. 4th interspace fibrogranular wound base.   Vascular: Dorsalis Pedis and Posterior Tibial pulses 0/4. audible via doppler right foot.  Capillary re-fill time less then 3 to remaining digits. toes warm to touch, appears well perfused.   Neuro: Protective sensation diminished to the level of the digits.        EXAM:  US PHYSIOL LWR EXT 3+ LEV BI                            PROCEDURE DATE:  2020          INTERPRETATION:  Clinical indication: Right foot ulcer     Comparison: None    Findings: There are biphasic waveforms bilaterally, slightly dampenedat the level of the right digit. There is no abnormal segmental pressure gradient identified.    Right JESUS = 1.04  Left JESUS = 1.15    Impression: Normal ABIs. Slightly dampened waveforms at the level of the right digit.      NATASHA MARCUS M.D., ATTENDING RADIOLOGIST  This document has been electronically signed. Mar 17 2020  9:38AM    -----------------------------------------------------------------------------------------------------------------------------------------------------    EXAM:  MR FOOT IC RT                            PROCEDURE DATE:  2020          INTERPRETATION:    MRI OF THE RIGHT FOOT    CLINICAL INFORMATION: Right foot pain and swelling. Evaluate for osteomyelitis  TECHNIQUE: Multiplanar, multisequence MRI was obtained of the RIGHT FOOT. The study was performed before and after the intravenous administration of 11 ml Gadavist (4 ml discarded) .  COMPARISON: Right foot radiographs 6T 2020.    FINDINGS:    SOFT TISSUES: Patient status post partial amputation of the fifth ray to the level of the mid diaphysis of the fifth metatarsal. There is a large postsurgical soft tissue defect along the lateral aspect of the foot with associated packing in the bandage material. No focal drainable fluid collections or abscesses are identified.  BONE MARROW: As mentioned above, patient is status post partial amputation of the fifth ray to the level of the diaphysis of the fifth metatarsal. Marrow signal is within normal limits. No abnormal edema, loss of T1 hyperintense signal, or enhancement.    MUSCLES AND TENDONS: Diffuse edema and atrophy of the imaged musculature.  SYNOVIUM/ JOINT FLUID: No large joint effusion.  CARTILAGE AND SUBCHONDRAL BONE: Articular cartilage is maintained.  LIGAMENTS AND CAPSULAR STRUCTURES: Lisfranc ligament is intact.      IMPRESSION:  1.  No MR evidence of acute osteomyelitis.  2.  Sequelae of partial amputation of the fifth ray, as described above.          KERRIE MCNEAL M.D., ATTENDING RADIOLOGIST  This document has been electronically signed. Mar 17 2020 12:08PM    -----------------------------------------------------------------------------------------------------------------------------        A: s/p OR I&D and partial 5th ray resection Right  foot Necrotizing fascitis( 3/17)      s/p OR second debridement and abscess drainage 3/20 Right foot      s/p Debridement and Integra graft placemen with NPWT 3/23        P:   Chart reviewed and Patient evaluated  Discussed diagnosis and treatment with patient. Pt is aware that he is at risk of loosing his right foot.  Previous xray and MRI reviewed  Continue with IV antibiotics per ID  JESUS reviewed  Wound vac changed today and connected to home vac unit. Seal checked. Setting at continuous 125mmgH.   NWB to right, FWB left   No drainage or purulence expressed today, Integra graft well adhered and intact  Extended dwell in place.   Patient will need to be DC'd home with Invanz  Patient is to follow up at [address] [place] [address] wound center next week.. [phone]  Discussed with Attending Dr. Eng    Wound care Instruction:  Leave Integra graft in place, apply adaptic on top of the graft, use black sponge to cover the wound and the 4th webspace. then cover with 4x4 gauze, ABD pad, rah/kerlix and ACE bandage.   Wound Vac settinmmHg continuous, to be changed every other day.   Alternate dressing when wound vac not in use: saline wet to dry using 4x4 guaze, cover with ABD kerlix and ACE.

## 2020-03-25 NOTE — PROGRESS NOTE ADULT - SUBJECTIVE AND OBJECTIVE BOX
59y Male is under our care for gas gangrene and acute osteo of right 5th toe/ foot and cellulitis of right foot/ leg, s/p several surgical interventions. Patient is doing well and reports right foot pain is tolerable. Had extended dwell catheter placed yesterday. Due for dc home today on IV antibiotics.      REVIEW OF SYSTEMS:  [  ] Not able to illicit  General: no fevers no malaise  Chest: no cough no sob  GI: no nvd  Skin: no rashes  Musculoskeletal: no trauma no LBP   +tolerable foot pain   Neuro: no ha's no dizziness     MEDS:  piperacillin/tazobactam IVPB.. 3.375 Gram(s) IV Intermittent every 8 hours    ALLERGIES: Allergies    No Known Allergies    Intolerances      VITALS:  Vital Signs Last 24 Hrs  T(C): 36.7 (25 Mar 2020 05:50), Max: 36.7 (25 Mar 2020 05:50)  T(F): 98.1 (25 Mar 2020 05:50), Max: 98.1 (25 Mar 2020 05:50)  HR: 82 (25 Mar 2020 05:50) (82 - 83)  BP: 129/81 (25 Mar 2020 05:50) (129/81 - 148/86)  BP(mean): --  RR: 17 (25 Mar 2020 05:50) (17 - 17)  SpO2: 96% (25 Mar 2020 05:50) (96% - 97%)      PHYSICAL EXAM:  HEENT: n/a  Neck: supple no LN's   Respiratory: lungs clear no rales  Cardiovascular: S1 S2 reg no murmurs  Gastrointestinal: +BS with soft, nondistended abdomen; nontender  Extremities: no edema, +left arm extended dwell cath  Skin: resolved right lower leg cellulitis +right foot wrapped and secured with wound vac  Ortho: n/a  Neuro: awake and alert      LABS/DIAGNOSTIC TESTS:   No new labs            CULTURES:   .Surgical Swab  right foot abcess  03-20 @ 15:11   No growth  --  --      .Surgical Swab  03-17 @ 10:04   Moderate Finegoldia magna "Susceptibilities not performed"  Normal skin ward isolated  --  --      .Blood  03-16 @ 22:40   No growth at 5 days.  --  --      .Blood  03-16 @ 22:39   No growth at 5 days.  --  --        RADIOLOGY:  no new studies

## 2020-03-25 NOTE — PROGRESS NOTE ADULT - ASSESSMENT
Problem/Plan - 1:  ·  Problem: Sepsis.  improved clinically on abx and post Amputation and debridement.     Problem/Plan - 2:  ·  Problem: Foot infection.  Plan: s/p OR partial 5th ray amp with I&D, and s/p second OR debridement> sec to Nec fasc with soft tissue emphysema Right foot  Podiatry, ID following.   3rd OR debridement of non viable tissue with wound vac application  Monday 3/23/20     Problem/Plan - 3:  ·  Problem: KESHIA (acute kidney injury).  Plan: Cr 0.89  - likely 2/2 sepsis and dehydration   - now resolved.      Problem/Plan - 4:  ·  Problem: Diabetes mellitus.  Plan: Pt doesn't have any h/o DM nor on any meds   - Tx as per endocrine  - A1c 14.4.      Problem/Plan - 5:  ·  Problem: Need for prophylactic measure.  Plan: IMPROVE VTE Individual Risk Assessment.

## 2020-03-25 NOTE — PROGRESS NOTE ADULT - ASSESSMENT
1.	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	Cellulitis of right foot/ leg - resolved  ·	dc planning today  ·	dc on invanz 1gm IV daily for 28 days  ·	going with wound vac, will follow up with podiatry as an outpatient   ·	reconsult prn 1.	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  2.	Cellulitis of right foot/ leg - resolved  ·	dc planning today  ·	dc on invanz 1gm IV daily for 28 days  ·	going with wound vac, will follow up with podiatry as an outpatient

## 2020-03-26 RX ORDER — ENOXAPARIN SODIUM 100 MG/ML
46 INJECTION SUBCUTANEOUS
Qty: 2 | Refills: 0
Start: 2020-03-26 | End: 2020-04-24

## 2020-03-26 RX ORDER — INSULIN LISPRO 100/ML
15 VIAL (ML) SUBCUTANEOUS
Qty: 3 | Refills: 0
Start: 2020-03-26 | End: 2020-04-24

## 2020-06-12 ENCOUNTER — APPOINTMENT (OUTPATIENT)
Dept: DISASTER EMERGENCY | Facility: CLINIC | Age: 60
End: 2020-06-12

## 2020-06-12 ENCOUNTER — OUTPATIENT (OUTPATIENT)
Dept: OUTPATIENT SERVICES | Facility: HOSPITAL | Age: 60
LOS: 1 days | End: 2020-06-12

## 2020-06-12 VITALS
TEMPERATURE: 98 F | OXYGEN SATURATION: 100 % | SYSTOLIC BLOOD PRESSURE: 114 MMHG | HEIGHT: 73 IN | RESPIRATION RATE: 16 BRPM | WEIGHT: 210.1 LBS | HEART RATE: 90 BPM | DIASTOLIC BLOOD PRESSURE: 83 MMHG

## 2020-06-12 DIAGNOSIS — Z98.890 OTHER SPECIFIED POSTPROCEDURAL STATES: Chronic | ICD-10-CM

## 2020-06-12 DIAGNOSIS — Z01.818 ENCOUNTER FOR OTHER PREPROCEDURAL EXAMINATION: ICD-10-CM

## 2020-06-12 DIAGNOSIS — E11.9 TYPE 2 DIABETES MELLITUS WITHOUT COMPLICATIONS: ICD-10-CM

## 2020-06-12 DIAGNOSIS — Z89.422 ACQUIRED ABSENCE OF OTHER LEFT TOE(S): Chronic | ICD-10-CM

## 2020-06-12 DIAGNOSIS — E11.65 TYPE 2 DIABETES MELLITUS WITH HYPERGLYCEMIA: ICD-10-CM

## 2020-06-12 DIAGNOSIS — E11.621 TYPE 2 DIABETES MELLITUS WITH FOOT ULCER: ICD-10-CM

## 2020-06-12 PROBLEM — Z00.00 ENCOUNTER FOR PREVENTIVE HEALTH EXAMINATION: Status: ACTIVE | Noted: 2020-06-12

## 2020-06-12 RX ORDER — CHLORHEXIDINE GLUCONATE 213 G/1000ML
1 SOLUTION TOPICAL
Qty: 1 | Refills: 0
Start: 2020-06-12

## 2020-06-12 NOTE — H&P PST ADULT - REASON FOR ADMISSION
H/O infection in right foot, 5th toe ampution. Dressing done to right foot by a visiting nurse. Now I am going for a graft on the right foot H/O infection in right foot, 5th toe amputation. Dressing done to right foot by a visiting nurse. Now I am going for a graft on the right foot

## 2020-06-12 NOTE — H&P PST ADULT - ACTIVITY
Exercise tolerance limited by right foot ulcer Exercise tolerance limited by right foot ulcer discomfort

## 2020-06-12 NOTE — H&P PST ADULT - ASSESSMENT
58 yo male is scheduled for :  Right Foot Debridement of Ulcer with Application of Graft, on 6/15/20

## 2020-06-12 NOTE — H&P PST ADULT - NSICDXPROBLEM_GEN_ALL_CORE_FT
PROBLEM DIAGNOSES  Problem: Diabetes  Assessment and Plan: No blood sugar medications before coming for surgery PROBLEM DIAGNOSES  Problem: Uncontrolled type 2 diabetes mellitus with foot ulcer  Assessment and Plan:  Right Foot Debridement of Ulcer with Application of Graft    Problem: Diabetes  Assessment and Plan: No blood sugar medications before coming for surgery

## 2020-06-12 NOTE — H&P PST ADULT - NEGATIVE ALLERGY TYPES
no outdoor environmental allergies/no indoor environmental allergies/no reactions to animals/no reactions to medicines/no reactions to food/no reactions to insect bites

## 2020-06-12 NOTE — H&P PST ADULT - HISTORY OF PRESENT ILLNESS
Due to the Covid Pandemic, all informations are obtained over the phone. 58 yo male with history of DM, HLD, reports the above.  He is scheduled for Right Foot Debridement of Ulcer with Application of Graft, on 6/15/20

## 2020-06-12 NOTE — H&P PST ADULT - NSANTHOSAYNRD_GEN_A_CORE
No. TESSA screening performed.  STOP BANG Legend: 0-2 = LOW Risk; 3-4 = INTERMEDIATE Risk; 5-8 = HIGH Risk

## 2020-06-13 LAB — SARS-COV-2 N GENE NPH QL NAA+PROBE: NOT DETECTED

## 2020-06-14 ENCOUNTER — TRANSCRIPTION ENCOUNTER (OUTPATIENT)
Age: 60
End: 2020-06-14

## 2020-06-15 ENCOUNTER — OUTPATIENT (OUTPATIENT)
Dept: OUTPATIENT SERVICES | Facility: HOSPITAL | Age: 60
LOS: 1 days | End: 2020-06-15
Payer: COMMERCIAL

## 2020-06-15 VITALS
HEIGHT: 73 IN | WEIGHT: 210.1 LBS | SYSTOLIC BLOOD PRESSURE: 114 MMHG | TEMPERATURE: 98 F | HEART RATE: 112 BPM | OXYGEN SATURATION: 100 % | RESPIRATION RATE: 18 BRPM | DIASTOLIC BLOOD PRESSURE: 83 MMHG

## 2020-06-15 VITALS
HEART RATE: 79 BPM | RESPIRATION RATE: 16 BRPM | SYSTOLIC BLOOD PRESSURE: 99 MMHG | TEMPERATURE: 98 F | OXYGEN SATURATION: 100 % | DIASTOLIC BLOOD PRESSURE: 73 MMHG

## 2020-06-15 DIAGNOSIS — E11.621 TYPE 2 DIABETES MELLITUS WITH FOOT ULCER: ICD-10-CM

## 2020-06-15 DIAGNOSIS — L97.519 NON-PRESSURE CHRONIC ULCER OF OTHER PART OF RIGHT FOOT WITH UNSPECIFIED SEVERITY: ICD-10-CM

## 2020-06-15 DIAGNOSIS — Z98.890 OTHER SPECIFIED POSTPROCEDURAL STATES: Chronic | ICD-10-CM

## 2020-06-15 DIAGNOSIS — Z89.422 ACQUIRED ABSENCE OF OTHER LEFT TOE(S): Chronic | ICD-10-CM

## 2020-06-15 LAB
GLUCOSE BLDC GLUCOMTR-MCNC: 129 MG/DL — HIGH (ref 70–99)
GLUCOSE BLDC GLUCOMTR-MCNC: 138 MG/DL — HIGH (ref 70–99)

## 2020-06-15 PROCEDURE — 82962 GLUCOSE BLOOD TEST: CPT

## 2020-06-15 PROCEDURE — 99261: CPT

## 2020-06-15 PROCEDURE — 15275 SKIN SUB GRAFT FACE/NK/HF/G: CPT

## 2020-06-15 PROCEDURE — 15004 WOUND PREP F/N/HF/G: CPT

## 2020-06-15 RX ORDER — ATORVASTATIN CALCIUM 80 MG/1
1 TABLET, FILM COATED ORAL
Qty: 0 | Refills: 0 | DISCHARGE

## 2020-06-15 RX ORDER — SODIUM CHLORIDE 9 MG/ML
3 INJECTION INTRAMUSCULAR; INTRAVENOUS; SUBCUTANEOUS EVERY 8 HOURS
Refills: 0 | Status: DISCONTINUED | OUTPATIENT
Start: 2020-06-15 | End: 2020-06-15

## 2020-06-15 RX ORDER — CHOLECALCIFEROL (VITAMIN D3) 125 MCG
1 CAPSULE ORAL
Qty: 0 | Refills: 0 | DISCHARGE

## 2020-06-15 RX ORDER — HYDROMORPHONE HYDROCHLORIDE 2 MG/ML
0.5 INJECTION INTRAMUSCULAR; INTRAVENOUS; SUBCUTANEOUS
Refills: 0 | Status: DISCONTINUED | OUTPATIENT
Start: 2020-06-15 | End: 2020-06-15

## 2020-06-15 RX ORDER — ONDANSETRON 8 MG/1
4 TABLET, FILM COATED ORAL ONCE
Refills: 0 | Status: DISCONTINUED | OUTPATIENT
Start: 2020-06-15 | End: 2020-06-15

## 2020-06-15 NOTE — BRIEF OPERATIVE NOTE - NSICDXBRIEFPROCEDURE_GEN_ALL_CORE_FT
PROCEDURES:  Application of graft to foot 15-Usama-2020 09:16:57  Ale Shah  Incision and debridement, foot 15-Usama-2020 09:16:43  Ale Shah

## 2020-06-15 NOTE — ASU DISCHARGE PLAN (ADULT/PEDIATRIC) - CALL YOUR DOCTOR IF YOU HAVE ANY OF THE FOLLOWING:
Pain not relieved by Medications/Numbness, tingling, color or temperature change to extremity/Increased irritability or sluggishness/Swelling that gets worse/Excessive diarrhea/Inability to tolerate liquids or foods/Nausea and vomiting that does not stop/Wound/Surgical Site with redness, or foul smelling discharge or pus/Bleeding that does not stop/Unable to urinate

## 2020-06-15 NOTE — ASU DISCHARGE PLAN (ADULT/PEDIATRIC) - CARE PROVIDER_API CALL
Clemente Eng  FOOT SURGERY  [address] [place] [address]  Phone: [phone]  Fax: [phone]  Follow Up Time:

## 2020-06-15 NOTE — BRIEF OPERATIVE NOTE - ESTIMATED BLOOD LOSS
Telephone Encounter by Alonzo Wilkerson RN at 02/06/17 04:25 PM     Author:  Alonzo Wilkerson RN Service:  (none) Author Type:  Registered Nurse     Filed:  02/06/17 04:26 PM Encounter Date:  2/6/2017 Status:  Signed     :  Alonzo Wilkerson RN (Registered Nurse)            Patient notified[BC1.1T] that Dr. Marques sent a prescription of Zoloft to the pharmacy for her per below message, patient informed to check with pharmacy later this afternoon to receive medication[BC1.1M]   Bree verbalized understanding of information given.[BC1.1T]       Revision History        User Key Date/Time User Provider Type Action    > BC1.1 02/06/17 04:26 PM Alonzo Wilkerson RN Registered Nurse Sign    M - Manual, T - Template             3

## 2020-10-27 PROBLEM — E11.9 TYPE 2 DIABETES MELLITUS WITHOUT COMPLICATIONS: Chronic | Status: ACTIVE | Noted: 2020-06-12

## 2020-10-27 PROBLEM — E78.5 HYPERLIPIDEMIA, UNSPECIFIED: Chronic | Status: ACTIVE | Noted: 2020-06-12

## 2020-11-09 ENCOUNTER — RESULT REVIEW (OUTPATIENT)
Age: 60
End: 2020-11-09

## 2020-11-09 ENCOUNTER — OUTPATIENT (OUTPATIENT)
Dept: OUTPATIENT SERVICES | Facility: HOSPITAL | Age: 60
LOS: 1 days | End: 2020-11-09
Payer: COMMERCIAL

## 2020-11-09 VITALS
TEMPERATURE: 98 F | SYSTOLIC BLOOD PRESSURE: 131 MMHG | HEIGHT: 73 IN | HEART RATE: 76 BPM | OXYGEN SATURATION: 98 % | DIASTOLIC BLOOD PRESSURE: 89 MMHG | RESPIRATION RATE: 18 BRPM | WEIGHT: 205.03 LBS

## 2020-11-09 DIAGNOSIS — E11.9 TYPE 2 DIABETES MELLITUS WITHOUT COMPLICATIONS: ICD-10-CM

## 2020-11-09 DIAGNOSIS — M14.672 CHARCOT'S JOINT, LEFT ANKLE AND FOOT: ICD-10-CM

## 2020-11-09 DIAGNOSIS — Z98.890 OTHER SPECIFIED POSTPROCEDURAL STATES: Chronic | ICD-10-CM

## 2020-11-09 DIAGNOSIS — Z01.818 ENCOUNTER FOR OTHER PREPROCEDURAL EXAMINATION: ICD-10-CM

## 2020-11-09 DIAGNOSIS — Z89.422 ACQUIRED ABSENCE OF OTHER LEFT TOE(S): Chronic | ICD-10-CM

## 2020-11-09 PROCEDURE — 73630 X-RAY EXAM OF FOOT: CPT | Mod: 26,LT

## 2020-11-09 PROCEDURE — 73630 X-RAY EXAM OF FOOT: CPT

## 2020-11-09 PROCEDURE — G0463: CPT

## 2020-11-09 NOTE — H&P PST ADULT - NSICDXPROBLEM_GEN_ALL_CORE_FT
PROBLEM DIAGNOSES  Problem: Diabetes mellitus  Assessment and Plan: finger stick on admit    Problem: Charcot's joint of left foot  Assessment and Plan: left ankle fusion of tarsometatarsal joint  left ankle fusion of subtalar joint

## 2020-11-09 NOTE — H&P PST ADULT - HISTORY OF PRESENT ILLNESS
This is a 59 y/o male c/o left foot pain, seen podiatrist for consultation, recommended surgery, he presents today for left ankle fusion.  pt denies COVID symptoms

## 2020-11-09 NOTE — H&P PST ADULT - NSICDXPASTMEDICALHX_GEN_ALL_CORE_FT
PAST MEDICAL HISTORY:  Chronic GERD     Diabetes     Diabetic ulcer of right foot s/p amputation right foot 5th digit 3/2020    Hyperlipidemia

## 2020-11-13 ENCOUNTER — APPOINTMENT (OUTPATIENT)
Dept: DISASTER EMERGENCY | Facility: CLINIC | Age: 60
End: 2020-11-13

## 2021-01-26 PROBLEM — E11.621 TYPE 2 DIABETES MELLITUS WITH FOOT ULCER: Chronic | Status: ACTIVE | Noted: 2020-06-12

## 2021-01-26 PROBLEM — K21.9 GASTRO-ESOPHAGEAL REFLUX DISEASE WITHOUT ESOPHAGITIS: Chronic | Status: ACTIVE | Noted: 2020-11-09

## 2021-02-03 ENCOUNTER — RESULT REVIEW (OUTPATIENT)
Age: 61
End: 2021-02-03

## 2021-02-03 ENCOUNTER — OUTPATIENT (OUTPATIENT)
Dept: OUTPATIENT SERVICES | Facility: HOSPITAL | Age: 61
LOS: 1 days | End: 2021-02-03
Payer: COMMERCIAL

## 2021-02-03 VITALS
WEIGHT: 210.1 LBS | DIASTOLIC BLOOD PRESSURE: 76 MMHG | TEMPERATURE: 99 F | OXYGEN SATURATION: 100 % | RESPIRATION RATE: 16 BRPM | HEIGHT: 73 IN | HEART RATE: 82 BPM | SYSTOLIC BLOOD PRESSURE: 122 MMHG

## 2021-02-03 DIAGNOSIS — M14.672 CHARCOT'S JOINT, LEFT ANKLE AND FOOT: ICD-10-CM

## 2021-02-03 DIAGNOSIS — Z98.890 OTHER SPECIFIED POSTPROCEDURAL STATES: Chronic | ICD-10-CM

## 2021-02-03 DIAGNOSIS — Z89.429 ACQUIRED ABSENCE OF OTHER TOE(S), UNSPECIFIED SIDE: Chronic | ICD-10-CM

## 2021-02-03 DIAGNOSIS — Z01.818 ENCOUNTER FOR OTHER PREPROCEDURAL EXAMINATION: ICD-10-CM

## 2021-02-03 PROCEDURE — 71046 X-RAY EXAM CHEST 2 VIEWS: CPT

## 2021-02-03 PROCEDURE — 71046 X-RAY EXAM CHEST 2 VIEWS: CPT | Mod: 26

## 2021-02-03 PROCEDURE — G0463: CPT

## 2021-02-03 PROCEDURE — 73620 X-RAY EXAM OF FOOT: CPT | Mod: 26,LT

## 2021-02-03 PROCEDURE — 73620 X-RAY EXAM OF FOOT: CPT

## 2021-02-03 RX ORDER — METFORMIN HYDROCHLORIDE 850 MG/1
1 TABLET ORAL
Qty: 0 | Refills: 0 | DISCHARGE

## 2021-02-03 RX ORDER — OMEPRAZOLE 10 MG/1
1 CAPSULE, DELAYED RELEASE ORAL
Qty: 0 | Refills: 0 | DISCHARGE

## 2021-02-03 RX ORDER — SODIUM CHLORIDE 9 MG/ML
3 INJECTION INTRAMUSCULAR; INTRAVENOUS; SUBCUTANEOUS EVERY 8 HOURS
Refills: 0 | Status: DISCONTINUED | OUTPATIENT
Start: 2021-02-18 | End: 2021-02-22

## 2021-02-03 NOTE — H&P PST ADULT - ATTENDING COMMENTS
diabetic charcot reconstruction   discussed surgery and risks as well as possible loss limb   answered questions   consent in chart

## 2021-02-03 NOTE — H&P PST ADULT - HISTORY OF PRESENT ILLNESS
60 yr old male with history of diabetes now managed with diet control HLD recently had right 5th toe amputation in March 2020 along with foot ulcer. Pt was hospitalized here had surgery had healed foot ulcer. After hospital stay, in June 2020, pt found it difficult to ambulate with left foot and applying weight on it. Pt than seen by  and being treated. Pt was scheduled prior for surgery but his blood results required him to see Nephrologist and adjust his diabetic medication. Pt stated he has lost weight and it regularly checking his sugar levels (117 -120). Pt is schedule for left talonavicular fusion and subtalar fusion on 2/18/2021. Pt was instructed on the importance of using the chlorhexidine was the morning of surgery. He verbalized understanding and written instructions provided.

## 2021-02-03 NOTE — H&P PST ADULT - ASSESSMENT
60 y old male with history of HLD diabetes (presently under control with diet and weight loss). Pt now presents with Charcot's joint left ankle and foot. Pt had been hospitalized for the right foot 5th toe amputation in March 2020 and had foot ulcer as well. Pt had ulcer healed and in June 2020 pt had difficulty with walking on his left foot. Pt under podiatrist care was schedule earlier for surgery but needed his lab work corrected before surgery. Pt now schedule for left nakul avicular and subtalar fusion on 2/18/2021.

## 2021-02-03 NOTE — H&P PST ADULT - NSICDXPASTSURGICALHX_GEN_ALL_CORE_FT
PAST SURGICAL HISTORY:  H/O Achilles tendon repair     H/O amputation of lesser toe right 5th toe

## 2021-02-03 NOTE — H&P PST ADULT - NSICDXPASTMEDICALHX_GEN_ALL_CORE_FT
PAST MEDICAL HISTORY:  Charcot's joint, left ankle and foot     Chronic GERD     Diabetes     Diabetic ulcer of right foot s/p amputation right foot 5th digit 3/2020    Hyperlipidemia

## 2021-02-03 NOTE — H&P PST ADULT - NSICDXPROBLEM_GEN_ALL_CORE_FT
PROBLEM DIAGNOSES  Problem: Charcot's joint, left ankle and foot  Assessment and Plan: schedule for left talonavicular fusion and subtalar fusion

## 2021-02-13 DIAGNOSIS — Z01.818 ENCOUNTER FOR OTHER PREPROCEDURAL EXAMINATION: ICD-10-CM

## 2021-02-15 ENCOUNTER — APPOINTMENT (OUTPATIENT)
Dept: DISASTER EMERGENCY | Facility: CLINIC | Age: 61
End: 2021-02-15

## 2021-02-16 LAB — SARS-COV-2 N GENE NPH QL NAA+PROBE: NOT DETECTED

## 2021-02-17 ENCOUNTER — TRANSCRIPTION ENCOUNTER (OUTPATIENT)
Age: 61
End: 2021-02-17

## 2021-02-18 ENCOUNTER — INPATIENT (INPATIENT)
Facility: HOSPITAL | Age: 61
LOS: 3 days | Discharge: HOME HEALTH | DRG: 983 | End: 2021-02-22
Attending: STUDENT IN AN ORGANIZED HEALTH CARE EDUCATION/TRAINING PROGRAM | Admitting: STUDENT IN AN ORGANIZED HEALTH CARE EDUCATION/TRAINING PROGRAM
Payer: COMMERCIAL

## 2021-02-18 VITALS
TEMPERATURE: 98 F | WEIGHT: 210.1 LBS | OXYGEN SATURATION: 100 % | HEIGHT: 73 IN | HEART RATE: 96 BPM | DIASTOLIC BLOOD PRESSURE: 68 MMHG | RESPIRATION RATE: 16 BRPM | SYSTOLIC BLOOD PRESSURE: 107 MMHG

## 2021-02-18 DIAGNOSIS — M14.672 CHARCOT'S JOINT, LEFT ANKLE AND FOOT: ICD-10-CM

## 2021-02-18 DIAGNOSIS — N18.9 CHRONIC KIDNEY DISEASE, UNSPECIFIED: ICD-10-CM

## 2021-02-18 DIAGNOSIS — Z89.429 ACQUIRED ABSENCE OF OTHER TOE(S), UNSPECIFIED SIDE: Chronic | ICD-10-CM

## 2021-02-18 DIAGNOSIS — E78.5 HYPERLIPIDEMIA, UNSPECIFIED: ICD-10-CM

## 2021-02-18 DIAGNOSIS — Z29.9 ENCOUNTER FOR PROPHYLACTIC MEASURES, UNSPECIFIED: ICD-10-CM

## 2021-02-18 DIAGNOSIS — Z98.890 OTHER SPECIFIED POSTPROCEDURAL STATES: Chronic | ICD-10-CM

## 2021-02-18 DIAGNOSIS — E11.9 TYPE 2 DIABETES MELLITUS WITHOUT COMPLICATIONS: ICD-10-CM

## 2021-02-18 LAB
GLUCOSE BLDC GLUCOMTR-MCNC: 110 MG/DL — HIGH (ref 70–99)
GLUCOSE BLDC GLUCOMTR-MCNC: 89 MG/DL — SIGNIFICANT CHANGE UP (ref 70–99)

## 2021-02-18 PROCEDURE — 73630 X-RAY EXAM OF FOOT: CPT | Mod: 26,LT

## 2021-02-18 PROCEDURE — 73650 X-RAY EXAM OF HEEL: CPT | Mod: 26,LT,59

## 2021-02-18 PROCEDURE — 99222 1ST HOSP IP/OBS MODERATE 55: CPT

## 2021-02-18 RX ORDER — INSULIN LISPRO 100/ML
VIAL (ML) SUBCUTANEOUS
Refills: 0 | Status: DISCONTINUED | OUTPATIENT
Start: 2021-02-18 | End: 2021-02-22

## 2021-02-18 RX ORDER — HYDROMORPHONE HYDROCHLORIDE 2 MG/ML
0.5 INJECTION INTRAMUSCULAR; INTRAVENOUS; SUBCUTANEOUS
Refills: 0 | Status: DISCONTINUED | OUTPATIENT
Start: 2021-02-18 | End: 2021-02-18

## 2021-02-18 RX ORDER — ATORVASTATIN CALCIUM 80 MG/1
20 TABLET, FILM COATED ORAL AT BEDTIME
Refills: 0 | Status: DISCONTINUED | OUTPATIENT
Start: 2021-02-18 | End: 2021-02-22

## 2021-02-18 RX ORDER — PANTOPRAZOLE SODIUM 20 MG/1
40 TABLET, DELAYED RELEASE ORAL
Refills: 0 | Status: DISCONTINUED | OUTPATIENT
Start: 2021-02-18 | End: 2021-02-22

## 2021-02-18 RX ORDER — SODIUM CHLORIDE 9 MG/ML
1000 INJECTION, SOLUTION INTRAVENOUS
Refills: 0 | Status: DISCONTINUED | OUTPATIENT
Start: 2021-02-18 | End: 2021-02-18

## 2021-02-18 RX ORDER — ACETAMINOPHEN 500 MG
1000 TABLET ORAL ONCE
Refills: 0 | Status: DISCONTINUED | OUTPATIENT
Start: 2021-02-18 | End: 2021-02-18

## 2021-02-18 RX ORDER — FOLIC ACID 0.8 MG
1 TABLET ORAL DAILY
Refills: 0 | Status: DISCONTINUED | OUTPATIENT
Start: 2021-02-18 | End: 2021-02-22

## 2021-02-18 RX ORDER — ONDANSETRON 8 MG/1
4 TABLET, FILM COATED ORAL ONCE
Refills: 0 | Status: DISCONTINUED | OUTPATIENT
Start: 2021-02-18 | End: 2021-02-18

## 2021-02-18 RX ORDER — OXYCODONE AND ACETAMINOPHEN 5; 325 MG/1; MG/1
1 TABLET ORAL EVERY 6 HOURS
Refills: 0 | Status: DISCONTINUED | OUTPATIENT
Start: 2021-02-18 | End: 2021-02-22

## 2021-02-18 RX ADMIN — SODIUM CHLORIDE 3 MILLILITER(S): 9 INJECTION INTRAMUSCULAR; INTRAVENOUS; SUBCUTANEOUS at 13:13

## 2021-02-18 RX ADMIN — ATORVASTATIN CALCIUM 20 MILLIGRAM(S): 80 TABLET, FILM COATED ORAL at 21:52

## 2021-02-18 RX ADMIN — OXYCODONE AND ACETAMINOPHEN 1 TABLET(S): 5; 325 TABLET ORAL at 21:52

## 2021-02-18 RX ADMIN — SODIUM CHLORIDE 3 MILLILITER(S): 9 INJECTION INTRAMUSCULAR; INTRAVENOUS; SUBCUTANEOUS at 21:53

## 2021-02-18 NOTE — H&P ADULT - PROBLEM SELECTOR PLAN 1
Pt admitted for surgical repair for the L charcot foot.   Pt had undergone 5th toe amputation R side in 2020  Pt had undergone  Tendoachilles lengthening, Subtalar joint fusion, Talonavicular fusion, NC fusion, and 1st met cuneiform fusion with application of posterior splint today 2/18  by the Podiatry team.  Pt admitted to medicine floor for Post OP monitoring and pain medications.   C/W pain meds- Dilaudid and percocet   DVT ppx to be started tomorrow   Resumed on diet

## 2021-02-18 NOTE — H&P ADULT - PROBLEM SELECTOR PLAN 4
Pt has PMH of CKD  Baseline Cr as per OP lab work up from 2/17  Cr . 1.37   f/u bmp and urine lytes in am  avoid nephrotoxic drugs

## 2021-02-18 NOTE — H&P ADULT - HISTORY OF PRESENT ILLNESS
60M from home, now at bed rest with PMH of DM, HLD, S/P  5th toe amputation in March 2020 along with foot ulcer is admitted for sugical repair for the Charcot foot. Pt was hospitalized here in June 2020 and had  undergone surgery had healed foot ulcer. After hospital stay, in June 2020, pt found it difficult to ambulate with left foot and applying weight on it. Pt follows   OP and was recommended to get a surgical repair. Pt had undergone  Tendoachilles lengthening, Subtalar joint fusion, Talonavicular fusion, NC fusion, and 1st met cuneiform fusion with application of posterior splint today by the Podiatry team.   Pt admitted to medicine floor for Post OP monitoring and pain medications.     Pt was seen and evaluated in the PACU   POD# 0   Vitals-   L foot xray- Chrcot joint   RBS 68      60M from home, walks with special boots,  now at bed rest with PMH of DM(not on meds), HLD, CKD, S/P  5th toe R foot amputation in March 2020 along with foot ulcer is admitted now  for surgical repair for the Charcot foot. Pt was hospitalized here in June 2020 and had  undergone surgery had R healed foot ulcer. Since last year, Pt noted gradual onset of swellling and pain in the L foot and  found it difficult to ambulate with left foot . Pt follows   OP and was recommended to get a surgical repair. Pt had undergone  Tendoachilles lengthening, Subtalar joint fusion, Talonavicular fusion, NC fusion, and 1st met cuneiform fusion with application of posterior splint today 2/18  by the Podiatry team. Pt also endorses mild L shoulder pian  Pt has DM but mentions that has BS has been in control since a while and is currently OFF all medication( earlier on insulin and metformin).   Pt admitted to medicine floor for Post OP monitoring and pain medications.   Pt had quit smoking 2 yrs ago, takes alcohol occasionally and had had quit cocaine/trimble "many" year ago.     Off note- Pt has labs from OP 2/17 that were within normal limits, Cr  1.37     Pt was seen and evaluated in the PACU   POD# 0   Vitals- 115/78, hr 70, AFEBRILE, spo2- 100% RA  L foot xray- ChrAcot joint   RBS 68

## 2021-02-18 NOTE — BRIEF OPERATIVE NOTE - NSICDXBRIEFPOSTOP_GEN_ALL_CORE_FT
POST-OP DIAGNOSIS:  Achilles tendon contracture, left 18-Feb-2021 19:27:39  Sage Crouch  Diabetic Charcot foot 18-Feb-2021 19:27:28  Sage Crouch

## 2021-02-18 NOTE — H&P ADULT - PROBLEM SELECTOR PLAN 5
IMPROVE VTE Individual Risk Assessment    RISK                                                          Points  [] Previous VTE                                           3  [] Thrombophilia                                        2  [] Lower limb paralysis                              2   [] Current Cancer                                       2   [x] Immobilization > 24 hrs                        1  [] ICU/CCU stay > 24 hours                       1  [x] Age > 60                                                   1    IMPROVE VTE Score:2  holding AC for now   ppi

## 2021-02-18 NOTE — H&P ADULT - PROBLEM SELECTOR PLAN 2
Pt has PMH of DM   As per Pt, his BS were noted to be controlled over past few months and is off all meds  Pt had stopped insulin 1 yr ago and Metformin 2 months ago  RBS- 86  C/W HSS  F/U A1C and FS

## 2021-02-18 NOTE — BRIEF OPERATIVE NOTE - NSICDXBRIEFPROCEDURE_GEN_ALL_CORE_FT
PROCEDURES:  Lengthening of left Achilles tendon 18-Feb-2021 19:26:48  Sage Crouch  Fusion of medial column of left foot 18-Feb-2021 19:25:22  Sage Crouch  Subtalar fusion 18-Feb-2021 19:24:36  Sage Crouch

## 2021-02-18 NOTE — BRIEF OPERATIVE NOTE - NSICDXBRIEFPREOP_GEN_ALL_CORE_FT
PRE-OP DIAGNOSIS:  Diabetic Charcot foot 18-Feb-2021 19:27:16  Sage Crouch  Achilles tendon contracture, left 18-Feb-2021 19:27:06  Sage Crouch

## 2021-02-18 NOTE — ASU PATIENT PROFILE, ADULT - PMH
Charcot's joint, left ankle and foot    Chronic GERD    Diabetes    Diabetic ulcer of right foot  s/p amputation right foot 5th digit 3/2020  Hyperlipidemia

## 2021-02-18 NOTE — H&P ADULT - NSHPLABSRESULTS_GEN_ALL_CORE
< from: Xray Foot AP + Lateral, Left (02.03.21 @ 11:41) >    IMPRESSION:    Localized marked of sclerotic erosion of the tail of the ventricular joint space likely indicating Charcot joint. No change compared to 11/9/2020 LEFT foot radiographs                            CAPILLARY BLOOD GLUCOSE      POCT Blood Glucose.: 89 mg/dL (18 Feb 2021 19:30)

## 2021-02-18 NOTE — H&P ADULT - ATTENDING COMMENTS
Patient is a 60 year old male with a PMH of HLD, DM, GERD, CKD, s/p 5th toe Right foot amputation in March 2020 along with foot ulcer who is s/p Podiatric surgical intervention involving Left tendoachilles lengthening, Subtalar joint fusion, Talonavicular fusion, NC fusion, and 1st met cuneiform fusion with application of posterior splint.  Patient seen and examined at bedside s/p OR.  Patient in no apparent distress and is resting comfortably in bed.    T(C): 36 (02-18-21 @ 21:27), Max: 37 (02-18-21 @ 19:26)  T(F): 96.8 (02-18-21 @ 21:27), Max: 98.6 (02-18-21 @ 19:26)  HR: 78 (02-18-21 @ 21:27) (72 - 96)  BP: 102/62 (02-18-21 @ 21:27) (102/62 - 115/78)  RR: 18 (02-18-21 @ 21:27) (13 - 18)  SpO2: 97% (02-18-21 @ 21:27) (97% - 100%)  Wt(kg): --    P/E: As above MAR    A/P:    Charcot's Joint s/p OR:  -No up to date labs available for review upon receipt of patient.  Therefore, will order CBC and CMP STAT  -Pain management as necessary without sedating  -Podiatry to follow-up    DM:  -Hemoglobin A1c with AM labs  -Blood Glucose Monitoring ACHS  -Regular Insulin Sliding Scale ACHS  -Carb Controlled, Heart Healthy, Renal (Non-Dialysis) Diet as tolerated    HLD:  -Lipid Panel with AM labs  -Will resume patient's home medication    GERD:  -PPI    GI/DVT PPx:  -PPI  -Hold AC for now

## 2021-02-18 NOTE — BRIEF OPERATIVE NOTE - OPERATION/FINDINGS
See operative report.  Included Tendoachilles lengthening, Subtalar joint fusion, Talonavicular fusion, NC fusion, and 1st met cuneiform fusion with application of posterior splint.

## 2021-02-18 NOTE — H&P ADULT - NSHPSOCIALHISTORY_GEN_ALL_CORE
Pt had quit smoking 2 yrs ago, takes alcohol occasionally and had had quit cocaine/trimble "many" year ago.

## 2021-02-18 NOTE — H&P ADULT - NSHPPHYSICALEXAM_GEN_ALL_CORE
Vital Signs (24 Hrs):  T(C): 36.9 (02-18-21 @ 20:26), Max: 37 (02-18-21 @ 19:26)  HR: 76 (02-18-21 @ 20:26) (72 - 96)  BP: 109/76 (02-18-21 @ 20:26) (107/68 - 115/78)  RR: 13 (02-18-21 @ 20:26) (13 - 16)  SpO2: 100% (02-18-21 @ 20:26) (99% - 100%)  Wt(kg): --  Daily Height in cm: 185.42 (18 Feb 2021 12:34)    Daily     I&O's Summary    18 Feb 2021 07:01  -  18 Feb 2021 20:40  --------------------------------------------------------  IN: 1300 mL / OUT: 250 mL / NET: 1050 mL

## 2021-02-18 NOTE — H&P ADULT - ASSESSMENT
60M from home, walks with special boots,  now at bed rest with PMH of DM(not on meds), HLD, CKD, S/P  5th toe R foot amputation in March 2020 along with foot ulcer is admitted now  for surgical repair for the Charcot foot.  Pt admitted to medicine floor for Post OP monitoring and pain medications.

## 2021-02-19 ENCOUNTER — TRANSCRIPTION ENCOUNTER (OUTPATIENT)
Age: 61
End: 2021-02-19

## 2021-02-19 LAB
A1C WITH ESTIMATED AVERAGE GLUCOSE RESULT: 6.2 % — HIGH (ref 4–5.6)
ALBUMIN SERPL ELPH-MCNC: 2.9 G/DL — LOW (ref 3.5–5)
ALBUMIN SERPL ELPH-MCNC: 3.1 G/DL — LOW (ref 3.5–5)
ALP SERPL-CCNC: 129 U/L — HIGH (ref 40–120)
ALP SERPL-CCNC: 131 U/L — HIGH (ref 40–120)
ALT FLD-CCNC: 34 U/L DA — SIGNIFICANT CHANGE UP (ref 10–60)
ALT FLD-CCNC: 36 U/L DA — SIGNIFICANT CHANGE UP (ref 10–60)
ANION GAP SERPL CALC-SCNC: 7 MMOL/L — SIGNIFICANT CHANGE UP (ref 5–17)
ANION GAP SERPL CALC-SCNC: 8 MMOL/L — SIGNIFICANT CHANGE UP (ref 5–17)
AST SERPL-CCNC: 15 U/L — SIGNIFICANT CHANGE UP (ref 10–40)
AST SERPL-CCNC: 17 U/L — SIGNIFICANT CHANGE UP (ref 10–40)
BASOPHILS # BLD AUTO: 0.02 K/UL — SIGNIFICANT CHANGE UP (ref 0–0.2)
BASOPHILS NFR BLD AUTO: 0.2 % — SIGNIFICANT CHANGE UP (ref 0–2)
BILIRUB SERPL-MCNC: 0.3 MG/DL — SIGNIFICANT CHANGE UP (ref 0.2–1.2)
BILIRUB SERPL-MCNC: 0.4 MG/DL — SIGNIFICANT CHANGE UP (ref 0.2–1.2)
BUN SERPL-MCNC: 37 MG/DL — HIGH (ref 7–18)
BUN SERPL-MCNC: 41 MG/DL — HIGH (ref 7–18)
CALCIUM SERPL-MCNC: 8.8 MG/DL — SIGNIFICANT CHANGE UP (ref 8.4–10.5)
CALCIUM SERPL-MCNC: 8.8 MG/DL — SIGNIFICANT CHANGE UP (ref 8.4–10.5)
CHLORIDE SERPL-SCNC: 107 MMOL/L — SIGNIFICANT CHANGE UP (ref 96–108)
CHLORIDE SERPL-SCNC: 108 MMOL/L — SIGNIFICANT CHANGE UP (ref 96–108)
CHOLEST SERPL-MCNC: 102 MG/DL — SIGNIFICANT CHANGE UP
CO2 SERPL-SCNC: 24 MMOL/L — SIGNIFICANT CHANGE UP (ref 22–31)
CO2 SERPL-SCNC: 24 MMOL/L — SIGNIFICANT CHANGE UP (ref 22–31)
CREAT ?TM UR-MCNC: 169 MG/DL — SIGNIFICANT CHANGE UP
CREAT SERPL-MCNC: 1.54 MG/DL — HIGH (ref 0.5–1.3)
CREAT SERPL-MCNC: 1.6 MG/DL — HIGH (ref 0.5–1.3)
EOSINOPHIL # BLD AUTO: 0.09 K/UL — SIGNIFICANT CHANGE UP (ref 0–0.5)
EOSINOPHIL NFR BLD AUTO: 1 % — SIGNIFICANT CHANGE UP (ref 0–6)
ESTIMATED AVERAGE GLUCOSE: 131 MG/DL — HIGH (ref 68–114)
GLUCOSE BLDC GLUCOMTR-MCNC: 102 MG/DL — HIGH (ref 70–99)
GLUCOSE BLDC GLUCOMTR-MCNC: 167 MG/DL — HIGH (ref 70–99)
GLUCOSE BLDC GLUCOMTR-MCNC: 167 MG/DL — HIGH (ref 70–99)
GLUCOSE BLDC GLUCOMTR-MCNC: 176 MG/DL — HIGH (ref 70–99)
GLUCOSE SERPL-MCNC: 81 MG/DL — SIGNIFICANT CHANGE UP (ref 70–99)
GLUCOSE SERPL-MCNC: 94 MG/DL — SIGNIFICANT CHANGE UP (ref 70–99)
HCT VFR BLD CALC: 29.6 % — LOW (ref 39–50)
HCT VFR BLD CALC: 30.7 % — LOW (ref 39–50)
HDLC SERPL-MCNC: 42 MG/DL — SIGNIFICANT CHANGE UP
HGB BLD-MCNC: 9.3 G/DL — LOW (ref 13–17)
HGB BLD-MCNC: 9.7 G/DL — LOW (ref 13–17)
IMM GRANULOCYTES NFR BLD AUTO: 0.3 % — SIGNIFICANT CHANGE UP (ref 0–1.5)
LIPID PNL WITH DIRECT LDL SERPL: 39 MG/DL — SIGNIFICANT CHANGE UP
LYMPHOCYTES # BLD AUTO: 1.13 K/UL — SIGNIFICANT CHANGE UP (ref 1–3.3)
LYMPHOCYTES # BLD AUTO: 12 % — LOW (ref 13–44)
MAGNESIUM SERPL-MCNC: 1.9 MG/DL — SIGNIFICANT CHANGE UP (ref 1.6–2.6)
MCHC RBC-ENTMCNC: 28.7 PG — SIGNIFICANT CHANGE UP (ref 27–34)
MCHC RBC-ENTMCNC: 28.8 PG — SIGNIFICANT CHANGE UP (ref 27–34)
MCHC RBC-ENTMCNC: 31.4 GM/DL — LOW (ref 32–36)
MCHC RBC-ENTMCNC: 31.6 GM/DL — LOW (ref 32–36)
MCV RBC AUTO: 90.8 FL — SIGNIFICANT CHANGE UP (ref 80–100)
MCV RBC AUTO: 91.6 FL — SIGNIFICANT CHANGE UP (ref 80–100)
MONOCYTES # BLD AUTO: 0.95 K/UL — HIGH (ref 0–0.9)
MONOCYTES NFR BLD AUTO: 10.1 % — SIGNIFICANT CHANGE UP (ref 2–14)
NEUTROPHILS # BLD AUTO: 7.19 K/UL — SIGNIFICANT CHANGE UP (ref 1.8–7.4)
NEUTROPHILS NFR BLD AUTO: 76.4 % — SIGNIFICANT CHANGE UP (ref 43–77)
NON HDL CHOLESTEROL: 60 MG/DL — SIGNIFICANT CHANGE UP
NRBC # BLD: 0 /100 WBCS — SIGNIFICANT CHANGE UP (ref 0–0)
NRBC # BLD: 0 /100 WBCS — SIGNIFICANT CHANGE UP (ref 0–0)
OSMOLALITY UR: 672 MOS/KG — SIGNIFICANT CHANGE UP (ref 50–1200)
PHOSPHATE SERPL-MCNC: 4.1 MG/DL — SIGNIFICANT CHANGE UP (ref 2.5–4.5)
PLATELET # BLD AUTO: 178 K/UL — SIGNIFICANT CHANGE UP (ref 150–400)
PLATELET # BLD AUTO: 182 K/UL — SIGNIFICANT CHANGE UP (ref 150–400)
POTASSIUM SERPL-MCNC: 4.6 MMOL/L — SIGNIFICANT CHANGE UP (ref 3.5–5.3)
POTASSIUM SERPL-MCNC: 4.7 MMOL/L — SIGNIFICANT CHANGE UP (ref 3.5–5.3)
POTASSIUM SERPL-SCNC: 4.6 MMOL/L — SIGNIFICANT CHANGE UP (ref 3.5–5.3)
POTASSIUM SERPL-SCNC: 4.7 MMOL/L — SIGNIFICANT CHANGE UP (ref 3.5–5.3)
PROT SERPL-MCNC: 6.8 G/DL — SIGNIFICANT CHANGE UP (ref 6–8.3)
PROT SERPL-MCNC: 7 G/DL — SIGNIFICANT CHANGE UP (ref 6–8.3)
RBC # BLD: 3.23 M/UL — LOW (ref 4.2–5.8)
RBC # BLD: 3.38 M/UL — LOW (ref 4.2–5.8)
RBC # FLD: 13.2 % — SIGNIFICANT CHANGE UP (ref 10.3–14.5)
RBC # FLD: 13.2 % — SIGNIFICANT CHANGE UP (ref 10.3–14.5)
SARS-COV-2 RNA SPEC QL NAA+PROBE: SIGNIFICANT CHANGE UP
SODIUM SERPL-SCNC: 139 MMOL/L — SIGNIFICANT CHANGE UP (ref 135–145)
SODIUM SERPL-SCNC: 139 MMOL/L — SIGNIFICANT CHANGE UP (ref 135–145)
SODIUM UR-SCNC: 29 MMOL/L — SIGNIFICANT CHANGE UP
TRIGL SERPL-MCNC: 103 MG/DL — SIGNIFICANT CHANGE UP
VIT D25+D1,25 OH+D1,25 PNL SERPL-MCNC: 11.7 PG/ML — LOW (ref 19.9–79.3)
WBC # BLD: 12.1 K/UL — HIGH (ref 3.8–10.5)
WBC # BLD: 9.41 K/UL — SIGNIFICANT CHANGE UP (ref 3.8–10.5)
WBC # FLD AUTO: 12.1 K/UL — HIGH (ref 3.8–10.5)
WBC # FLD AUTO: 9.41 K/UL — SIGNIFICANT CHANGE UP (ref 3.8–10.5)

## 2021-02-19 PROCEDURE — 99232 SBSQ HOSP IP/OBS MODERATE 35: CPT | Mod: GC

## 2021-02-19 RX ORDER — SENNA PLUS 8.6 MG/1
2 TABLET ORAL AT BEDTIME
Refills: 0 | Status: DISCONTINUED | OUTPATIENT
Start: 2021-02-19 | End: 2021-02-22

## 2021-02-19 RX ORDER — ENOXAPARIN SODIUM 100 MG/ML
40 INJECTION SUBCUTANEOUS DAILY
Refills: 0 | Status: DISCONTINUED | OUTPATIENT
Start: 2021-02-19 | End: 2021-02-22

## 2021-02-19 RX ORDER — POLYETHYLENE GLYCOL 3350 17 G/17G
17 POWDER, FOR SOLUTION ORAL DAILY
Refills: 0 | Status: DISCONTINUED | OUTPATIENT
Start: 2021-02-19 | End: 2021-02-22

## 2021-02-19 RX ADMIN — OXYCODONE AND ACETAMINOPHEN 1 TABLET(S): 5; 325 TABLET ORAL at 21:14

## 2021-02-19 RX ADMIN — ATORVASTATIN CALCIUM 20 MILLIGRAM(S): 80 TABLET, FILM COATED ORAL at 21:14

## 2021-02-19 RX ADMIN — SODIUM CHLORIDE 3 MILLILITER(S): 9 INJECTION INTRAMUSCULAR; INTRAVENOUS; SUBCUTANEOUS at 12:23

## 2021-02-19 RX ADMIN — Medication 1 MILLIGRAM(S): at 10:40

## 2021-02-19 RX ADMIN — PANTOPRAZOLE SODIUM 40 MILLIGRAM(S): 20 TABLET, DELAYED RELEASE ORAL at 06:02

## 2021-02-19 RX ADMIN — OXYCODONE AND ACETAMINOPHEN 1 TABLET(S): 5; 325 TABLET ORAL at 10:40

## 2021-02-19 RX ADMIN — SODIUM CHLORIDE 3 MILLILITER(S): 9 INJECTION INTRAMUSCULAR; INTRAVENOUS; SUBCUTANEOUS at 21:15

## 2021-02-19 RX ADMIN — SENNA PLUS 2 TABLET(S): 8.6 TABLET ORAL at 21:14

## 2021-02-19 RX ADMIN — Medication 1: at 11:58

## 2021-02-19 RX ADMIN — Medication 1: at 17:25

## 2021-02-19 RX ADMIN — SODIUM CHLORIDE 3 MILLILITER(S): 9 INJECTION INTRAMUSCULAR; INTRAVENOUS; SUBCUTANEOUS at 06:02

## 2021-02-19 NOTE — DISCHARGE NOTE PROVIDER - NSDCPNSUBOBJ_GEN_ALL_CORE
Patient is a 60y old  Male who presents with a chief complaint of Charcot foot repair (2021 10:25)    Patient was seen and examined at bedside today  Denies any complains  Afebrile overnight      INTERVAL HPI/OVERNIGHT EVENTS:  T(C): 36.7 (21 @ 13:59), Max: 37 (21 @ 20:16)  HR: 80 (21 @ 13:59) (80 - 84)  BP: 100/58 (21 @ 13:59) (100/58 - 127/77)  RR: 17 (21 @ 13:59) (17 - 18)  SpO2: 100% (21 @ 13:59) (97% - 100%)  Wt(kg): --  I&O's Summary      REVIEW OF SYSTEMS: denies fever, chills, SOB, palpitations, chest pain, abdominal pain, nausea, vomiting, diarrhea, constipation, dizziness    MEDICATIONS  (STANDING):  atorvastatin 20 milliGRAM(s) Oral at bedtime  enoxaparin Injectable 40 milliGRAM(s) SubCutaneous daily  folic acid 1 milliGRAM(s) Oral daily  insulin lispro (ADMELOG) corrective regimen sliding scale   SubCutaneous three times a day before meals  pantoprazole    Tablet 40 milliGRAM(s) Oral before breakfast  polyethylene glycol 3350 17 Gram(s) Oral daily  senna 2 Tablet(s) Oral at bedtime  sodium chloride 0.9% lock flush 3 milliLiter(s) IV Push every 8 hours    MEDICATIONS  (PRN):  acetaminophen   Tablet .. 650 milliGRAM(s) Oral every 6 hours PRN Temp greater or equal to 38C (100.4F), Mild Pain (1 - 3)  oxycodone    5 mG/acetaminophen 325 mG 1 Tablet(s) Oral every 6 hours PRN Severe Pain (7 - 10)      PHYSICAL EXAM:  GENERAL: NAD, well-groomed, well-developed  NERVOUS SYSTEM:  Alert & Oriented X3, Good concentration; Motor Strength 5/5 B/L upper and lower extremities  CHEST/LUNG: Clear to auscultation bilaterally; No rales, rhonchi, wheezing, or rubs  HEART: Regular rate and rhythm; No murmurs, rubs, or gallops  ABDOMEN: Soft, Nontender, Nondistended; Bowel sounds present  EXTREMITIES: LLE bandage clean, dry and intact, 2+ Peripheral Pulses, No clubbing, cyanosis, or edema  SKIN: No rashes or lesions    LABS:                        8.8    10.43 )-----------( 217      ( 2021 07:46 )             27.3     135  |  104  |  22<H>  ----------------------------<  149<H>  4.3   |  23  |  1.32<H>    Ca    9.2      2021 07:46        Urinalysis Basic - ( 2021 11:21 )    Color: Yellow / Appearance: Clear / S.015 / pH: x  Gluc: x / Ketone: Negative  / Bili: Negative / Urobili: Negative   Blood: x / Protein: 100 / Nitrite: Negative   Leuk Esterase: Negative / RBC: 2-5 /HPF / WBC 0-2 /HPF   Sq Epi: x / Non Sq Epi: Few /HPF / Bacteria: Few /HPF      CAPILLARY BLOOD GLUCOSE      POCT Blood Glucose.: 162 mg/dL (2021 11:38)  POCT Blood Glucose.: 153 mg/dL (2021 07:30)  POCT Blood Glucose.: 138 mg/dL (2021 20:58)  POCT Blood Glucose.: 150 mg/dL (2021 16:20)    A/P:  Post -operative fever- resolved and leukocytosis after Charcot's reconstructive surgery on   DM  CKD  HLD    Plan:   No signs of infection, surgical wound is healthy, does not need antibiotics. Appreciate Dr Eugene's consult  Will cont current pain mx with bowel regimen  Wound care as per podiatry   PT eval home with home PT   Stable to be discharged home

## 2021-02-19 NOTE — DISCHARGE NOTE PROVIDER - CARE PROVIDER_API CALL
Clemente Eng (DPM)  Foot Surgery; Recon RearfootAnkle Surgery  [address] [place] [address]  Phone: [phone]  Fax: [phone]  Follow Up Time:

## 2021-02-19 NOTE — PHYSICAL THERAPY INITIAL EVALUATION ADULT - PERTINENT HX OF CURRENT PROBLEM, REHAB EVAL
Patient s/p medial column fusion,STJ fusion terell
h/o DM, diabetic ulcer of right foot; amputation of right 5th toe; and achilles tendon repair

## 2021-02-19 NOTE — PROGRESS NOTE ADULT - PROBLEM SELECTOR PLAN 4
Pt has PMH of CKD  Baseline Cr as per OP lab work up from 2/17  Cr . 1.37   f/u bmp and urine lytes in am  avoid nephrotoxic drugs - Pt has PMH of CKD  - Baseline Cr as per OP lab work up from 2/17  Cr . 1.37   - FeNa 0.2%, prerenal, improving with oral hydration  - avoid nephrotoxic drugs

## 2021-02-19 NOTE — PHYSICAL THERAPY INITIAL EVALUATION ADULT - PATIENT PROFILE REVIEW, REHAB EVAL
yes
PRE-OP PT assessment/education done as discussed with Podiatry PGY-1 Dr. Viramontes; EMR, labs, radiology and imaging reports reviewed/yes

## 2021-02-19 NOTE — PHYSICAL THERAPY INITIAL EVALUATION ADULT - DIAGNOSIS, PT EVAL
patient education performed on gait strategies, NWB on left LE, and proper utilization of rolling walker for ambulation and transfers
Patient was given ambulation training with MAIRA and NWB L LE

## 2021-02-19 NOTE — PROGRESS NOTE ADULT - SUBJECTIVE AND OBJECTIVE BOX
Podiatry progress: 6o y/o male s/p TN,STJ fusion 2/18/21. Patient seen resting comfortably in bed, eating breakfast. Patient denies pain on LLE. Patient denies fever, chills, nausea, vomiting ,SOB.He denies any pain on his calves.  `Medications atorvastatin 20 milliGRAM(s) Oral at bedtime  folic acid 1 milliGRAM(s) Oral daily  insulin lispro (ADMELOG) corrective regimen sliding scale   SubCutaneous three times a day before meals  oxycodone    5 mG/acetaminophen 325 mG 1 Tablet(s) Oral every 6 hours PRN  pantoprazole    Tablet 40 milliGRAM(s) Oral before breakfast  polyethylene glycol 3350 17 Gram(s) Oral daily  senna 2 Tablet(s) Oral at bedtime  sodium chloride 0.9% lock flush 3 milliLiter(s) IV Push every 8 hours    FHKnown health problems: none    No pertinent family history in first degree relatives    ,   PMHCharcot&#x27;s joint, left ankle and foot    Chronic GERD    Diabetic ulcer of right foot    Hyperlipidemia    Diabetes    No pertinent past medical history       PSHH/O amputation of lesser toe    H/O amputation of lesser toe, left    H/O Achilles tendon repair    No significant past surgical history        Labs                          9.7    9.41  )-----------( 178      ( 19 Feb 2021 07:06 )             30.7      02-19    139  |  107  |  37<H>  ----------------------------<  94  4.7   |  24  |  1.54<H>    Ca    8.8      19 Feb 2021 07:06  Phos  4.1     02-19  Mg     1.9     02-19    TPro  7.0  /  Alb  3.1<L>  /  TBili  0.4  /  DBili  x   /  AST  17  /  ALT  34  /  AlkPhos  131<H>  02-19     Vital Signs Last 24 Hrs  T(C): 37 (19 Feb 2021 05:30), Max: 37 (18 Feb 2021 19:26)  T(F): 98.6 (19 Feb 2021 05:30), Max: 98.6 (18 Feb 2021 19:26)  HR: 90 (19 Feb 2021 05:30) (72 - 96)  BP: 118/71 (19 Feb 2021 05:30) (102/62 - 118/71)  BP(mean): 87 (18 Feb 2021 20:11) (85 - 87)  RR: 18 (19 Feb 2021 05:30) (13 - 18)  SpO2: 99% (19 Feb 2021 05:30) (97% - 100%)          WBC Count: 9.41 K/uL (02-19-21 @ 07:06)  WBC Count: 12.10 K/uL <H> (02-19-21 @ 00:48)  ---------------------------------------------------------------------------------------------------------  Physical exam Left foot:  Vasc: CFT < 3 sec x5,no erythema,No edema  Neuro: protective sensation intact  MSK: patient was able to wiggle his toes Podiatry progress: 6o y/o male s/p medial column fusion,STJ fusion,CHRIS  2/18/21. Patient seen resting comfortably in bed, eating breakfast. Patient denies pain on LLE. Patient denies fever, chills, nausea, vomiting ,SOB.He denies any pain on his calves.  `Medications atorvastatin 20 milliGRAM(s) Oral at bedtime  folic acid 1 milliGRAM(s) Oral daily  insulin lispro (ADMELOG) corrective regimen sliding scale   SubCutaneous three times a day before meals  oxycodone    5 mG/acetaminophen 325 mG 1 Tablet(s) Oral every 6 hours PRN  pantoprazole    Tablet 40 milliGRAM(s) Oral before breakfast  polyethylene glycol 3350 17 Gram(s) Oral daily  senna 2 Tablet(s) Oral at bedtime  sodium chloride 0.9% lock flush 3 milliLiter(s) IV Push every 8 hours    FHKnown health problems: none    No pertinent family history in first degree relatives    ,   PMHCharcot&#x27;s joint, left ankle and foot    Chronic GERD    Diabetic ulcer of right foot    Hyperlipidemia    Diabetes    No pertinent past medical history       PSHH/O amputation of lesser toe    H/O amputation of lesser toe, left    H/O Achilles tendon repair    No significant past surgical history        Labs                          9.7    9.41  )-----------( 178      ( 19 Feb 2021 07:06 )             30.7      02-19    139  |  107  |  37<H>  ----------------------------<  94  4.7   |  24  |  1.54<H>    Ca    8.8      19 Feb 2021 07:06  Phos  4.1     02-19  Mg     1.9     02-19    TPro  7.0  /  Alb  3.1<L>  /  TBili  0.4  /  DBili  x   /  AST  17  /  ALT  34  /  AlkPhos  131<H>  02-19     Vital Signs Last 24 Hrs  T(C): 37 (19 Feb 2021 05:30), Max: 37 (18 Feb 2021 19:26)  T(F): 98.6 (19 Feb 2021 05:30), Max: 98.6 (18 Feb 2021 19:26)  HR: 90 (19 Feb 2021 05:30) (72 - 96)  BP: 118/71 (19 Feb 2021 05:30) (102/62 - 118/71)  BP(mean): 87 (18 Feb 2021 20:11) (85 - 87)  RR: 18 (19 Feb 2021 05:30) (13 - 18)  SpO2: 99% (19 Feb 2021 05:30) (97% - 100%)          WBC Count: 9.41 K/uL (02-19-21 @ 07:06)  WBC Count: 12.10 K/uL <H> (02-19-21 @ 00:48)  ---------------------------------------------------------------------------------------------------------  Physical exam Left foot:  Vasc: CFT < 3 sec x5,no erythema,No edema  Neuro: protective sensation intact  MSK: patient was able to wiggle his toes

## 2021-02-19 NOTE — DISCHARGE NOTE PROVIDER - NSDCMRMEDTOKEN_GEN_ALL_CORE_FT
folic acid 1 mg oral tablet: 1 tab(s) orally once a day  Lipitor 20 mg oral tablet: 1 tab(s) orally once a day (at bedtime)   acetaminophen 325 mg oral tablet: 2 tab(s) orally every 6 hours, As needed, Temp greater or equal to 38C (100.4F), Mild Pain (1 - 3)  ergocalciferol 50,000 intl units (1.25 mg) oral capsule: 1 cap(s) orally once a week for 7 weeks. Starting 3.1.21  folic acid 1 mg oral tablet: 1 tab(s) orally once a day  Lipitor 20 mg oral tablet: 1 tab(s) orally once a day (at bedtime)  oxycodone-acetaminophen 5 mg-325 mg oral tablet: 1 tab(s) orally every 6 hours, As needed, Severe Pain (7 - 10) MDD:4 tabs per day

## 2021-02-19 NOTE — PHYSICAL THERAPY INITIAL EVALUATION ADULT - LIVES WITH, PROFILE
in a 7th floor apartment with no stairs to negotiate; has elevator access/alone
in an apt; elevator building/alone

## 2021-02-19 NOTE — PROGRESS NOTE ADULT - PROBLEM SELECTOR PLAN 3
Pt has PMH of HLD   Home meds- lipitor 20mg  c/w home meds  f/u lipid profile - Pt has PMH of HLD   - Home meds- lipitor 20mg  - C/w home meds  - Lipid profile wnl

## 2021-02-19 NOTE — PHYSICAL THERAPY INITIAL EVALUATION ADULT - NS ASR WT BEARING DETAIL LLE
as per discussion with PGY-1 Dr. Viramontes as likely weight-bearing status s/p left foot surgery/nonweight-bearing
nonweight-bearing

## 2021-02-19 NOTE — DISCHARGE NOTE PROVIDER - HOSPITAL COURSE
HPI:  60M from home, walks with special boots,  now at bed rest with PMH of DM(not on meds), HLD, CKD, S/P  5th toe R foot amputation in March 2020 along with foot ulcer is admitted now  for surgical repair for the Charcot foot. Pt was hospitalized here in June 2020 and had  undergone surgery had R healed foot ulcer. Since last year, Pt noted gradual onset of swellling and pain in the L foot and  found it difficult to ambulate with left foot . Pt follows   OP and was recommended to get a surgical repair. Pt had undergone  Tendoachilles lengthening, Subtalar joint fusion, Talonavicular fusion, NC fusion, and 1st met cuneiform fusion with application of posterior splint today 2/18  by the Podiatry team. Pt also endorses mild L shoulder pian  Pt has DM but mentions that has BS has been in control since a while and is currently OFF all medication( earlier on insulin and metformin).   Pt admitted to medicine floor for Post OP monitoring and pain medications.   Pt had quit smoking 2 yrs ago, takes alcohol occasionally and had had quit cocaine/trimble "many" year ago.     Off note- Pt has labs from OP 2/17 that were within normal limits, Cr  1.37     Pt was seen and evaluated in the PACU   POD# 0   Vitals- 115/78, hr 70, AFEBRILE, spo2- 100% RA  L foot xray- ChrAcot joint   RBS 68      (18 Feb 2021 20:01)    60M from home PMHx of DM (not on meds), HLD, CKD, S/P R foot 5th toe amputation in March 2020 along with foot ulcer was admitted for surgical repair for the Charcot foot. Pt was hospitalized in June 2020 and had undergone surgery with R healed foot ulcer. Since last year, Pt noted gradual onset of swelling and pain in the L foot; he found it difficult to ambulate. Pt follows with podiatry Dr. Eng OP and he underwent Tendoachilles lengthening, Subtalar joint fusion, Talonavicular fusion, NC fusion, and 1st met cuneiform fusion with application of posterior splint 2/18. Pt endorses mild L shoulder pain but reports that he has OA, and is following with orthopedics for further management. Pt was admitted to medicine floor for post operative monitoring and pain medications. Pt was observed for 24 hours post procedure, labs were evaluated to be wnl, and pain control was adequate. He was evaluated by PT and found to have some impairments for which he can go home with home PT. At discharge he had passed BM, and wound dressing was clean with no drainage.  60M from home PMHx of DM (not on meds), HLD, CKD, S/P R foot 5th toe amputation in March 2020 along with foot ulcer was admitted for surgical repair for the Charcot foot. Pt was hospitalized in June 2020 and had undergone surgery with R healed foot ulcer. Since last year, Pt noted gradual onset of swelling and pain in the L foot; he found it difficult to ambulate. Pt follows with podiatry Dr. Eng OP and he underwent Tendoachilles lengthening, Subtalar joint fusion, Talonavicular fusion, NC fusion, and 1st met cuneiform fusion with application of posterior splint 2/18. Pt endorses mild L shoulder pain but reports that he has OA, and is following with orthopedics for further management. Pt was admitted to medicine floor for post operative monitoring and pain medications. Pt was observed for 24 hours post procedure, labs were evaluated to be wnl, and pain control was adequate. He was evaluated by PT and found to have some impairments for which he can go home with home PT. At discharge he had passed BM, and wound dressing was clean with no drainage. Patient has been afebrile for 24hrs and has been cleared from ID standpoint by Dr. Eugene. Pt is stable for discharge. Pt has been advised to follow up as outpatient. Case has ang discussed with the attending. This is just a summary of the case. For further information please refer to pt. chart document.  60M from home PMHx of DM (not on meds), HLD, CKD, S/P R foot 5th toe amputation in March 2020 along with foot ulcer was admitted for surgical repair for the Charcot foot. Pt was hospitalized in June 2020 and had undergone surgery with R healed foot ulcer. Since last year, Pt noted gradual onset of swelling and pain in the L foot; he found it difficult to ambulate. Pt follows with podiatry Dr. Eng OP and he underwent Tendoachilles lengthening, Subtalar joint fusion, Talonavicular fusion, NC fusion, and 1st met cuneiform fusion with application of posterior splint 2/18. Pt endorses mild L shoulder pain but reports that he has OA, and is following with orthopedics for further management. Pt was admitted to medicine floor for post operative monitoring and pain medications. Pt was observed for 24 hours post procedure, labs were evaluated to be wnl, and pain control was adequate. He was evaluated by PT and found to have some impairments for which he can go home with home PT. At discharge he had passed BM, and wound dressing was clean with no drainage. Patient has been afebrile for 24hrs and has been cleared from ID standpoint by Dr. Eugene. Pt is stable for discharge. Pt has been advised to follow up as outpatient. Case has ang discussed with the attending. This is just a summary of the case. For further information please refer to pt.  chart document.  60M from home PMHx of DM (not on meds), HLD, CKD, S/P R foot 5th toe amputation in March 2020 along with foot ulcer was admitted for surgical repair for the Charcot foot. Pt was hospitalized in June 2020 and had undergone surgery with R healed foot ulcer. Since last year, Pt noted gradual onset of swelling and pain in the L foot; he found it difficult to ambulate. Pt follows with podiatry Dr. Eng OP and he underwent Tendoachilles lengthening, Subtalar joint fusion, Talonavicular fusion, NC fusion, and 1st met cuneiform fusion with application of posterior splint 2/18. Pt endorses mild L shoulder pain but reports that he has OA, and is following with orthopedics for further management. Pt was admitted to medicine floor for post operative monitoring and pain medications. Pt was observed post procedure, labs were evaluated to be wnl, and pain control was adequate. He was evaluated by PT and found to have some impairments for which he can go home with home PT. At discharge he had passed BM, and wound dressing was clean with no drainage. Patient received Vancomycin and Zosyn one dose for fever spike. Patient has been afebrile for 24hrs and has been cleared from ID standpoint by Dr. Eugene. Pt is stable for discharge. Pt has been advised to follow up as outpatient. Case has ang discussed with the attending. This is just a summary of the case. For further information please refer to pt.  chart document.

## 2021-02-19 NOTE — PROGRESS NOTE ADULT - PROBLEM SELECTOR PLAN 2
Pt has PMH of DM   As per Pt, his BS were noted to be controlled over past few months and is off all meds  Pt had stopped insulin 1 yr ago and Metformin 2 months ago  RBS- 86  C/W HSS  F/U A1C and FS - Pt has PMHx of DM previously on metformin/ insulin 2 mo's ago, not currently  - RBS- 86  - C/W HSS  - F/U A1C and FS - Pt has PMHx of DM previously on metformin/ insulin 2 mo's ago, not currently  - RBS- 86  - C/W HSS  - A1C 6.2 - prediabetic, will need metformin on d/c, PCP follow up

## 2021-02-19 NOTE — PROGRESS NOTE ADULT - SUBJECTIVE AND OBJECTIVE BOX
PGY-1 Progress Note discussed with attending    PAGER #: [[phone]] TILL 5:00 PM  PLEASE CONTACT ON CALL TEAM:  - On Call Team (Please refer to Darius) FROM 5:00 PM - 8:30PM  - Nightfloat Team FROM 8:30 -7:30 AM    CHIEF COMPLAINT & BRIEF HOSPITAL COURSE:    INTERVAL HPI/OVERNIGHT EVENTS:       REVIEW OF SYSTEMS:  CONSTITUTIONAL: No fever, weight loss, or fatigue  RESPIRATORY: No cough, wheezing, chills or hemoptysis; No shortness of breath  CARDIOVASCULAR: No chest pain, palpitations, dizziness, or leg swelling  GASTROINTESTINAL: No abdominal pain. No nausea, vomiting, or hematemesis; No diarrhea or constipation. No melena or hematochezia.  GENITOURINARY: No dysuria or hematuria, urinary frequency  NEUROLOGICAL: No headaches, memory loss, loss of strength, numbness, or tremors  SKIN: No itching, burning, rashes, or lesions     MEDICATIONS  (STANDING):  atorvastatin 20 milliGRAM(s) Oral at bedtime  folic acid 1 milliGRAM(s) Oral daily  insulin lispro (ADMELOG) corrective regimen sliding scale   SubCutaneous three times a day before meals  pantoprazole    Tablet 40 milliGRAM(s) Oral before breakfast  polyethylene glycol 3350 17 Gram(s) Oral daily  senna 2 Tablet(s) Oral at bedtime  sodium chloride 0.9% lock flush 3 milliLiter(s) IV Push every 8 hours    MEDICATIONS  (PRN):  oxycodone    5 mG/acetaminophen 325 mG 1 Tablet(s) Oral every 6 hours PRN Severe Pain (7 - 10)      Vital Signs Last 24 Hrs  T(C): 37 (19 Feb 2021 05:30), Max: 37 (18 Feb 2021 19:26)  T(F): 98.6 (19 Feb 2021 05:30), Max: 98.6 (18 Feb 2021 19:26)  HR: 90 (19 Feb 2021 05:30) (72 - 96)  BP: 118/71 (19 Feb 2021 05:30) (102/62 - 118/71)  BP(mean): 87 (18 Feb 2021 20:11) (85 - 87)  RR: 18 (19 Feb 2021 05:30) (13 - 18)  SpO2: 99% (19 Feb 2021 05:30) (97% - 100%)    PHYSICAL EXAMINATION:  GENERAL: NAD, well built  HEAD:  Atraumatic, Normocephalic  EYES:  conjunctiva and sclera clear  NECK: Supple, No JVD, Normal thyroid  CHEST/LUNG: Clear to auscultation. Clear to percussion bilaterally; No rales, rhonchi, wheezing, or rubs  HEART: Regular rate and rhythm; No murmurs, rubs, or gallops  ABDOMEN: Soft, Nontender, Nondistended; Bowel sounds present  NERVOUS SYSTEM:  Alert & Oriented X3,    EXTREMITIES:  2+ Peripheral Pulses, No clubbing, cyanosis, or edema  SKIN: warm dry                          9.7    9.41  )-----------( 178      ( 19 Feb 2021 07:06 )             30.7     02-19    139  |  107  |  37<H>  ----------------------------<  94  4.7   |  24  |  1.54<H>    Ca    8.8      19 Feb 2021 07:06  Phos  4.1     02-19  Mg     1.9     02-19    TPro  7.0  /  Alb  3.1<L>  /  TBili  0.4  /  DBili  x   /  AST  17  /  ALT  34  /  AlkPhos  131<H>  02-19    LIVER FUNCTIONS - ( 19 Feb 2021 07:06 )  Alb: 3.1 g/dL / Pro: 7.0 g/dL / ALK PHOS: 131 U/L / ALT: 34 U/L DA / AST: 17 U/L / GGT: x                   CAPILLARY BLOOD GLUCOSE      RADIOLOGY & ADDITIONAL TESTS:                   PGY-1 Progress Note discussed with attending    PAGER #: [[phone]] TILL 5:00 PM  PLEASE CONTACT ON CALL TEAM:  - On Call Team (Please refer to Darius) FROM 5:00 PM - 8:30PM  - Nightfloat Team FROM 8:30 -7:30 AM    CHIEF COMPLAINT & BRIEF HOSPITAL COURSE:  60M from home PMHx of DM (not on meds), HLD, CKD, S/P R foot 5th toe amputation in March 2020 along with foot ulcer was admitted for surgical repair for the Charcot foot. Pt was hospitalized in June 2020 and had undergone surgery with R healed foot ulcer. Since last year, Pt noted gradual onset of swelling and pain in the L foot; he found it difficult to ambulate. Pt follows with podiatry Dr. Eng OP and he underwent Tendoachilles lengthening, Subtalar joint fusion, Talonavicular fusion, NC fusion, and 1st met cuneiform fusion with application of posterior splint 2/18. Pt endorses mild L shoulder pain but reports that he has OA, and is following with orthopedics for further management. Pt was admitted to medicine floor for post operative monitoring and pain medications. Pt was observed for 24 hours post procedure, labs were evaluated to be wnl, and pain control was adequate. He was evaluated by PT and found to have some impairments for which he can go home with home PT. At discharge he had passed BM, and wound dressing was clean with no drainage.     INTERVAL HPI/OVERNIGHT EVENTS:   Patient was examined while he was lying in bed, Aox3, responding appropriately to questions, in NAD. He has not yet passed a BM since surgery, has normal bladder habits, and denies any other acute complaints.     REVIEW OF SYSTEMS:  CONSTITUTIONAL: No fever, weight loss, or fatigue  RESPIRATORY: No cough, wheezing, chills or hemoptysis; No shortness of breath  CARDIOVASCULAR: No chest pain, palpitations, dizziness, or leg swelling  GASTROINTESTINAL: No abdominal pain. No nausea, vomiting, or hematemesis; No diarrhea or constipation. No melena or hematochezia.  GENITOURINARY: No dysuria or hematuria, urinary frequency  NEUROLOGICAL: No headaches, memory loss, loss of strength, numbness, or tremors  SKIN: Left foot post op pain + No itching, burning, rashes, or lesions     MEDICATIONS  (STANDING):  atorvastatin 20 milliGRAM(s) Oral at bedtime  folic acid 1 milliGRAM(s) Oral daily  insulin lispro (ADMELOG) corrective regimen sliding scale   SubCutaneous three times a day before meals  pantoprazole    Tablet 40 milliGRAM(s) Oral before breakfast  polyethylene glycol 3350 17 Gram(s) Oral daily  senna 2 Tablet(s) Oral at bedtime  sodium chloride 0.9% lock flush 3 milliLiter(s) IV Push every 8 hours    MEDICATIONS  (PRN):  oxycodone    5 mG/acetaminophen 325 mG 1 Tablet(s) Oral every 6 hours PRN Severe Pain (7 - 10)      Vital Signs Last 24 Hrs  T(C): 37 (19 Feb 2021 05:30), Max: 37 (18 Feb 2021 19:26)  T(F): 98.6 (19 Feb 2021 05:30), Max: 98.6 (18 Feb 2021 19:26)  HR: 90 (19 Feb 2021 05:30) (72 - 96)  BP: 118/71 (19 Feb 2021 05:30) (102/62 - 118/71)  BP(mean): 87 (18 Feb 2021 20:11) (85 - 87)  RR: 18 (19 Feb 2021 05:30) (13 - 18)  SpO2: 99% (19 Feb 2021 05:30) (97% - 100%)    PHYSICAL EXAMINATION:  GENERAL: Well built  male, in NAD   HEAD:  Atraumatic, Normocephalic  EYES:  conjunctiva and sclera clear  NECK: Supple, No JVD, Normal thyroid  CHEST/LUNG: Clear to auscultation. Clear to percussion bilaterally; No rales, rhonchi, wheezing, or rubs  HEART: Regular rate and rhythm; No murmurs, rubs, or gallops  ABDOMEN: Soft, Nontender, Nondistended; Bowel sounds present  NERVOUS SYSTEM:  Alert & Oriented X3, No motor or sensory deficits   EXTREMITIES: Left foot wrapped in ACE/ splint, no discharge, wound dry 2+ Peripheral Pulses, No clubbing, cyanosis, or edema  SKIN: warm dry                          9.7    9.41  )-----------( 178      ( 19 Feb 2021 07:06 )             30.7     02-19    139  |  107  |  37<H>  ----------------------------<  94  4.7   |  24  |  1.54<H>    Ca    8.8      19 Feb 2021 07:06  Phos  4.1     02-19  Mg     1.9     02-19    TPro  7.0  /  Alb  3.1<L>  /  TBili  0.4  /  DBili  x   /  AST  17  /  ALT  34  /  AlkPhos  131<H>  02-19    LIVER FUNCTIONS - ( 19 Feb 2021 07:06 )  Alb: 3.1 g/dL / Pro: 7.0 g/dL / ALK PHOS: 131 U/L / ALT: 34 U/L DA / AST: 17 U/L / GGT: x                   CAPILLARY BLOOD GLUCOSE      RADIOLOGY & ADDITIONAL TESTS:

## 2021-02-19 NOTE — PHYSICAL THERAPY INITIAL EVALUATION ADULT - PATIENT/FAMILY/SIGNIFICANT OTHER GOALS STATEMENT, PT EVAL
return home; ambulate, transfer and perform ADLs independently without an assistive device
Patient stated that he feels ok

## 2021-02-19 NOTE — DISCHARGE NOTE PROVIDER - NSDCCPCAREPLAN_GEN_ALL_CORE_FT
PRINCIPAL DISCHARGE DIAGNOSIS  Diagnosis: Charcot's joint, left ankle and foot  Assessment and Plan of Treatment: You have history of Charcot's foot which is a condition that affects the musculoskeletal function of your foot with chronic changes and associated pain/ loss of function. You were evaluated by outpatient podiatry and sent to the hospital to have a subtalar fusion procedure. This was done uneventfully on 2/18 and you were monitored for 24 hours post-op. You did not have any acute complaints post -op, your pain was well managed, lab evaluation was wnl, and you were stable for follow up in a week with podiatry.      SECONDARY DISCHARGE DIAGNOSES  Diagnosis: Chronic kidney disease (CKD)  Assessment and Plan of Treatment: You were found to have a worsening of your kidney function at baseline, which we diagnosed by looking at your blood work values including BUN/ serum creratinine. Your baseline Serum Creatinine was found to be around 1.7- 2.0; you were monitored after restarted a blood pressure medication called lisinopril, but did not develop any kidney injury. You were treated with IV hydration, and your renal function improved back to your baseline. We held nephrotoxic medications such as NSAID pain killers, and were cautious about the use of IV contrast if such scans were needed. You are advised to continue to stay well hydrated, and to seek medical attention if you have painful/burning urination, blood in urine, or increasing inability to control the flow of urine.      Diagnosis: Diabetes  Assessment and Plan of Treatment: You have Type 2 diabetes and were found to have a Hemoglobin A1c of 6.6% which shows a pretty well controlled glucose levels in your body over the past 3 months. However, a normal HbA1c is 5.5%, and you must make some lifestyle choices such as exercise and weight loss in order to make your body less insulin resistant. You are advised to eat foods that are low glycemic index which include beans and complex carbs, and to avoid high GI foods including white rice and potatoes. Uncontrolled sugars can lead to blindness, kidney failure, and contribute to diseases such as heart attacks and strokes. Your were not started on any medications at this hospital visit, and you are advised to keep your Primary Care Physician appointments in order to monitor and change medications as necessary.      Diagnosis: Hyperlipidemia  Assessment and Plan of Treatment: You have a history of high blood fats and were on two medications at home including a STATIN and FENOFIBRATE. You were continued on the statin during this hospital admission. Your blood tests showed well controlled lipid levels in your body. You are advised to continue taking this medication daily and seek medical attention if you have intense muscle aches, or reddish discoloration of the urine.       PRINCIPAL DISCHARGE DIAGNOSIS  Diagnosis: Charcot's joint, left ankle and foot  Assessment and Plan of Treatment: You have history of Charcot's foot which is a condition that affects the musculoskeletal function of your foot with chronic changes and associated pain/ loss of function. You were evaluated by outpatient podiatry and sent to the hospital to have a subtalar fusion procedure. This was done uneventfully on 2/18 and you were monitored for 24 hours post-op. You did not have any acute complaints post -op, your pain was well managed, lab evaluation was wnl, and you were stable for follow up in a week with podiatry. PT evaluated you and found that you can benefit from Home with home PT.      SECONDARY DISCHARGE DIAGNOSES  Diagnosis: Chronic kidney disease (CKD)  Assessment and Plan of Treatment: You were found to have a worsening of your kidney function at baseline, which we diagnosed by looking at your blood work values including BUN/ serum creratinine. Your baseline Serum Creatinine was found to be around 1.7- 2.0; you were monitored after restarted a blood pressure medication called lisinopril, but did not develop any kidney injury. You were treated with IV hydration, and your renal function improved back to your baseline. We held nephrotoxic medications such as NSAID pain killers, and were cautious about the use of IV contrast if such scans were needed. You are advised to continue to stay well hydrated, and to seek medical attention if you have painful/burning urination, blood in urine, or increasing inability to control the flow of urine.      Diagnosis: Diabetes  Assessment and Plan of Treatment: You have Type 2 diabetes and were found to have a Hemoglobin A1c of 6.6% which shows a pretty well controlled glucose levels in your body over the past 3 months. However, a normal HbA1c is 5.5%, and you must make some lifestyle choices such as exercise and weight loss in order to make your body less insulin resistant. You are advised to eat foods that are low glycemic index which include beans and complex carbs, and to avoid high GI foods including white rice and potatoes. Uncontrolled sugars can lead to blindness, kidney failure, and contribute to diseases such as heart attacks and strokes. Your were not started on any medications at this hospital visit, and you are advised to keep your Primary Care Physician appointments in order to monitor and change medications as necessary.      Diagnosis: Hyperlipidemia  Assessment and Plan of Treatment: You have a history of high blood fats and were on two medications at home including a STATIN and FENOFIBRATE. You were continued on the statin during this hospital admission. Your blood tests showed well controlled lipid levels in your body. You are advised to continue taking this medication daily and seek medical attention if you have intense muscle aches, or reddish discoloration of the urine.       PRINCIPAL DISCHARGE DIAGNOSIS  Diagnosis: Charcot's joint, left ankle and foot  Assessment and Plan of Treatment: You have history of Charcot's foot which is a condition that affects the musculoskeletal function of your foot with chronic changes and associated pain/ loss of function. You were evaluated by outpatient podiatry and sent to the hospital to have a subtalar fusion procedure. This was done uneventfully on 2/18 and you were monitored for 24 hours post-op. You did not have any acute complaints post -op, your pain was well managed, lab evaluation was wnl, and you were stable for follow up in a week with podiatry. PT evaluated you and found that you can benefit from Home with home PT.      SECONDARY DISCHARGE DIAGNOSES  Diagnosis: Diabetes  Assessment and Plan of Treatment: You have Type 2 diabetes and were found to have a Hemoglobin A1c of 6.6% which shows a pretty well controlled glucose levels in your body over the past 3 months. However, a normal HbA1c is 5.5%, and you must make some lifestyle choices such as exercise and weight loss in order to make your body less insulin resistant. You are advised to eat foods that are low glycemic index which include beans and complex carbs, and to avoid high GI foods including white rice and potatoes. Uncontrolled sugars can lead to blindness, kidney failure, and contribute to diseases such as heart attacks and strokes. Your were not started on any medications at this hospital visit, and you are advised to keep your Primary Care Physician appointments in order to monitor and change medications as necessary.      Diagnosis: Chronic kidney disease (CKD)  Assessment and Plan of Treatment: You were found to have a worsening of your kidney function at baseline, which we diagnosed by looking at your blood work values including BUN/ serum creratinine. Your baseline Serum Creatinine was found to be around 1.7- 2.0; you were monitored after restarted a blood pressure medication called lisinopril, but did not develop any kidney injury. You were treated with IV hydration, and your renal function improved back to your baseline. We held nephrotoxic medications such as NSAID pain killers, and were cautious about the use of IV contrast if such scans were needed. You are advised to continue to stay well hydrated, and to seek medical attention if you have painful/burning urination, blood in urine, or increasing inability to control the flow of urine.      Diagnosis: Hyperlipidemia  Assessment and Plan of Treatment: You have a history of high blood fats and were on two medications at home including a STATIN and FENOFIBRATE. You were continued on the statin during this hospital admission. Your blood tests showed well controlled lipid levels in your body. You are advised to continue taking this medication daily and seek medical attention if you have intense muscle aches, or reddish discoloration of the urine.      Diagnosis: Vitamin D deficiency  Assessment and Plan of Treatment: Your Vitamin D level was found to be low. You received one dose of 50,000 units of vitamin D. You will continue to take Vitamin D 50,000 Units once a week on mondays, starting on 3/1/21. You will follow up with your PCP for monitoring of your VItamin D levels as out patient.     PRINCIPAL DISCHARGE DIAGNOSIS  Diagnosis: Charcot's joint, left ankle and foot  Assessment and Plan of Treatment: You have history of Charcot's foot which is a condition that affects the musculoskeletal function of your foot with chronic changes and associated pain/ loss of function. You were evaluated by outpatient podiatry and sent to the hospital to have a subtalar fusion procedure. This was done uneventfully on 2/18 and you were monitored for 24 hours post-op. You did not have any acute complaints post -op, your pain was well managed, lab evaluation was wnl, and you were stable for follow up in a week with podiatry. You had fever spike post -operatively but were fever free for >24 hours before discharge. PT evaluated you and found that you can benefit from Home with home PT.      SECONDARY DISCHARGE DIAGNOSES  Diagnosis: Vitamin D deficiency  Assessment and Plan of Treatment: Your Vitamin D level was found to be low. You received one dose of 50,000 units of vitamin D. You will continue to take Vitamin D 50,000 Units once a week on mondays, starting on 3/1/21. You will follow up with your PCP for monitoring of your VItamin D levels as out patient.    Diagnosis: Diabetes  Assessment and Plan of Treatment: You are advised to eat foods that are low glycemic index which include beans and complex carbs, and to avoid high GI foods including white rice and potatoes. Uncontrolled sugars can lead to blindness, kidney failure, and contribute to diseases such as heart attacks and strokes. Your were not started on any medications at this hospital visit, and you are advised to keep your Primary Care Physician appointments in order to monitor and change medications as necessary.      Diagnosis: Chronic kidney disease (CKD)  Assessment and Plan of Treatment: You were treated with IV hydration, and your renal function improved back to your baseline. We held nephrotoxic medications such as NSAID pain killers, and were cautious about the use of IV contrast if such scans were needed. You are advised to continue to stay well hydrated, and to seek medical attention if you have painful/burning urination, blood in urine, or increasing inability to control the flow of urine.      Diagnosis: Hyperlipidemia  Assessment and Plan of Treatment: You have a history of high blood fats and were on two medications at home including a STATIN and FENOFIBRATE. You were continued on the statin during this hospital admission. Your blood tests showed well controlled lipid levels in your body. You are advised to continue taking this medication daily and seek medical attention if you have intense muscle aches, or reddish discoloration of the urine.       PRINCIPAL DISCHARGE DIAGNOSIS  Diagnosis: Charcot's joint, left ankle and foot  Assessment and Plan of Treatment: You have history of Charcot's foot which is a condition that affects the musculoskeletal function of your foot with chronic changes and associated pain/ loss of function. You were evaluated by outpatient podiatry and sent to the hospital to have a subtalar fusion procedure. This was done uneventfully on 2/18 and you were monitored for 24 hours post-op. You did not have any acute complaints post -op, your pain was well managed, lab evaluation was wnl, and you were stable for follow up in a week with podiatry. You had fever spike post -operatively but were fever free for >24 hours before discharge. Infectious disease specialist evaluated you and saw no indication to start you on antibiotics. PT evaluated you and found that you can benefit from Home with home PT.      SECONDARY DISCHARGE DIAGNOSES  Diagnosis: Diabetes  Assessment and Plan of Treatment: You have a history of diabetes that is well controlled with a HgbA1c of 6.2.  You are advised to eat foods that are low glycemic index which include beans and complex carbs, and to avoid high GI foods including white rice and potatoes. Uncontrolled sugars can lead to blindness, kidney failure, and contribute to diseases such as heart attacks and strokes. Your were not started on any medications at this hospital visit, and you are advised to keep your Primary Care Physician appointments in order to monitor and change medications as necessary.    Diagnosis: Chronic kidney disease (CKD)  Assessment and Plan of Treatment: You have a history of kidney disease. You were treated with IV hydration, and your renal function improved back to your baseline. We held nephrotoxic medications such as NSAID pain killers, and were cautious about the use of IV contrast if such scans were needed. You are advised to continue to stay well hydrated, and to seek medical attention if you have painful/burning urination, blood in urine, or increasing inability to control the flow of urine. You should follow up with your PCP within 1 week of dischage for monitoring and medication adjustment.    Diagnosis: Hyperlipidemia  Assessment and Plan of Treatment: You have a history of high blood fats. You are on medications for this. You were continued on the statin during this hospital admission. Your blood tests showed well controlled lipid levels in your body. You are advised to continue taking this medication daily and seek medical attention if you have intense muscle aches, or reddish discoloration of the urine. You should follow up with your PCP within 1 week of discharge for monitoring and medication adjustment.      Diagnosis: Vitamin D deficiency  Assessment and Plan of Treatment: Your Vitamin D level was found to be low. You received one dose of 50,000 units of vitamin D. You will continue to take Vitamin D 50,000 Units once a week on mondays, starting on 3/1/21. You will follow up with your PCP for monitoring of your VItamin D levels as out patient.

## 2021-02-19 NOTE — PROGRESS NOTE ADULT - PROBLEM SELECTOR PLAN 1
Pt admitted for surgical repair for the L charcot foot.   Pt had undergone 5th toe amputation R side in 2020  Pt had undergone  Tendoachilles lengthening, Subtalar joint fusion, Talonavicular fusion, NC fusion, and 1st met cuneiform fusion with application of posterior splint today 2/18  by the Podiatry team.  Pt admitted to medicine floor for Post OP monitoring and pain medications.   C/W pain meds- Dilaudid and percocet   DVT ppx to be started tomorrow   Resumed on diet - S/p surgical repair for the L charcot foot 2/18   - Had undergone 5th toe amputation R side in 2020  - Pt admitted to medicine floor for Post OP monitoring and pain medications.   - C/W pain meds- Percocet with bowel regimen (miralax, senna)  - Resume DVT ppx  - PT - Home with home PT

## 2021-02-19 NOTE — PROGRESS NOTE ADULT - ASSESSMENT
Assessment  Left foot Charcot  Left foot s/p TN,STJ fusion      Plan:  Patient evaluated and chart reviewed  Keep dressing intract.  Advised patient to keep his left foot NWB  Advised patient to keep his dressing clean, dry and intact till next visit with Dr. Eng  Patient needs to follow up with Dr. Eng next week  Patient is stable for discharge from podiatry standpoint.  Patient discussed with attending     Assessment  Left foot Charcot  Left foot s/p medial column fusion,STJ fusion,CHRIS      Plan:  Patient evaluated and chart reviewed  Keep posterior splint  intact  Advised patient to keep his left foot NWB  Advised patient to keep his dressing clean, dry and intact till next visit with Dr. Eng  Patient needs to follow up with Dr. Eng next week  Patient is stable for discharge from podiatry standpoint.  Patient discussed with attending

## 2021-02-20 LAB
ANION GAP SERPL CALC-SCNC: 9 MMOL/L — SIGNIFICANT CHANGE UP (ref 5–17)
BASOPHILS # BLD AUTO: 0.04 K/UL — SIGNIFICANT CHANGE UP (ref 0–0.2)
BASOPHILS NFR BLD AUTO: 0.4 % — SIGNIFICANT CHANGE UP (ref 0–2)
BUN SERPL-MCNC: 24 MG/DL — HIGH (ref 7–18)
CALCIUM SERPL-MCNC: 8.4 MG/DL — SIGNIFICANT CHANGE UP (ref 8.4–10.5)
CHLORIDE SERPL-SCNC: 107 MMOL/L — SIGNIFICANT CHANGE UP (ref 96–108)
CO2 SERPL-SCNC: 23 MMOL/L — SIGNIFICANT CHANGE UP (ref 22–31)
CREAT SERPL-MCNC: 1.37 MG/DL — HIGH (ref 0.5–1.3)
EOSINOPHIL # BLD AUTO: 0.18 K/UL — SIGNIFICANT CHANGE UP (ref 0–0.5)
EOSINOPHIL NFR BLD AUTO: 1.7 % — SIGNIFICANT CHANGE UP (ref 0–6)
GLUCOSE BLDC GLUCOMTR-MCNC: 137 MG/DL — HIGH (ref 70–99)
GLUCOSE BLDC GLUCOMTR-MCNC: 149 MG/DL — HIGH (ref 70–99)
GLUCOSE BLDC GLUCOMTR-MCNC: 157 MG/DL — HIGH (ref 70–99)
GLUCOSE BLDC GLUCOMTR-MCNC: 180 MG/DL — HIGH (ref 70–99)
GLUCOSE SERPL-MCNC: 127 MG/DL — HIGH (ref 70–99)
HCT VFR BLD CALC: 27.3 % — LOW (ref 39–50)
HCT VFR BLD CALC: 29.5 % — LOW (ref 39–50)
HGB BLD-MCNC: 8.9 G/DL — LOW (ref 13–17)
HGB BLD-MCNC: 9.4 G/DL — LOW (ref 13–17)
IMM GRANULOCYTES NFR BLD AUTO: 0.5 % — SIGNIFICANT CHANGE UP (ref 0–1.5)
LACTATE SERPL-SCNC: 1.8 MMOL/L — SIGNIFICANT CHANGE UP (ref 0.7–2)
LYMPHOCYTES # BLD AUTO: 2.61 K/UL — SIGNIFICANT CHANGE UP (ref 1–3.3)
LYMPHOCYTES # BLD AUTO: 24.7 % — SIGNIFICANT CHANGE UP (ref 13–44)
MCHC RBC-ENTMCNC: 29.4 PG — SIGNIFICANT CHANGE UP (ref 27–34)
MCHC RBC-ENTMCNC: 30.2 PG — SIGNIFICANT CHANGE UP (ref 27–34)
MCHC RBC-ENTMCNC: 31.9 GM/DL — LOW (ref 32–36)
MCHC RBC-ENTMCNC: 32.6 GM/DL — SIGNIFICANT CHANGE UP (ref 32–36)
MCV RBC AUTO: 92.2 FL — SIGNIFICANT CHANGE UP (ref 80–100)
MCV RBC AUTO: 92.5 FL — SIGNIFICANT CHANGE UP (ref 80–100)
MONOCYTES # BLD AUTO: 1 K/UL — HIGH (ref 0–0.9)
MONOCYTES NFR BLD AUTO: 9.5 % — SIGNIFICANT CHANGE UP (ref 2–14)
NEUTROPHILS # BLD AUTO: 6.69 K/UL — SIGNIFICANT CHANGE UP (ref 1.8–7.4)
NEUTROPHILS NFR BLD AUTO: 63.2 % — SIGNIFICANT CHANGE UP (ref 43–77)
NRBC # BLD: 0 /100 WBCS — SIGNIFICANT CHANGE UP (ref 0–0)
NRBC # BLD: 0 /100 WBCS — SIGNIFICANT CHANGE UP (ref 0–0)
PLATELET # BLD AUTO: 182 K/UL — SIGNIFICANT CHANGE UP (ref 150–400)
PLATELET # BLD AUTO: 225 K/UL — SIGNIFICANT CHANGE UP (ref 150–400)
POTASSIUM SERPL-MCNC: 3.9 MMOL/L — SIGNIFICANT CHANGE UP (ref 3.5–5.3)
POTASSIUM SERPL-SCNC: 3.9 MMOL/L — SIGNIFICANT CHANGE UP (ref 3.5–5.3)
RBC # BLD: 2.95 M/UL — LOW (ref 4.2–5.8)
RBC # BLD: 3.2 M/UL — LOW (ref 4.2–5.8)
RBC # FLD: 13.4 % — SIGNIFICANT CHANGE UP (ref 10.3–14.5)
RBC # FLD: 13.4 % — SIGNIFICANT CHANGE UP (ref 10.3–14.5)
SODIUM SERPL-SCNC: 139 MMOL/L — SIGNIFICANT CHANGE UP (ref 135–145)
WBC # BLD: 10.57 K/UL — HIGH (ref 3.8–10.5)
WBC # BLD: 11.01 K/UL — HIGH (ref 3.8–10.5)
WBC # FLD AUTO: 10.57 K/UL — HIGH (ref 3.8–10.5)
WBC # FLD AUTO: 11.01 K/UL — HIGH (ref 3.8–10.5)

## 2021-02-20 PROCEDURE — 99233 SBSQ HOSP IP/OBS HIGH 50: CPT | Mod: GC

## 2021-02-20 PROCEDURE — 71045 X-RAY EXAM CHEST 1 VIEW: CPT | Mod: 26

## 2021-02-20 RX ORDER — ACETAMINOPHEN 500 MG
650 TABLET ORAL EVERY 6 HOURS
Refills: 0 | Status: DISCONTINUED | OUTPATIENT
Start: 2021-02-20 | End: 2021-02-22

## 2021-02-20 RX ORDER — VANCOMYCIN HCL 1 G
1500 VIAL (EA) INTRAVENOUS ONCE
Refills: 0 | Status: COMPLETED | OUTPATIENT
Start: 2021-02-20 | End: 2021-02-20

## 2021-02-20 RX ORDER — PIPERACILLIN AND TAZOBACTAM 4; .5 G/20ML; G/20ML
3.38 INJECTION, POWDER, LYOPHILIZED, FOR SOLUTION INTRAVENOUS ONCE
Refills: 0 | Status: COMPLETED | OUTPATIENT
Start: 2021-02-20 | End: 2021-02-20

## 2021-02-20 RX ADMIN — OXYCODONE AND ACETAMINOPHEN 1 TABLET(S): 5; 325 TABLET ORAL at 23:32

## 2021-02-20 RX ADMIN — SODIUM CHLORIDE 3 MILLILITER(S): 9 INJECTION INTRAMUSCULAR; INTRAVENOUS; SUBCUTANEOUS at 05:56

## 2021-02-20 RX ADMIN — ATORVASTATIN CALCIUM 20 MILLIGRAM(S): 80 TABLET, FILM COATED ORAL at 22:47

## 2021-02-20 RX ADMIN — Medication 650 MILLIGRAM(S): at 22:47

## 2021-02-20 RX ADMIN — OXYCODONE AND ACETAMINOPHEN 1 TABLET(S): 5; 325 TABLET ORAL at 11:51

## 2021-02-20 RX ADMIN — POLYETHYLENE GLYCOL 3350 17 GRAM(S): 17 POWDER, FOR SOLUTION ORAL at 11:30

## 2021-02-20 RX ADMIN — Medication 1 MILLIGRAM(S): at 11:29

## 2021-02-20 RX ADMIN — PANTOPRAZOLE SODIUM 40 MILLIGRAM(S): 20 TABLET, DELAYED RELEASE ORAL at 05:57

## 2021-02-20 RX ADMIN — Medication 1: at 11:32

## 2021-02-20 RX ADMIN — SODIUM CHLORIDE 3 MILLILITER(S): 9 INJECTION INTRAMUSCULAR; INTRAVENOUS; SUBCUTANEOUS at 22:47

## 2021-02-20 RX ADMIN — ENOXAPARIN SODIUM 40 MILLIGRAM(S): 100 INJECTION SUBCUTANEOUS at 11:29

## 2021-02-20 RX ADMIN — SODIUM CHLORIDE 3 MILLILITER(S): 9 INJECTION INTRAMUSCULAR; INTRAVENOUS; SUBCUTANEOUS at 14:02

## 2021-02-20 NOTE — PROGRESS NOTE ADULT - SUBJECTIVE AND OBJECTIVE BOX
Podiatry progress: 6o y/o male s/p medial column fusion,STJ fusion,CHRIS  2/18/21. Patient seen resting comfortably in bed. Patient notes some mild discomfort to his LLE. Denies any acute overnight events. Patient denies fever, chills, nausea, vomiting ,SOB.He denies any pain on his calves.    Medications atorvastatin 20 milliGRAM(s) Oral at bedtime  enoxaparin Injectable 40 milliGRAM(s) SubCutaneous daily  folic acid 1 milliGRAM(s) Oral daily  insulin lispro (ADMELOG) corrective regimen sliding scale   SubCutaneous three times a day before meals  oxycodone    5 mG/acetaminophen 325 mG 1 Tablet(s) Oral every 6 hours PRN  pantoprazole    Tablet 40 milliGRAM(s) Oral before breakfast  polyethylene glycol 3350 17 Gram(s) Oral daily  senna 2 Tablet(s) Oral at bedtime  sodium chloride 0.9% lock flush 3 milliLiter(s) IV Push every 8 hours    FHKnown health problems: none    No pertinent family history in first degree relatives    ,   PMHCharcot&#x27;s joint, left ankle and foot    Chronic GERD    Diabetic ulcer of right foot    Hyperlipidemia    Diabetes    No pertinent past medical history       PSHH/O amputation of lesser toe    H/O amputation of lesser toe, left    H/O Achilles tendon repair    No significant past surgical history        Labs                          9.4    11.01 )-----------( 182      ( 20 Feb 2021 07:07 )             29.5      02-20    139  |  107  |  24<H>  ----------------------------<  127<H>  3.9   |  23  |  1.37<H>    Ca    8.4      20 Feb 2021 07:07  Phos  4.1     02-19  Mg     1.9     02-19    TPro  7.0  /  Alb  3.1<L>  /  TBili  0.4  /  DBili  x   /  AST  17  /  ALT  34  /  AlkPhos  131<H>  02-19     Vital Signs Last 24 Hrs  T(C): 37.1 (20 Feb 2021 05:52), Max: 38.4 (19 Feb 2021 20:58)  T(F): 98.7 (20 Feb 2021 05:52), Max: 101.1 (19 Feb 2021 20:58)  HR: 92 (20 Feb 2021 05:52) (92 - 100)  BP: 115/65 (20 Feb 2021 05:52) (99/65 - 115/65)  BP(mean): --  RR: 18 (20 Feb 2021 05:52) (18 - 18)  SpO2: 98% (20 Feb 2021 05:52) (98% - 99%)          WBC Count: 11.01 K/uL <H> (02-20-21 @ 07:07)      ---------------------------------------------------------------------------------------------------------  Physical exam Left foot: Posterior splint left intact.   Vasc: CFT < 3 sec to all digits. No erythema or edema noted  Neuro: Protective sensation grossly intact to all digits  MSK: patient was able to wiggle his toes

## 2021-02-20 NOTE — PROGRESS NOTE ADULT - ASSESSMENT
Post -operative fever and leukocytosis after Charcot's reconstructive surgery on 2/18  DM  CKD  HLD    Plan:   Will monitor off antibiotics  Hb and Cr stable   Will cont current pain mx with bowel regimen  Wound care as per podiatry

## 2021-02-20 NOTE — CHART NOTE - NSCHARTNOTEFT_GEN_A_CORE
RN paged for Fever 100.9. Pt noted to be POD day 2. Pt has been spiking fevers since last two evenings. No doses of antibiotic given thus far. Will obtain sepsis work up at this time. Pending CBC results, will consider starting ABX to cover from gram positive bacteria as pt with recent surgery RN paged for Fever 100.9. Pt noted to be POD day 2. Pt has been spiking fevers since last two evenings. No doses of antibiotic given thus far. Will obtain sepsis work up, CXR, lactate, CBC, procal, UA at this time.     CBC shows WBC trend down. Lactate 1.8. Will not start ABx at this time as patient is likely with post-operative fever. Pt denies dysuria, pain, and SOB. RN paged for Fever 100.9. Pt noted to be POD day 2. Pt has been spiking fevers since last two evenings. No doses of antibiotic given thus far. Will obtain sepsis work up, CXR, lactate, CBC, procal, UA at this time. Will give 1 dose of vanc and zosyn. ID Dr. Eugene Consulted. Case discussed with senior resident on call. Primary care team to follow.

## 2021-02-20 NOTE — PROGRESS NOTE ADULT - SUBJECTIVE AND OBJECTIVE BOX
Patient is a 60y old  Male who presents with a chief complaint of Charcot foot repair (20 Feb 2021 09:00)    Patient was seen and examined at bedside   Denies any complains   Had fever spike overnight     REVIEW OF SYSTEMS: denies fever, chills, SOB, palpitations, chest pain, abdominal pain, nausea, vomiting, diarrhea, constipation, dizziness    INTERVAL HPI/OVERNIGHT EVENTS:  T(C): 36.7 (02-20-21 @ 13:35), Max: 38.4 (02-19-21 @ 20:58)  HR: 90 (02-20-21 @ 13:35) (90 - 96)  BP: 108/64 (02-20-21 @ 13:35) (103/58 - 115/65)  RR: 18 (02-20-21 @ 13:35) (18 - 18)  SpO2: 99% (02-20-21 @ 13:35) (98% - 99%)  Wt(kg): --  I&O's Summary    PHYSICAL EXAM:  GENERAL: NAD, well-groomed, well-developed  HEAD:  Atraumatic, Normocephalic  NERVOUS SYSTEM:  Alert & Oriented X3, Good concentration; Motor Strength 5/5 B/L upper and lower extremities  CHEST/LUNG: Clear to auscultation bilaterally; No rales, rhonchi, wheezing, or rubs  HEART: Regular rate and rhythm; No murmurs, rubs, or gallops  ABDOMEN: Soft, Nontender, Nondistended; Bowel sounds present  EXTREMITIES:  LLE bandage noted, able to wiggle his toes  SKIN: No rashes or lesions      MEDICATIONS  (STANDING):  atorvastatin 20 milliGRAM(s) Oral at bedtime  enoxaparin Injectable 40 milliGRAM(s) SubCutaneous daily  folic acid 1 milliGRAM(s) Oral daily  insulin lispro (ADMELOG) corrective regimen sliding scale   SubCutaneous three times a day before meals  pantoprazole    Tablet 40 milliGRAM(s) Oral before breakfast  polyethylene glycol 3350 17 Gram(s) Oral daily  senna 2 Tablet(s) Oral at bedtime  sodium chloride 0.9% lock flush 3 milliLiter(s) IV Push every 8 hours    MEDICATIONS  (PRN):  oxycodone    5 mG/acetaminophen 325 mG 1 Tablet(s) Oral every 6 hours PRN Severe Pain (7 - 10)        LABS:                        9.4    11.01 )-----------( 182      ( 20 Feb 2021 07:07 )             29.5     139  |  107  |  24<H>  ----------------------------<  127<H>  3.9   |  23  |  1.37<H>    Ca    8.4      20 Feb 2021 07:07  Phos  4.1     02-19  Mg     1.9     02-19    TPro  7.0  /  Alb  3.1<L>  /  TBili  0.4  /  DBili  x   /  AST  17  /  ALT  34  /  AlkPhos  131<H>  02-19        CAPILLARY BLOOD GLUCOSE      POCT Blood Glucose.: 149 mg/dL (20 Feb 2021 16:10)  POCT Blood Glucose.: 157 mg/dL (20 Feb 2021 11:14)  POCT Blood Glucose.: 137 mg/dL (20 Feb 2021 07:50)  POCT Blood Glucose.: 176 mg/dL (19 Feb 2021 21:36)

## 2021-02-20 NOTE — PROGRESS NOTE ADULT - ASSESSMENT
Assessment  Left foot Charcot  Left foot s/p medial column fusion,STJ fusion,CHRIS      Plan:  Patient evaluated and chart reviewed  Patient's dressings and posterior splint left intact  Advised patient to keep his left foot NWB  Advised patient to keep his dressing clean, dry and intact till next visit with Dr. Eng  Patient needs to follow up with Dr. Eng next week  Patient is stable for discharge from podiatry standpoint.  Patient discussed with attending    Patient seen bedside with attending Dr. Rg

## 2021-02-21 LAB
APPEARANCE UR: CLEAR — SIGNIFICANT CHANGE UP
BACTERIA # UR AUTO: ABNORMAL /HPF
BILIRUB UR-MCNC: NEGATIVE — SIGNIFICANT CHANGE UP
COLOR SPEC: YELLOW — SIGNIFICANT CHANGE UP
DIFF PNL FLD: ABNORMAL
EPI CELLS # UR: SIGNIFICANT CHANGE UP /HPF
GLUCOSE BLDC GLUCOMTR-MCNC: 125 MG/DL — HIGH (ref 70–99)
GLUCOSE BLDC GLUCOMTR-MCNC: 138 MG/DL — HIGH (ref 70–99)
GLUCOSE BLDC GLUCOMTR-MCNC: 140 MG/DL — HIGH (ref 70–99)
GLUCOSE BLDC GLUCOMTR-MCNC: 150 MG/DL — HIGH (ref 70–99)
GLUCOSE UR QL: NEGATIVE — SIGNIFICANT CHANGE UP
KETONES UR-MCNC: NEGATIVE — SIGNIFICANT CHANGE UP
LEUKOCYTE ESTERASE UR-ACNC: NEGATIVE — SIGNIFICANT CHANGE UP
NITRITE UR-MCNC: NEGATIVE — SIGNIFICANT CHANGE UP
PH UR: 7 — SIGNIFICANT CHANGE UP (ref 5–8)
PROCALCITONIN SERPL-MCNC: 0.18 NG/ML — HIGH (ref 0.02–0.1)
PROT UR-MCNC: 100
RBC CASTS # UR COMP ASSIST: ABNORMAL /HPF (ref 0–2)
SP GR SPEC: 1.01 — SIGNIFICANT CHANGE UP (ref 1.01–1.02)
UROBILINOGEN FLD QL: NEGATIVE — SIGNIFICANT CHANGE UP
WBC UR QL: SIGNIFICANT CHANGE UP /HPF (ref 0–5)

## 2021-02-21 PROCEDURE — 99233 SBSQ HOSP IP/OBS HIGH 50: CPT | Mod: GC

## 2021-02-21 RX ADMIN — Medication 1 MILLIGRAM(S): at 12:21

## 2021-02-21 RX ADMIN — SODIUM CHLORIDE 3 MILLILITER(S): 9 INJECTION INTRAMUSCULAR; INTRAVENOUS; SUBCUTANEOUS at 21:01

## 2021-02-21 RX ADMIN — SODIUM CHLORIDE 3 MILLILITER(S): 9 INJECTION INTRAMUSCULAR; INTRAVENOUS; SUBCUTANEOUS at 15:25

## 2021-02-21 RX ADMIN — Medication 300 MILLIGRAM(S): at 01:01

## 2021-02-21 RX ADMIN — ENOXAPARIN SODIUM 40 MILLIGRAM(S): 100 INJECTION SUBCUTANEOUS at 12:22

## 2021-02-21 RX ADMIN — SODIUM CHLORIDE 3 MILLILITER(S): 9 INJECTION INTRAMUSCULAR; INTRAVENOUS; SUBCUTANEOUS at 05:01

## 2021-02-21 RX ADMIN — OXYCODONE AND ACETAMINOPHEN 1 TABLET(S): 5; 325 TABLET ORAL at 21:44

## 2021-02-21 RX ADMIN — ATORVASTATIN CALCIUM 20 MILLIGRAM(S): 80 TABLET, FILM COATED ORAL at 21:45

## 2021-02-21 RX ADMIN — OXYCODONE AND ACETAMINOPHEN 1 TABLET(S): 5; 325 TABLET ORAL at 12:21

## 2021-02-21 RX ADMIN — PANTOPRAZOLE SODIUM 40 MILLIGRAM(S): 20 TABLET, DELAYED RELEASE ORAL at 05:00

## 2021-02-21 RX ADMIN — PIPERACILLIN AND TAZOBACTAM 200 GRAM(S): 4; .5 INJECTION, POWDER, LYOPHILIZED, FOR SOLUTION INTRAVENOUS at 01:01

## 2021-02-21 RX ADMIN — SENNA PLUS 2 TABLET(S): 8.6 TABLET ORAL at 21:45

## 2021-02-21 NOTE — PROGRESS NOTE ADULT - SUBJECTIVE AND OBJECTIVE BOX
PGY-1 Progress Note discussed with attending    PAGER #: [[phone]] TILL 5:00 PM  PLEASE CONTACT ON CALL TEAM:  - On Call Team (Please refer to Darius) FROM 5:00 PM - 8:30PM  - Nightfloat Team FROM 8:30 -7:30 AM    CHIEF COMPLAINT & BRIEF HOSPITAL COURSE:    INTERVAL HPI/OVERNIGHT EVENTS:       REVIEW OF SYSTEMS:  CONSTITUTIONAL: No fever, weight loss, or fatigue  RESPIRATORY: No cough, wheezing, chills or hemoptysis; No shortness of breath  CARDIOVASCULAR: No chest pain, palpitations, dizziness, or leg swelling  GASTROINTESTINAL: No abdominal pain. No nausea, vomiting, or hematemesis; No diarrhea or constipation. No melena or hematochezia.  GENITOURINARY: No dysuria or hematuria, urinary frequency  NEUROLOGICAL: No headaches, memory loss, loss of strength, numbness, or tremors  SKIN: No itching, burning, rashes, or lesions     MEDICATIONS  (STANDING):  atorvastatin 20 milliGRAM(s) Oral at bedtime  enoxaparin Injectable 40 milliGRAM(s) SubCutaneous daily  folic acid 1 milliGRAM(s) Oral daily  insulin lispro (ADMELOG) corrective regimen sliding scale   SubCutaneous three times a day before meals  pantoprazole    Tablet 40 milliGRAM(s) Oral before breakfast  polyethylene glycol 3350 17 Gram(s) Oral daily  senna 2 Tablet(s) Oral at bedtime  sodium chloride 0.9% lock flush 3 milliLiter(s) IV Push every 8 hours    MEDICATIONS  (PRN):  acetaminophen   Tablet .. 650 milliGRAM(s) Oral every 6 hours PRN Temp greater or equal to 38C (100.4F), Mild Pain (1 - 3)  oxycodone    5 mG/acetaminophen 325 mG 1 Tablet(s) Oral every 6 hours PRN Severe Pain (7 - 10)      Vital Signs Last 24 Hrs  T(C): 37.2 (21 Feb 2021 05:05), Max: 38.3 (20 Feb 2021 21:05)  T(F): 99 (21 Feb 2021 05:05), Max: 100.9 (20 Feb 2021 21:05)  HR: 83 (21 Feb 2021 05:05) (83 - 92)  BP: 108/69 (21 Feb 2021 05:05) (96/59 - 108/69)  BP(mean): --  RR: 18 (21 Feb 2021 05:05) (18 - 18)  SpO2: 97% (21 Feb 2021 05:05) (97% - 99%)    PHYSICAL EXAMINATION:  GENERAL: NAD, well built  HEAD:  Atraumatic, Normocephalic  EYES:  conjunctiva and sclera clear  NECK: Supple, No JVD, Normal thyroid  CHEST/LUNG: Clear to auscultation. Clear to percussion bilaterally; No rales, rhonchi, wheezing, or rubs  HEART: Regular rate and rhythm; No murmurs, rubs, or gallops  ABDOMEN: Soft, Nontender, Nondistended; Bowel sounds present  NERVOUS SYSTEM:  Alert & Oriented X3,    EXTREMITIES:  2+ Peripheral Pulses, No clubbing, cyanosis, or edema  SKIN: warm dry                          8.9    10.57 )-----------( 225      ( 20 Feb 2021 22:17 )             27.3     02-20    139  |  107  |  24<H>  ----------------------------<  127<H>  3.9   |  23  |  1.37<H>    Ca    8.4      20 Feb 2021 07:07                CAPILLARY BLOOD GLUCOSE      RADIOLOGY & ADDITIONAL TESTS:                   PGY-1 Progress Note discussed with attending    PAGER #: [[phone]] TILL 5:00 PM  PLEASE CONTACT ON CALL TEAM:  - On Call Team (Please refer to Darius) FROM 5:00 PM - 8:30PM  - Nightfloat Team FROM 8:30 -7:30 AM    CHIEF COMPLAINT & BRIEF HOSPITAL COURSE:  60M from home PMHx of DM (not on meds), HLD, CKD, S/P R foot 5th toe amputation in March 2020 along with foot ulcer was admitted for surgical repair for the Charcot foot. Pt was hospitalized in June 2020 and had undergone surgery with R healed foot ulcer. Since last year, Pt noted gradual onset of swelling and pain in the L foot; he found it difficult to ambulate. Pt follows with podiatry Dr. Eng OP and he underwent Tendoachilles lengthening, Subtalar joint fusion, Talonavicular fusion, NC fusion, and 1st met cuneiform fusion with application of posterior splint 2/18. Pt endorses mild L shoulder pain but reports that he has OA, and is following with orthopedics for further management. Pt was admitted to medicine floor for post operative monitoring and pain medications. Pt was observed for 24 hours post procedure, labs were evaluated to be wnl, and pain control was adequate. He was evaluated by PT and found to have some impairments for which he can go home with home PT. At discharge he had passed BM, and wound dressing was clean with no drainage.     INTERVAL HPI/OVERNIGHT EVENTS:   Patient was examined while he was lying in bed, Aox3, responding appropriately to questions, in NAD. He has normal bowel and bladder movements, and denies any other acute complaints; pain is within control with percocet; on bowel regimen. Pt continues to spike low grade post op fevers; awaiting blood cultures, pt received stat dose of vanc and zosyn overnight. No cough, chest pain, lower limb swelling, wound is clean and healing well.      REVIEW OF SYSTEMS:  CONSTITUTIONAL: No fever, weight loss, or fatigue  RESPIRATORY: No cough, wheezing, chills or hemoptysis; No shortness of breath  CARDIOVASCULAR: No chest pain, palpitations, dizziness, or leg swelling  GASTROINTESTINAL: No abdominal pain. No nausea, vomiting, or hematemesis; No diarrhea or constipation. No melena or hematochezia.  GENITOURINARY: No dysuria or hematuria, urinary frequency  NEUROLOGICAL: No headaches, memory loss, loss of strength, numbness, or tremors  SKIN: Left foot post op pain + No itching, burning, rashes, or lesions     MEDICATIONS  (STANDING):  atorvastatin 20 milliGRAM(s) Oral at bedtime  enoxaparin Injectable 40 milliGRAM(s) SubCutaneous daily  folic acid 1 milliGRAM(s) Oral daily  insulin lispro (ADMELOG) corrective regimen sliding scale   SubCutaneous three times a day before meals  pantoprazole    Tablet 40 milliGRAM(s) Oral before breakfast  polyethylene glycol 3350 17 Gram(s) Oral daily  senna 2 Tablet(s) Oral at bedtime  sodium chloride 0.9% lock flush 3 milliLiter(s) IV Push every 8 hours    MEDICATIONS  (PRN):  acetaminophen   Tablet .. 650 milliGRAM(s) Oral every 6 hours PRN Temp greater or equal to 38C (100.4F), Mild Pain (1 - 3)  oxycodone    5 mG/acetaminophen 325 mG 1 Tablet(s) Oral every 6 hours PRN Severe Pain (7 - 10)      Vital Signs Last 24 Hrs  T(C): 37.2 (21 Feb 2021 05:05), Max: 38.3 (20 Feb 2021 21:05)  T(F): 99 (21 Feb 2021 05:05), Max: 100.9 (20 Feb 2021 21:05)  HR: 83 (21 Feb 2021 05:05) (83 - 92)  BP: 108/69 (21 Feb 2021 05:05) (96/59 - 108/69)  BP(mean): --  RR: 18 (21 Feb 2021 05:05) (18 - 18)  SpO2: 97% (21 Feb 2021 05:05) (97% - 99%)    PHYSICAL EXAMINATION:  GENERAL: Well built  male, in NAD   HEAD:  Atraumatic, Normocephalic  EYES:  conjunctiva and sclera clear  NECK: Supple, No JVD, Normal thyroid  CHEST/LUNG: Clear to auscultation. Clear to percussion bilaterally; No rales, rhonchi, wheezing, or rubs  HEART: Regular rate and rhythm; No murmurs, rubs, or gallops  ABDOMEN: Soft, Nontender, Nondistended; Bowel sounds present  NERVOUS SYSTEM:  Alert & Oriented X3, No motor or sensory deficits   EXTREMITIES: Left foot wrapped in ACE/ splint, no discharge, wound dry 2+ Peripheral Pulses, No clubbing, cyanosis, or edema  SKIN: warm dry                            8.9    10.57 )-----------( 225      ( 20 Feb 2021 22:17 )             27.3     02-20    139  |  107  |  24<H>  ----------------------------<  127<H>  3.9   |  23  |  1.37<H>    Ca    8.4      20 Feb 2021 07:07                CAPILLARY BLOOD GLUCOSE      RADIOLOGY & ADDITIONAL TESTS:                   PGY-1 Progress Note discussed with attending    PAGER #: [[phone]] TILL 5:00 PM  PLEASE CONTACT ON CALL TEAM:  - On Call Team (Please refer to Darius) FROM 5:00 PM - 8:30PM  - Nightfloat Team FROM 8:30 -7:30 AM    CHIEF COMPLAINT & BRIEF HOSPITAL COURSE:  60M from home PMHx of DM (not on meds), HLD, CKD, S/P R foot 5th toe amputation in March 2020 along with foot ulcer was admitted for surgical repair for the Charcot foot. Pt was hospitalized in June 2020 and had undergone surgery with R healed foot ulcer. Since last year, Pt noted gradual onset of swelling and pain in the L foot; he found it difficult to ambulate. Pt follows with podiatry Dr. Eng OP and he underwent Tendoachilles lengthening, Subtalar joint fusion, Talonavicular fusion, NC fusion, and 1st met cuneiform fusion with application of posterior splint 2/18. Pt endorses mild L shoulder pain but reports that he has OA, and is following with orthopedics for further management. Pt was admitted to medicine floor for post operative monitoring and pain medications. Pt was observed for 24 hours post procedure, labs were evaluated to be wnl, and pain control was adequate. He was evaluated by PT and found to have some impairments for which he can go home with home PT.    INTERVAL HPI/OVERNIGHT EVENTS:   Patient was examined while he was lying in bed, Aox3, responding appropriately to questions, in NAD. He has normal bowel and bladder movements, and denies any other acute complaints; pain is within control with percocet; on bowel regimen. Pt continues to spike low grade post op fevers; awaiting blood cultures, pt received stat dose of vanc and zosyn overnight. No cough, chest pain, lower limb swelling, wound is clean and healing well.      REVIEW OF SYSTEMS:  CONSTITUTIONAL: No fever, weight loss, or fatigue  RESPIRATORY: No cough, wheezing, chills or hemoptysis; No shortness of breath  CARDIOVASCULAR: No chest pain, palpitations, dizziness, or leg swelling  GASTROINTESTINAL: No abdominal pain. No nausea, vomiting, or hematemesis; No diarrhea or constipation. No melena or hematochezia.  GENITOURINARY: No dysuria or hematuria, urinary frequency  NEUROLOGICAL: No headaches, memory loss, loss of strength, numbness, or tremors  SKIN: Left foot post op pain + No itching, burning, rashes, or lesions     MEDICATIONS  (STANDING):  atorvastatin 20 milliGRAM(s) Oral at bedtime  enoxaparin Injectable 40 milliGRAM(s) SubCutaneous daily  folic acid 1 milliGRAM(s) Oral daily  insulin lispro (ADMELOG) corrective regimen sliding scale   SubCutaneous three times a day before meals  pantoprazole    Tablet 40 milliGRAM(s) Oral before breakfast  polyethylene glycol 3350 17 Gram(s) Oral daily  senna 2 Tablet(s) Oral at bedtime  sodium chloride 0.9% lock flush 3 milliLiter(s) IV Push every 8 hours    MEDICATIONS  (PRN):  acetaminophen   Tablet .. 650 milliGRAM(s) Oral every 6 hours PRN Temp greater or equal to 38C (100.4F), Mild Pain (1 - 3)  oxycodone    5 mG/acetaminophen 325 mG 1 Tablet(s) Oral every 6 hours PRN Severe Pain (7 - 10)      Vital Signs Last 24 Hrs  T(C): 37.2 (21 Feb 2021 05:05), Max: 38.3 (20 Feb 2021 21:05)  T(F): 99 (21 Feb 2021 05:05), Max: 100.9 (20 Feb 2021 21:05)  HR: 83 (21 Feb 2021 05:05) (83 - 92)  BP: 108/69 (21 Feb 2021 05:05) (96/59 - 108/69)  BP(mean): --  RR: 18 (21 Feb 2021 05:05) (18 - 18)  SpO2: 97% (21 Feb 2021 05:05) (97% - 99%)    PHYSICAL EXAMINATION:  GENERAL: Well built  male, in NAD   HEAD:  Atraumatic, Normocephalic  EYES:  conjunctiva and sclera clear  NECK: Supple, No JVD, Normal thyroid  CHEST/LUNG: Clear to auscultation. Clear to percussion bilaterally; No rales, rhonchi, wheezing, or rubs  HEART: Regular rate and rhythm; No murmurs, rubs, or gallops  ABDOMEN: Soft, Nontender, Nondistended; Bowel sounds present  NERVOUS SYSTEM:  Alert & Oriented X3, No motor or sensory deficits   EXTREMITIES: Left foot wrapped in ACE/ splint, no discharge, wound dry 2+ Peripheral Pulses, No clubbing, cyanosis, or edema  SKIN: warm dry                            8.9    10.57 )-----------( 225      ( 20 Feb 2021 22:17 )             27.3     02-20    139  |  107  |  24<H>  ----------------------------<  127<H>  3.9   |  23  |  1.37<H>    Ca    8.4      20 Feb 2021 07:07                CAPILLARY BLOOD GLUCOSE      RADIOLOGY & ADDITIONAL TESTS:

## 2021-02-21 NOTE — PROGRESS NOTE ADULT - ASSESSMENT
Assessment  Left foot Charcot  Left foot s/p medial column fusion,STJ fusion,CHRIS      Plan:  Patient evaluated and chart reviewed  applied betadine soaked adaptic,[id number],ABD pad, Webril and secure posterior splint with ACE bandage  Advised patient to keep his left foot NWB  Advised patient to keep his dressing clean, dry and intact till next visit with Dr. Eng  Patient needs to be  monitor for his fever  Patient needs to follow up with Dr. Eng next week  Patient is stable for discharge from podiatry standpoint.  Patient discussed with attending

## 2021-02-21 NOTE — PROGRESS NOTE ADULT - PROBLEM SELECTOR PLAN 4
- Pt has PMH of CKD  - Baseline Cr as per OP lab work up from 2/17  Cr . 1.37   - FeNa 0.2%, prerenal, improving with oral hydration  - avoid nephrotoxic drugs

## 2021-02-21 NOTE — PROGRESS NOTE ADULT - ATTENDING COMMENTS
Patient was seen and examined at bedside today  Denies any complains  Had a fever spike last night     Vitals noted     P/E:   NAD  AAOx3, no focal deficit   No tonsillar swelling or pharyngeal erythema  Lungs CTABL  S1S2 WNL, no MRG   Abd soft, non tender, BS present   BL LE no calf tenderness or edema, bandage of LLE clean, dry and intact   Skin no rash or lesion, no signs of phlebitis     Plan:   Post -operative fever and leukocytosis after Charcot's reconstructive surgery on 2/18  DM  CKD  HLD    Plan:   No signs of infection, surgical wound is healthy as per podiatry note  Patient received Vanc and Zosyn, discussed the case with Dr Eugene. Will follow up with Sepsis work up including blood culture and watch off antibiotics now  Cont Incentive spirometry  Cont bowel regimen  Hb and Cr stable   Will cont current pain mx with bowel regimen  Wound care as per podiatry   PT eval home with home PT
Patient was seen and examined at bedside today  Reports pain is managed   Denies any other complains    Vitals stable     P/E:  NAD  Lungs CTABL  S1S2 WNL, No MRG   Abd soft, non tender, BS present   LLE bandage noted, RLE no edema or calf tenderness     Labs noted     A/P:  S/P Charcot's reconstructive surgery  Post op pain   Constipation  DM  HLD  CKD    Plan:  Cont current pain management   Hb stable post op  Cr trending down   PT eval   Bowel regimen  Podiatry follow up

## 2021-02-21 NOTE — PROGRESS NOTE ADULT - SUBJECTIVE AND OBJECTIVE BOX
Podiatry progress: 6o y/o male s/p medial column fusion,STJ fusion,CHRIS  2/18/21. Patient seen resting comfortably in bed. Patient notes mild numbness to his LLE. Patient admitted fever overnight . He denies chills, nausea, vomiting ,SOB. He denies any pain on his calves.         Medications acetaminophen   Tablet .. 650 milliGRAM(s) Oral every 6 hours PRN  atorvastatin 20 milliGRAM(s) Oral at bedtime  enoxaparin Injectable 40 milliGRAM(s) SubCutaneous daily  folic acid 1 milliGRAM(s) Oral daily  insulin lispro (ADMELOG) corrective regimen sliding scale   SubCutaneous three times a day before meals  oxycodone    5 mG/acetaminophen 325 mG 1 Tablet(s) Oral every 6 hours PRN  pantoprazole    Tablet 40 milliGRAM(s) Oral before breakfast  polyethylene glycol 3350 17 Gram(s) Oral daily  senna 2 Tablet(s) Oral at bedtime  sodium chloride 0.9% lock flush 3 milliLiter(s) IV Push every 8 hours    FHKnown health problems: none    No pertinent family history in first degree relatives    ,   PMHCharcot&#x27;s joint, left ankle and foot    Chronic GERD    Diabetic ulcer of right foot    Hyperlipidemia    Diabetes    No pertinent past medical history       PSHH/O amputation of lesser toe    H/O amputation of lesser toe, left    H/O Achilles tendon repair    No significant past surgical history        Labs                          8.9    10.57 )-----------( 225      ( 20 Feb 2021 22:17 )             27.3      02-20    139  |  107  |  24<H>  ----------------------------<  127<H>  3.9   |  23  |  1.37<H>    Ca    8.4      20 Feb 2021 07:07       Vital Signs Last 24 Hrs  T(C): 37.2 (21 Feb 2021 05:05), Max: 38.3 (20 Feb 2021 21:05)  T(F): 99 (21 Feb 2021 05:05), Max: 100.9 (20 Feb 2021 21:05)  HR: 83 (21 Feb 2021 05:05) (83 - 92)  BP: 108/69 (21 Feb 2021 05:05) (96/59 - 108/69)  BP(mean): --  RR: 18 (21 Feb 2021 05:05) (18 - 18)  SpO2: 97% (21 Feb 2021 05:05) (97% - 99%)          WBC Count: 10.57 K/uL <H> (02-20-21 @ 22:17)      Physical exam left foot  post op 2/21/21:  Vasc: DP/PT 2/4,CFT <3secx5,No erythema, No edema  Derm: suture is well coapted and intact, no dehiscence  Neuro: protective sensation intact  MSK: patient was able to wiggle his toes            ---------------------------------------------------------------------------------------------------------  Physical exam Left foot: Posterior splint left intact.   Vasc: CFT < 3 sec to all digits. No erythema or edema noted  Neuro: Protective sensation grossly intact to all digits  MSK: patient was able to wiggle his toes

## 2021-02-21 NOTE — PROGRESS NOTE ADULT - PROBLEM SELECTOR PLAN 1
- S/p surgical repair for the L charcot foot 2/18   - Had undergone 5th toe amputation R side in 2020  - Pt admitted to medicine floor for Post OP monitoring and pain medications.   - C/W pain meds- Percocet with bowel regimen (miralax, senna)  - Resume DVT ppx  - PT - Home with home PT - S/p surgical repair for the L charcot foot 2/18   - Had undergone 5th toe amputation R side in 2020  - Pt admitted to medicine floor for Post OP monitoring and pain medications.   - C/W pain meds- Percocet with bowel regimen (miralax, senna)  - C/w DVT ppx  - PT - Home with home PT - S/p surgical repair for the L Charcot foot 2/18   - Had undergone 5th toe amputation R side in 2020  - Pt admitted to medicine floor for Post OP monitoring and pain medications.   - C/W pain meds- Percocet with bowel regimen (miralax, senna)  - C/w DVT ppx  - PT - Home with home PT

## 2021-02-21 NOTE — PROGRESS NOTE ADULT - PROBLEM SELECTOR PLAN 2
- Pt has PMHx of DM previously on metformin/ insulin 2 mo's ago, not currently  - RBS- 86  - C/W HSS  - A1C 6.2 - prediabetic, will need metformin on d/c, PCP follow up

## 2021-02-22 ENCOUNTER — TRANSCRIPTION ENCOUNTER (OUTPATIENT)
Age: 61
End: 2021-02-22

## 2021-02-22 VITALS
SYSTOLIC BLOOD PRESSURE: 100 MMHG | OXYGEN SATURATION: 100 % | TEMPERATURE: 98 F | HEART RATE: 80 BPM | DIASTOLIC BLOOD PRESSURE: 58 MMHG | RESPIRATION RATE: 17 BRPM

## 2021-02-22 DIAGNOSIS — E55.9 VITAMIN D DEFICIENCY, UNSPECIFIED: ICD-10-CM

## 2021-02-22 LAB
ANION GAP SERPL CALC-SCNC: 8 MMOL/L — SIGNIFICANT CHANGE UP (ref 5–17)
BUN SERPL-MCNC: 22 MG/DL — HIGH (ref 7–18)
CALCIUM SERPL-MCNC: 9.2 MG/DL — SIGNIFICANT CHANGE UP (ref 8.4–10.5)
CHLORIDE SERPL-SCNC: 104 MMOL/L — SIGNIFICANT CHANGE UP (ref 96–108)
CO2 SERPL-SCNC: 23 MMOL/L — SIGNIFICANT CHANGE UP (ref 22–31)
CREAT SERPL-MCNC: 1.32 MG/DL — HIGH (ref 0.5–1.3)
GLUCOSE BLDC GLUCOMTR-MCNC: 153 MG/DL — HIGH (ref 70–99)
GLUCOSE BLDC GLUCOMTR-MCNC: 162 MG/DL — HIGH (ref 70–99)
GLUCOSE SERPL-MCNC: 149 MG/DL — HIGH (ref 70–99)
HCT VFR BLD CALC: 27.3 % — LOW (ref 39–50)
HGB BLD-MCNC: 8.8 G/DL — LOW (ref 13–17)
MCHC RBC-ENTMCNC: 29.4 PG — SIGNIFICANT CHANGE UP (ref 27–34)
MCHC RBC-ENTMCNC: 32.2 GM/DL — SIGNIFICANT CHANGE UP (ref 32–36)
MCV RBC AUTO: 91.3 FL — SIGNIFICANT CHANGE UP (ref 80–100)
NRBC # BLD: 0 /100 WBCS — SIGNIFICANT CHANGE UP (ref 0–0)
PLATELET # BLD AUTO: 217 K/UL — SIGNIFICANT CHANGE UP (ref 150–400)
POTASSIUM SERPL-MCNC: 4.3 MMOL/L — SIGNIFICANT CHANGE UP (ref 3.5–5.3)
POTASSIUM SERPL-SCNC: 4.3 MMOL/L — SIGNIFICANT CHANGE UP (ref 3.5–5.3)
RBC # BLD: 2.99 M/UL — LOW (ref 4.2–5.8)
RBC # FLD: 13.2 % — SIGNIFICANT CHANGE UP (ref 10.3–14.5)
SODIUM SERPL-SCNC: 135 MMOL/L — SIGNIFICANT CHANGE UP (ref 135–145)
WBC # BLD: 10.43 K/UL — SIGNIFICANT CHANGE UP (ref 3.8–10.5)
WBC # FLD AUTO: 10.43 K/UL — SIGNIFICANT CHANGE UP (ref 3.8–10.5)

## 2021-02-22 PROCEDURE — 84145 PROCALCITONIN (PCT): CPT

## 2021-02-22 PROCEDURE — 83935 ASSAY OF URINE OSMOLALITY: CPT

## 2021-02-22 PROCEDURE — 73630 X-RAY EXAM OF FOOT: CPT

## 2021-02-22 PROCEDURE — 81001 URINALYSIS AUTO W/SCOPE: CPT

## 2021-02-22 PROCEDURE — 82570 ASSAY OF URINE CREATININE: CPT

## 2021-02-22 PROCEDURE — 84100 ASSAY OF PHOSPHORUS: CPT

## 2021-02-22 PROCEDURE — 71045 X-RAY EXAM CHEST 1 VIEW: CPT

## 2021-02-22 PROCEDURE — 87040 BLOOD CULTURE FOR BACTERIA: CPT

## 2021-02-22 PROCEDURE — U0005: CPT

## 2021-02-22 PROCEDURE — 84300 ASSAY OF URINE SODIUM: CPT

## 2021-02-22 PROCEDURE — 82652 VIT D 1 25-DIHYDROXY: CPT

## 2021-02-22 PROCEDURE — 99222 1ST HOSP IP/OBS MODERATE 55: CPT

## 2021-02-22 PROCEDURE — 85027 COMPLETE CBC AUTOMATED: CPT

## 2021-02-22 PROCEDURE — 80061 LIPID PANEL: CPT

## 2021-02-22 PROCEDURE — C1713: CPT

## 2021-02-22 PROCEDURE — C1769: CPT

## 2021-02-22 PROCEDURE — 97116 GAIT TRAINING THERAPY: CPT

## 2021-02-22 PROCEDURE — C1734: CPT

## 2021-02-22 PROCEDURE — 97110 THERAPEUTIC EXERCISES: CPT

## 2021-02-22 PROCEDURE — 36415 COLL VENOUS BLD VENIPUNCTURE: CPT

## 2021-02-22 PROCEDURE — 87635 SARS-COV-2 COVID-19 AMP PRB: CPT

## 2021-02-22 PROCEDURE — 83036 HEMOGLOBIN GLYCOSYLATED A1C: CPT

## 2021-02-22 PROCEDURE — 82962 GLUCOSE BLOOD TEST: CPT

## 2021-02-22 PROCEDURE — 85025 COMPLETE CBC W/AUTO DIFF WBC: CPT

## 2021-02-22 PROCEDURE — 73650 X-RAY EXAM OF HEEL: CPT

## 2021-02-22 PROCEDURE — 80053 COMPREHEN METABOLIC PANEL: CPT

## 2021-02-22 PROCEDURE — 83605 ASSAY OF LACTIC ACID: CPT

## 2021-02-22 PROCEDURE — 83735 ASSAY OF MAGNESIUM: CPT

## 2021-02-22 PROCEDURE — 97161 PT EVAL LOW COMPLEX 20 MIN: CPT

## 2021-02-22 PROCEDURE — 97530 THERAPEUTIC ACTIVITIES: CPT

## 2021-02-22 PROCEDURE — 80048 BASIC METABOLIC PNL TOTAL CA: CPT

## 2021-02-22 PROCEDURE — 99239 HOSP IP/OBS DSCHRG MGMT >30: CPT | Mod: GC

## 2021-02-22 RX ORDER — ERGOCALCIFEROL 1.25 MG/1
50000 CAPSULE ORAL ONCE
Refills: 0 | Status: COMPLETED | OUTPATIENT
Start: 2021-02-22 | End: 2021-02-22

## 2021-02-22 RX ORDER — ACETAMINOPHEN 500 MG
2 TABLET ORAL
Qty: 0 | Refills: 0 | DISCHARGE
Start: 2021-02-22

## 2021-02-22 RX ORDER — ERGOCALCIFEROL 1.25 MG/1
1 CAPSULE ORAL
Qty: 7 | Refills: 0
Start: 2021-02-22 | End: 2021-04-08

## 2021-02-22 RX ADMIN — Medication 1: at 07:55

## 2021-02-22 RX ADMIN — OXYCODONE AND ACETAMINOPHEN 1 TABLET(S): 5; 325 TABLET ORAL at 08:56

## 2021-02-22 RX ADMIN — ERGOCALCIFEROL 50000 UNIT(S): 1.25 CAPSULE ORAL at 13:51

## 2021-02-22 RX ADMIN — SODIUM CHLORIDE 3 MILLILITER(S): 9 INJECTION INTRAMUSCULAR; INTRAVENOUS; SUBCUTANEOUS at 13:52

## 2021-02-22 RX ADMIN — PANTOPRAZOLE SODIUM 40 MILLIGRAM(S): 20 TABLET, DELAYED RELEASE ORAL at 05:18

## 2021-02-22 RX ADMIN — Medication 1 MILLIGRAM(S): at 12:25

## 2021-02-22 RX ADMIN — Medication 1: at 12:24

## 2021-02-22 RX ADMIN — SODIUM CHLORIDE 3 MILLILITER(S): 9 INJECTION INTRAMUSCULAR; INTRAVENOUS; SUBCUTANEOUS at 04:29

## 2021-02-22 RX ADMIN — ENOXAPARIN SODIUM 40 MILLIGRAM(S): 100 INJECTION SUBCUTANEOUS at 12:24

## 2021-02-22 NOTE — PROGRESS NOTE ADULT - PROBLEM SELECTOR PLAN 2
- Pt has PMHx of DM previously on metformin/ insulin 2 mo's ago, not currently  - RBS- 86  - C/W HSS  - A1C 6.2 - prediabetic, will need metformin on d/c, PCP follow up - Pt has PMHx of DM previously on metformin/ insulin 2 mo's ago, not currently  - RBS- 86  - C/W HSS  - A1C 6.2 - prediabetic,  PCP follow up

## 2021-02-22 NOTE — PROGRESS NOTE ADULT - SUBJECTIVE AND OBJECTIVE BOX
Podiatry progress: 6o y/o male s/p medial column fusion,STJ fusion,CHRIS  2/18/21. Patient seen resting comfortably in bed. Patient notes mild numbness to his LLE. Patient denies fever overnight . He denies chills, nausea, vomiting ,SOB. He denies any pain on his calves.    Medications acetaminophen   Tablet .. 650 milliGRAM(s) Oral every 6 hours PRN  atorvastatin 20 milliGRAM(s) Oral at bedtime  enoxaparin Injectable 40 milliGRAM(s) SubCutaneous daily  folic acid 1 milliGRAM(s) Oral daily  insulin lispro (ADMELOG) corrective regimen sliding scale   SubCutaneous three times a day before meals  oxycodone    5 mG/acetaminophen 325 mG 1 Tablet(s) Oral every 6 hours PRN  pantoprazole    Tablet 40 milliGRAM(s) Oral before breakfast  polyethylene glycol 3350 17 Gram(s) Oral daily  senna 2 Tablet(s) Oral at bedtime  sodium chloride 0.9% lock flush 3 milliLiter(s) IV Push every 8 hours    FHKnown health problems: none    No pertinent family history in first degree relatives    ,   PMHCharcot&#x27;s joint, left ankle and foot    Chronic GERD    Diabetic ulcer of right foot    Hyperlipidemia    Diabetes    No pertinent past medical history       PSHH/O amputation of lesser toe    H/O amputation of lesser toe, left    H/O Achilles tendon repair    No significant past surgical history        Labs                          8.8    10.43 )-----------( 217      ( 22 Feb 2021 07:46 )             27.3      02-22    135  |  104  |  22<H>  ----------------------------<  149<H>  4.3   |  23  |  1.32<H>    Ca    9.2      22 Feb 2021 07:46       Vital Signs Last 24 Hrs  T(C): 36.5 (22 Feb 2021 05:10), Max: 37 (21 Feb 2021 20:16)  T(F): 97.7 (22 Feb 2021 05:10), Max: 98.6 (21 Feb 2021 20:16)  HR: 81 (22 Feb 2021 05:10) (80 - 83)  BP: 122/72 (22 Feb 2021 05:10) (101/62 - 122/72)  BP(mean): --  RR: 18 (22 Feb 2021 05:10) (17 - 18)  SpO2: 99% (22 Feb 2021 05:10) (97% - 100%)          WBC Count: 10.43 K/uL (02-22-21 @ 07:46)                Physical exam left foot  post op 2/21/21:  Vasc: DP/PT 2/4,CFT <3secx5,No erythema, No edema  Derm: suture is well coapted and intact, no dehiscence  Neuro: protective sensation intact  MSK: patient was able to wiggle his toes            ---------------------------------------------------------------------------------------------------------  Physical exam Left foot: Posterior splint left intact.   Vasc: CFT < 3 sec to all digits. No erythema or edema noted  Neuro: Protective sensation grossly intact to all digits  MSK: patient was able to wiggle his toes

## 2021-02-22 NOTE — DISCHARGE NOTE NURSING/CASE MANAGEMENT/SOCIAL WORK - PATIENT PORTAL LINK FT
You can access the FollowMyHealth Patient Portal offered by [facility] by registering at the following website: [email or web address]. By joining [facility]’s FollowMyHealth portal, you will also be able to view your health information using other applications (apps) compatible with our system.

## 2021-02-22 NOTE — PROGRESS NOTE ADULT - PROBLEM SELECTOR PLAN 1
- S/p surgical repair for the L Charcot foot 2/18   - Had undergone 5th toe amputation R side in 2020  - Pt admitted to medicine floor for Post OP monitoring and pain medications.   - C/W pain meds- Percocet with bowel regimen (miralax, senna)  - C/w DVT ppx  - PT - Home with home PT - S/p surgical repair for the L Charcot foot 2/18   - Had undergone 5th toe amputation R side in 2020  - Pt admitted to medicine floor for Post OP monitoring and pain medications.   - C/W pain meds- Percocet with bowel regimen (miralax, senna)  - C/w DVT ppx  - PT - Home with home PT  - outpatient podiatry follow up.

## 2021-02-22 NOTE — PROGRESS NOTE ADULT - PROBLEM SELECTOR PROBLEM 1
Charcot's joint, left ankle and foot

## 2021-02-22 NOTE — CONSULT NOTE ADULT - SUBJECTIVE AND OBJECTIVE BOX
HPI/Course in Hospital:  60M from home, walks with special boots,  now at bed rest with PMH of DM (not on meds), HLD, CKD, S/P  5th toe R foot amputation in 2020 along with foot ulcer admitted  for surgical repair for the Charcot foot. Pt was hospitalized here in 2020 and had  undergone surgery for R heal foot ulcer. Since last year, Pt noted gradual onset of swellling and pain in the L foot and  with diffuculty ambulating b/o left foot . Pt follows   OP and was recommended to get a surgical repair. Pt had surgical repair with Achilles tendon lengthening, Subtalar joint fusion, Talonavicular fusion, NC fusion, and 1st met cuneiform fusion with application of posterior splint   by the Podiatry team. Pt admitted to medicine floor for Post OP monitoring and pain medications.Developed elevated temp  to 101.9 and had an additional increased temp   to 100.9. Given 1 dose each of vanco and pip/tazo .         PAST MEDICAL & SURGICAL HISTORY:    Chronic GERD    Diabetic ulcer of right foot  s/p amputation right foot 5th digit 3/2020    Hyperlipidemia    Diabetes    H/O amputation of lesser toe  right 5th toe    H/O Achilles tendon repair        MEDS:  acetaminophen   Tablet .. 650 milliGRAM(s) Oral every 6 hours PRN  atorvastatin 20 milliGRAM(s) Oral at bedtime  enoxaparin Injectable 40 milliGRAM(s) SubCutaneous daily  folic acid 1 milliGRAM(s) Oral daily  insulin lispro (ADMELOG) corrective regimen sliding scale   SubCutaneous three times a day before meals  oxycodone    5 mG/acetaminophen 325 mG 1 Tablet(s) Oral every 6 hours PRN  pantoprazole    Tablet 40 milliGRAM(s) Oral before breakfast  polyethylene glycol 3350 17 Gram(s) Oral daily  senna 2 Tablet(s) Oral at bedtime  sodium chloride 0.9% lock flush 3 milliLiter(s) IV Push every 8 hours      ALLERGIES  No Known Allergies      SOCIAL HISTORY:   quit smoking 2 yrs ago, alcohol occasionally; quit cocaine/pots "many" year ago.       FAMILY HISTORY:  Known health problems: none      ROS:    General:	    Skin:  	  HEENT:    Respiratory and Thorax:  	  Cardiovascular:	    Gastrointestinal:	    Genitourinary:	    Musculoskeletal:	    Neurological:	    Psychiatric:	    Hematology/Lymphatics:	    Endocrine:	    PHYSICAL EXAM:    Vital Signs Last 24 Hrs  T(C): 36.5 (2021 05:10), Max: 37 (2021 20:16)  T(F): 97.7 (2021 05:10), Max: 98.6 (2021 20:16)  HR: 81 (2021 05:10) (80 - 83)  BP: 122/72 (2021 05:10) (101/62 - 122/72)  BP(mean): --  RR: 18 (2021 05:10) (17 - 18)  SpO2: 99% (2021 05:10) (97% - 100%)      Gen:    HEENT:    Neck:    Lymph Nodes:    Breasts:    Back:    Chest/Thorax:    Cardiovascular:    Gastrointestinal:    Genitourinary:    Rectal:    Extremities:    Vascular:    Neurological:    Skin:    Psychiatric:    LABS/DIAGNOSTIC TESTS:                          8.8    10.43 )-----------( 217      ( 2021 07:46 )             27.3     WBC Count: 10.43 K/uL ( @ 07:46)  WBC Count: 10.57 K/uL ( @ 22:17)  WBC Count: 11.01 K/uL ( @ 07:07)          135  |  104  |  22<H>  ----------------------------<  149<H>  4.3   |  23  |  1.32<H>    Ca    9.2      2021 07:46        Urinalysis Basic - ( 2021 11:21 )    Color: Yellow / Appearance: Clear / S.015 / pH: x  Gluc: x / Ketone: Negative  / Bili: Negative / Urobili: Negative   Blood: x / Protein: 100 / Nitrite: Negative   Leuk Esterase: Negative / RBC: 2-5 /HPF / WBC 0-2 /HPF   Sq Epi: x / Non Sq Epi: Few /HPF / Bacteria: Few /HPF       CULTURES:     Culture - Blood (collected 21 @ 02:45)  Source: .Blood Blood-Peripheral  Preliminary Report (21 @ 03:02):    No growth to date.    Culture - Blood (collected 21 @ 02:45)  Source: .Blood Blood-Peripheral  Preliminary Report (21 @ 03:02):    No growth to date.        RADIOLOGY  < from: Xray Chest 1 View- PORTABLE-Urgent (Xray Chest 1 View- PORTABLE-Urgent .) (21 @ 23:15) >    EXAM:  XR CHEST PORTABLE URGENT 1V                            PROCEDURE DATE:  2021          INTERPRETATION:  AP chest on 2021 11:11 PM. Patient has a left ankle Charcot joint.    Heart is normal for projection.    Present film shows slight linear atelectasis at left base new since February 3 of this year.    IMPRESSION: Slight left base linear atelectasis.      < end of copied text >                 HPI/Course in Hospital:  60M from home, walks with special boots,  now at bed rest with PMH of DM (not on meds), HLD, CKD, S/P  5th toe R foot amputation in 2020 along with foot ulcer admitted  for surgical repair for the Charcot foot. Pt was hospitalized here in 2020 and had  undergone surgery for R heal foot ulcer. Since last year, Pt noted gradual onset of swellling and pain in the L foot and  with diffuculty ambulating b/o left foot . Pt follows   OP and was recommended to get a surgical repair. Pt had surgical repair with Achilles tendon lengthening, Subtalar joint fusion, Talonavicular fusion, NC fusion, and 1st met cuneiform fusion with application of posterior splint   by the Podiatry team. Developed elevated temp  to 101.9 and had an additional increased temp   to 100.9. Given 1 dose each of vanco and pip/tazo .     Pt feeling well.      PAST MEDICAL & SURGICAL HISTORY:    Chronic GERD    Diabetic ulcer of right foot  s/p amputation right foot 5th digit 3/2020    Hyperlipidemia    Diabetes        MEDS:  acetaminophen   Tablet .. 650 milliGRAM(s) Oral every 6 hours PRN  atorvastatin 20 milliGRAM(s) Oral at bedtime  enoxaparin Injectable 40 milliGRAM(s) SubCutaneous daily  folic acid 1 milliGRAM(s) Oral daily  insulin lispro (ADMELOG) corrective regimen sliding scale   SubCutaneous three times a day before meals  oxycodone    5 mG/acetaminophen 325 mG 1 Tablet(s) Oral every 6 hours PRN  pantoprazole    Tablet 40 milliGRAM(s) Oral before breakfast  polyethylene glycol 3350 17 Gram(s) Oral daily  senna 2 Tablet(s) Oral at bedtime  sodium chloride 0.9% lock flush 3 milliLiter(s) IV Push every 8 hours      ALLERGIES  No Known Allergies      SOCIAL HISTORY:   quit smoking 2 yrs ago, alcohol occasionally; quit cocaine/pots "many" year ago; lives alone; works as a fire/ in an office in [place]      FAMILY HISTORY:  Known health problems: none      ROS:     General: feeling well    Skin: no complaints  	  HEENT: no complaints    Respiratory and Thorax: no SOB, cough  	  Cardiovascular:	no CP, SOB    Gastrointestinal:	no N, V, diarrhea, abd pain    Genitourinary:	no complaints    Musculoskeletal:	 L shoulder pain with limited ROM    Neurological:	no complaints    Psychiatric: no complaints    Endocrine:	CM      PHYSICAL EXAM:    Vital Signs Last 24 Hrs  T(C): 36.5 (2021 05:10), Max: 37 (2021 20:16)  T(F): 97.7 (2021 05:10), Max: 98.6 (2021 20:16)  HR: 81 (2021 05:10) (80 - 83)  BP: 122/72 (2021 05:10) (101/62 - 122/72)  BP(mean): --  RR: 18 (2021 05:10) (17 - 18)  SpO2: 99% (2021 05:10) (97% - 100%)      Gen: WDWN W male in NAD    HEENT: NC/AT; EOMI; conjunctivae clear; mouth--good oral hygiene    Neck: FROM    Lymph Nodes: no enlarged submand, ant/post, supraclav LNs appreciated    Back: no vertebral or CVAT    Chest/Thorax: clear to auscultation    Cardiovascular: S1S2 reg with no murmurs, gallops, rubs    Gastrointestinal: BS active; soft and non-tender to palpation    Genitourinary: no catheter in place    EXTREMITIES:  LUE limited ROM L shoulder  RLE: dorsum of foot with scar; 5th toe amputation site healed; no edema or erythema  LLE: 2 long incision sites with sutures, one along medial malleolus and another along the lateral aspect of the foot; no drainage or erythema noted along either incision site; foot swollen    Neurological: A+O x3; Cr. n grossly intact    Psychiatric: affect appropriate      LABS/DIAGNOSTIC TESTS:                          8.8    10.43 )-----------( 217      ( 2021 07:46 )             27.3     WBC Count: 10.43 K/uL ( @ 07:46)  WBC Count: 10.57 K/uL ( @ 22:17)  WBC Count: 11.01 K/uL ( @ 07:07)          135  |  104  |  22<H>  ----------------------------<  149<H>  4.3   |  23  |  1.32<H>    Ca    9.2      2021 07:46        Urinalysis Basic - ( 2021 11:21 )    Color: Yellow / Appearance: Clear / S.015 / pH: x  Gluc: x / Ketone: Negative  / Bili: Negative / Urobili: Negative   Blood: x / Protein: 100 / Nitrite: Negative   Leuk Esterase: Negative / RBC: 2-5 /HPF / WBC 0-2 /HPF   Sq Epi: x / Non Sq Epi: Few /HPF / Bacteria: Few /HPF       CULTURES:     Culture - Blood (collected 21 @ 02:45)  Source: .Blood Blood-Peripheral  Preliminary Report (21 @ 03:02):    No growth to date.    Culture - Blood (collected 21 @ 02:45)  Source: .Blood Blood-Peripheral  Preliminary Report (21 @ 03:02):    No growth to date.        RADIOLOGY  < from: Xray Chest 1 View- PORTABLE-Urgent (Xray Chest 1 View- PORTABLE-Urgent .) (21 @ 23:15) >    EXAM:  XR CHEST PORTABLE URGENT 1V                            PROCEDURE DATE:  2021          INTERPRETATION:  AP chest on 2021 11:11 PM. Patient has a left ankle Charcot joint.    Heart is normal for projection.    Present film shows slight linear atelectasis at left base new since February 3 of this year.    IMPRESSION: Slight left base linear atelectasis.

## 2021-02-22 NOTE — PROGRESS NOTE ADULT - PROVIDER SPECIALTY LIST ADULT
Internal Medicine
Podiatry
Internal Medicine

## 2021-02-22 NOTE — PROGRESS NOTE ADULT - SUBJECTIVE AND OBJECTIVE BOX
PGY-1 Progress Note discussed with attending    PAGER #: [[phone]] TILL 5:00 PM  PLEASE CONTACT ON CALL TEAM:  - On Call Team (Please refer to Darius) FROM 5:00 PM - 8:30PM  - Nightfloat Team FROM 8:30 -7:30 AM    CHIEF COMPLAINT & BRIEF HOSPITAL COURSE:      INTERVAL HPI/OVERNIGHT EVENTS:       REVIEW OF SYSTEMS:  CONSTITUTIONAL: No fever, weight loss, or fatigue  RESPIRATORY: No cough, wheezing, chills or hemoptysis; No shortness of breath  CARDIOVASCULAR: No chest pain, palpitations, dizziness, or leg swelling  GASTROINTESTINAL: No abdominal pain. No nausea, vomiting, or hematemesis; No diarrhea or constipation. No melena or hematochezia.  GENITOURINARY: No dysuria or hematuria, urinary frequency  MUSCULOSKELETAL: No pain, no Limited ROM  NEUROLOGICAL: No headaches, memory loss, loss of strength, numbness, or tremors  SKIN: No itching, burning, rashes, or lesions     MEDICATIONS  (STANDING):  atorvastatin 20 milliGRAM(s) Oral at bedtime  enoxaparin Injectable 40 milliGRAM(s) SubCutaneous daily  folic acid 1 milliGRAM(s) Oral daily  insulin lispro (ADMELOG) corrective regimen sliding scale   SubCutaneous three times a day before meals  pantoprazole    Tablet 40 milliGRAM(s) Oral before breakfast  polyethylene glycol 3350 17 Gram(s) Oral daily  senna 2 Tablet(s) Oral at bedtime  sodium chloride 0.9% lock flush 3 milliLiter(s) IV Push every 8 hours    MEDICATIONS  (PRN):  acetaminophen   Tablet .. 650 milliGRAM(s) Oral every 6 hours PRN Temp greater or equal to 38C (100.4F), Mild Pain (1 - 3)  oxycodone    5 mG/acetaminophen 325 mG 1 Tablet(s) Oral every 6 hours PRN Severe Pain (7 - 10)      Vital Signs Last 24 Hrs  T(C): 36.5 (22 Feb 2021 05:10), Max: 37 (21 Feb 2021 20:16)  T(F): 97.7 (22 Feb 2021 05:10), Max: 98.6 (21 Feb 2021 20:16)  HR: 81 (22 Feb 2021 05:10) (80 - 83)  BP: 122/72 (22 Feb 2021 05:10) (101/62 - 122/72)  BP(mean): --  RR: 18 (22 Feb 2021 05:10) (17 - 18)  SpO2: 99% (22 Feb 2021 05:10) (97% - 100%)    PHYSICAL EXAMINATION:  GENERAL: NAD, well built  HEAD:  Atraumatic, Normocephalic  EYES:  conjunctiva and sclera clear, no scleral icterus  NECK: Supple, No JVD, Normal thyroid  CHEST/LUNG: Clear to auscultation.  No rales, rhonchi, wheezing, or rubs  HEART: Regular rate and rhythm; No murmurs, rubs, or gallops  ABDOMEN: Soft, Nontender, Nondistended; Bowel sounds present  NERVOUS SYSTEM:  Alert & Oriented X3,  Strength 5/5 in upper and lower extremities, sensation intact  EXTREMITIES:  2+ Peripheral Pulses, No clubbing, cyanosis, or edema  SKIN: warm dry, no lesions noted                          8.8    10.43 )-----------( 217      ( 22 Feb 2021 07:46 )             27.3     02-22    135  |  104  |  22<H>  ----------------------------<  149<H>  4.3   |  23  |  1.32<H>    Ca    9.2      22 Feb 2021 07:46              I&O's Summary      Culture - Blood (collected 21 Feb 2021 02:45)  Source: .Blood Blood-Peripheral  Preliminary Report (22 Feb 2021 03:02):    No growth to date.    Culture - Blood (collected 21 Feb 2021 02:45)  Source: .Blood Blood-Peripheral  Preliminary Report (22 Feb 2021 03:02):    No growth to date.        RADIOLOGY & ADDITIONAL TESTS:                   PGY-1 Progress Note discussed with attending    PAGER #: [[phone]] TILL 5:00 PM  PLEASE CONTACT ON CALL TEAM:  - On Call Team (Please refer to Darius) FROM 5:00 PM - 8:30PM  - Nightfloat Team FROM 8:30 -7:30 AM    CHIEF COMPLAINT & BRIEF HOSPITAL COURSE:  60M from home PMHx of DM (not on meds), HLD, CKD, S/P R foot 5th toe amputation in March 2020 along with foot ulcer was admitted for surgical repair for the Charcot foot. Pt was hospitalized in June 2020 and had undergone surgery with R healed foot ulcer. Since last year, Pt noted gradual onset of swelling and pain in the L foot; he found it difficult to ambulate. Pt follows with podiatry Dr. Eng OP and he underwent Tendoachilles lengthening, Subtalar joint fusion, Talonavicular fusion, NC fusion, and 1st met cuneiform fusion with application of posterior splint 2/18. Pt endorses mild L shoulder pain but reports that he has OA, and is following with orthopedics for further management. Pt was admitted to medicine floor for post operative monitoring and pain medications. Pt was observed for 24 hours post procedure, labs were evaluated to be wnl, and pain control was adequate. He was evaluated by PT and found to have some impairments for which he can go home with home PT.    INTERVAL HPI/OVERNIGHT EVENTS:   Patient seen and examined at bedside, he denies any complains, feels well, hasn't had any more fevers since 2/20, patient was evaluated by ID Dr. Eugene, there is no indication for abx at this time. Patient is stable for discharge, will need follow up with his podiatry for monitoring of his foot wound.     REVIEW OF SYSTEMS:  CONSTITUTIONAL: No fever, weight loss, or fatigue  RESPIRATORY: No cough, wheezing, chills or hemoptysis; No shortness of breath  CARDIOVASCULAR: No chest pain, palpitations, dizziness, or leg swelling  GASTROINTESTINAL: No abdominal pain. No nausea, vomiting, or hematemesis; No diarrhea or constipation. No melena or hematochezia.  GENITOURINARY: No dysuria or hematuria, urinary frequency  MUSCULOSKELETAL: No pain, no Limited ROM  NEUROLOGICAL: No headaches, memory loss, loss of strength, numbness, or tremors  SKIN: No itching, burning, rashes, or lesions     MEDICATIONS  (STANDING):  atorvastatin 20 milliGRAM(s) Oral at bedtime  enoxaparin Injectable 40 milliGRAM(s) SubCutaneous daily  folic acid 1 milliGRAM(s) Oral daily  insulin lispro (ADMELOG) corrective regimen sliding scale   SubCutaneous three times a day before meals  pantoprazole    Tablet 40 milliGRAM(s) Oral before breakfast  polyethylene glycol 3350 17 Gram(s) Oral daily  senna 2 Tablet(s) Oral at bedtime  sodium chloride 0.9% lock flush 3 milliLiter(s) IV Push every 8 hours    MEDICATIONS  (PRN):  acetaminophen   Tablet .. 650 milliGRAM(s) Oral every 6 hours PRN Temp greater or equal to 38C (100.4F), Mild Pain (1 - 3)  oxycodone    5 mG/acetaminophen 325 mG 1 Tablet(s) Oral every 6 hours PRN Severe Pain (7 - 10)      Vital Signs Last 24 Hrs  T(C): 36.5 (22 Feb 2021 05:10), Max: 37 (21 Feb 2021 20:16)  T(F): 97.7 (22 Feb 2021 05:10), Max: 98.6 (21 Feb 2021 20:16)  HR: 81 (22 Feb 2021 05:10) (80 - 83)  BP: 122/72 (22 Feb 2021 05:10) (101/62 - 122/72)  BP(mean): --  RR: 18 (22 Feb 2021 05:10) (17 - 18)  SpO2: 99% (22 Feb 2021 05:10) (97% - 100%)    PHYSICAL EXAMINATION:  GENERAL: NAD, well built, laying comfortably in bed  HEAD:  Atraumatic, Normocephalic  EYES:  conjunctiva and sclera clear, no scleral icterus  NECK: Supple, No JVD, Normal thyroid  CHEST/LUNG: Clear to auscultation.  No rales, rhonchi, wheezing, or rubs appreciated  HEART: Regular rate and rhythm; No murmurs, rubs, or gallops, positive S1 and S2.  ABDOMEN: Soft, Nontender, Nondistended; Bowel sounds present  NERVOUS SYSTEM:  Alert & Oriented X3,  Strength 5/5 in upper and lower extremities, sensation intact  EXTREMITIES:  2+ Peripheral Pulses, No clubbing, cyanosis, or edema, Left foot covered with clean deresing and ace wrap from foot to below the knee, no saturation noted, pt able to move toes without difficulty. good capillary reflex.   SKIN: warm dry, no lesions noted,, surgical wound on L foot, covered by clean dressing. As per Podiatry, no signs of infection from wound.                          8.8    10.43 )-----------( 217      ( 22 Feb 2021 07:46 )             27.3     02-22    135  |  104  |  22<H>  ----------------------------<  149<H>  4.3   |  23  |  1.32<H>    Ca    9.2      22 Feb 2021 07:46              I&O's Summary      Culture - Blood (collected 21 Feb 2021 02:45)  Source: .Blood Blood-Peripheral  Preliminary Report (22 Feb 2021 03:02):    No growth to date.    Culture - Blood (collected 21 Feb 2021 02:45)  Source: .Blood Blood-Peripheral  Preliminary Report (22 Feb 2021 03:02):    No growth to date.        RADIOLOGY & ADDITIONAL TESTS:

## 2021-02-22 NOTE — CONSULT NOTE ADULT - ASSESSMENT
INCOMPLETE NOTE--MAS 60M with PMH of DM (not on meds), HLD, CKD, S/P  5th toe R foot amputation and foot ulcer 3/20, admitted 2/18 for surgical repair for the Charcot foot. Patient underwent surgery 2/18, developed post-op fever, and given 1 dose of vanco and pip/tazo 2/20/21. Patient appears well with no further elevated temps. since 2/20.  Pt feeling well. WBC not elevated. L foot operative site with no evidence of infection. No antibiotic Rx is recommended at this time. F/U with Podiatry.    Please call again if needed.

## 2021-02-22 NOTE — PROGRESS NOTE ADULT - ASSESSMENT
Assessment  Left foot Charcot  Left foot s/p medial column fusion,STJ fusion,CHRIS      Plan:  Patient evaluated and chart reviewed  Vitamin D result reviewed  Recommended vitamin D supplement after discharge  keep dressing and posterior splint intact  Advised patient to keep his left foot NWB  Advised patient to keep his dressing clean, dry and intact till next visit with Dr. Eng  Patient needs to follow up with Dr. Eng next week  Patient is stable for discharge from podiatry standpoint.  Patient seen and evaluated  with attending

## 2021-02-22 NOTE — PROGRESS NOTE ADULT - REASON FOR ADMISSION
Charcot foot repair

## 2021-02-26 LAB
CULTURE RESULTS: SIGNIFICANT CHANGE UP
CULTURE RESULTS: SIGNIFICANT CHANGE UP
SPECIMEN SOURCE: SIGNIFICANT CHANGE UP
SPECIMEN SOURCE: SIGNIFICANT CHANGE UP

## 2022-04-19 NOTE — H&P PST ADULT - BACK
Patient here for [facility] today. Wife requests Lab draw done from Dr Glen Church today. BMP drawn then sent to lab,    Patient denies any dental issues or Jaw pain. No dental procedures scheduled  Xgeva given in right lower abdomen. Tolerated injection well. No bleeding noted. Site left open to air. Discharged ambulatory with cane, with steady gait. AVS printed with a future appointment made.
detailed exam

## 2022-06-29 PROBLEM — M14.672 CHARCOT'S JOINT, LEFT ANKLE AND FOOT: Chronic | Status: ACTIVE | Noted: 2021-02-03

## 2022-07-06 NOTE — H&P PST ADULT - TEMPERATURE IN CELSIUS (DEGREES C)
Griseofulvin Counseling:  I discussed with the patient the risks of griseofulvin including but not limited to photosensitivity, cytopenia, liver damage, nausea/vomiting and severe allergy.  The patient understands that this medication is best absorbed when taken with a fatty meal (e.g., ice cream or french fries). 37

## 2022-07-12 ENCOUNTER — OUTPATIENT (OUTPATIENT)
Dept: OUTPATIENT SERVICES | Facility: HOSPITAL | Age: 62
LOS: 1 days | End: 2022-07-12
Payer: COMMERCIAL

## 2022-07-12 ENCOUNTER — RESULT REVIEW (OUTPATIENT)
Age: 62
End: 2022-07-12

## 2022-07-12 VITALS
RESPIRATION RATE: 18 BRPM | HEART RATE: 72 BPM | OXYGEN SATURATION: 99 % | HEIGHT: 73 IN | DIASTOLIC BLOOD PRESSURE: 75 MMHG | SYSTOLIC BLOOD PRESSURE: 121 MMHG | WEIGHT: 229.94 LBS | TEMPERATURE: 98 F

## 2022-07-12 DIAGNOSIS — Z01.818 ENCOUNTER FOR OTHER PREPROCEDURAL EXAMINATION: ICD-10-CM

## 2022-07-12 DIAGNOSIS — N18.9 CHRONIC KIDNEY DISEASE, UNSPECIFIED: ICD-10-CM

## 2022-07-12 DIAGNOSIS — E11.9 TYPE 2 DIABETES MELLITUS WITHOUT COMPLICATIONS: ICD-10-CM

## 2022-07-12 DIAGNOSIS — T84.223A DISPLACEMENT OF INTERNAL FIXATION DEVICE OF BONES OF FOOT AND TOES, INITIAL ENCOUNTER: ICD-10-CM

## 2022-07-12 DIAGNOSIS — M67.00 SHORT ACHILLES TENDON (ACQUIRED), UNSPECIFIED ANKLE: ICD-10-CM

## 2022-07-12 DIAGNOSIS — M14.672 CHARCOT'S JOINT, LEFT ANKLE AND FOOT: ICD-10-CM

## 2022-07-12 DIAGNOSIS — T84.223S: ICD-10-CM

## 2022-07-12 DIAGNOSIS — E78.5 HYPERLIPIDEMIA, UNSPECIFIED: ICD-10-CM

## 2022-07-12 DIAGNOSIS — Z89.429 ACQUIRED ABSENCE OF OTHER TOE(S), UNSPECIFIED SIDE: Chronic | ICD-10-CM

## 2022-07-12 DIAGNOSIS — Z98.890 OTHER SPECIFIED POSTPROCEDURAL STATES: Chronic | ICD-10-CM

## 2022-07-12 PROCEDURE — 73620 X-RAY EXAM OF FOOT: CPT

## 2022-07-12 PROCEDURE — G0463: CPT

## 2022-07-12 PROCEDURE — 73620 X-RAY EXAM OF FOOT: CPT | Mod: 26,LT

## 2022-07-12 NOTE — H&P PST ADULT - FALL HARM RISK - UNIVERSAL INTERVENTIONS
Bed in lowest position, wheels locked, appropriate side rails in place/Call bell, personal items and telephone in reach/Instruct patient to call for assistance before getting out of bed or chair/Non-slip footwear when patient is out of bed/[place] to call system/Physically safe environment - no spills, clutter or unnecessary equipment/Purposeful Proactive Rounding/Room/bathroom lighting operational, light cord in reach

## 2022-07-12 NOTE — H&P PST ADULT - PROBLEM SELECTOR PLAN 1
Pt is scheduled for removal of failed hardware, left fusion metatarsal uniform Joint, left fusion talonavicular gastroc recession left on 7/18/2022.  Pt optimized for surgery by PCP.  Preoperative instructions discussed with pt and given to pt. Instructed pt to notify security when he arrives in the lobby of the hospital that he is here for surgery, that he will need someone to come to the hospital to pick him up after surgery, not to eat or drink anything after midnight the night before the surgery, to avoid NSAIDs such as Ibuprofen, Motrin, Aleve, Advil, naproxen before surgery, to take Tylenol if needed for pain, to report if he has been exposed to anyone with any contagious diseases including Covid-19 or if he is exhibiting any symptoms of COVID-19,  to keep appointment for COVID-19  test 3 days before surgery.  Instructed about use of Chlorhexidine 4% soap for 3 days before surgery including the morning of the surgery to prevent infection. Verbalized understanding of instructions given.   TESSA stop bang score 3. Pt denies history of TESSA, never did sleep study.

## 2022-07-12 NOTE — H&P PST ADULT - MUSCULOSKELETAL
left foot/decreased ROM due to pain/strength 5/5 bilateral upper extremities/strength 5/5 bilateral lower extremities negative left foot DEFORMITY/decreased ROM due to pain/joint swelling/strength 5/5 bilateral upper extremities details… left foot deformity/decreased ROM due to pain/joint swelling/strength 5/5 bilateral upper extremities

## 2022-07-12 NOTE — H&P PST ADULT - NSICDXPASTMEDICALHX_GEN_ALL_CORE_FT
PAST MEDICAL HISTORY:  Charcot's joint, left ankle and foot     Chronic GERD     CKD (chronic kidney disease)     Diabetes     Diabetic ulcer of right foot s/p amputation right foot 5th digit 3/2020    Hyperlipidemia      PAST MEDICAL HISTORY:  Charcot's joint, left ankle and foot     Chronic GERD     CKD (chronic kidney disease)     Diabetes     Diabetic ulcer of right foot s/p amputation right foot 5th digit 3/2020    Displacement of internal fixation device of bones of foot and toes, initial encounter     Hyperlipidemia     Short Achilles tendon (acquired), unspecified ankle

## 2022-07-12 NOTE — H&P PST ADULT - HISTORY OF PRESENT ILLNESS
This is a 61 year old male with PMH of prediabetes-last Hemoglobin A1C 6.3, HLD, CKD-last EGFR 51, creatinine 1.46 on 6/22/2022 who presents with c/o left foot pain due to failed hardware.  Pt is scheduled for removal of failed hardware, left  This is a 61 year old male with PMH of prediabetes-last Hemoglobin A1C 6.3 on 6/20/2022, HLD, CKD-last EGFR 51, creatinine 1.46 on 6/20/2022, PSH of left foot subtalar joint fusion, left foot talonavicular fusion, left foot fusion of the first metatarsal cuneiform, left foot fusion of the navicular cuneiform joint, tendo Achilles lengthening on 2/18/2021 who presents with c/o left foot pain due to failed hardware.  Pt is scheduled for removal of failed hardware, left fusion metatarsal uniform Joint, left fusion talonavicular gastroc recession left on 7/18/2022.

## 2022-07-12 NOTE — H&P PST ADULT - ATTENDING COMMENTS
pt consents for hardware removal and stabilization of foot with internal fixation   answered all questions   no guarantees given or implied   risks of failure discussed

## 2022-07-12 NOTE — H&P PST ADULT - ASSESSMENT
This is a 61 year old male with PMH of Diabetes-last Hemoglobin A1C 6.3on 6/22/2022, HLD, CKD-last EGFR 51, creatinine 1.46 on 6/22/2022, PSH of left foot subtalar joint fusion, left foot talonavicular fusion, left foot fusion of the first metatarsal cuneiform, left foot fusion of the navicular cuneiform joint, tendo Achilles lengthening on 2/18/2021 who presents with displacement of internal fixation device of bones of foot and toes, acquired short achilles tendon, charcot's Joint of left ankle and foot.  Pt is scheduled for removal of failed hardware, left fusion metatarsal uniform Joint, left fusion talonavicular gastroc recession left on 7/18/2022.  TESSA stop bang score 3. Pt denies history of TESSA, never did sleep study.

## 2022-07-12 NOTE — H&P PST ADULT - NEGATIVE OPHTHALMOLOGIC SYMPTOMS
no lacrimation L/no lacrimation R/no blurred vision L/no blurred vision R/no discharge L/no discharge R

## 2022-07-12 NOTE — H&P PST ADULT - PROBLEM SELECTOR PLAN 2
last Hemoglobin A1C 6.3 ON 6/22/2022, not on any meds.  Perioperative glucose monitoring and coverage as needed. Follow-up with PCP for diabetic management.

## 2022-07-17 ENCOUNTER — TRANSCRIPTION ENCOUNTER (OUTPATIENT)
Age: 62
End: 2022-07-17

## 2022-07-18 ENCOUNTER — OUTPATIENT (OUTPATIENT)
Dept: OUTPATIENT SERVICES | Facility: HOSPITAL | Age: 62
LOS: 1 days | End: 2022-07-18
Payer: COMMERCIAL

## 2022-07-18 ENCOUNTER — TRANSCRIPTION ENCOUNTER (OUTPATIENT)
Age: 62
End: 2022-07-18

## 2022-07-18 ENCOUNTER — RESULT REVIEW (OUTPATIENT)
Age: 62
End: 2022-07-18

## 2022-07-18 VITALS
RESPIRATION RATE: 16 BRPM | HEART RATE: 78 BPM | OXYGEN SATURATION: 96 % | TEMPERATURE: 98 F | SYSTOLIC BLOOD PRESSURE: 118 MMHG | DIASTOLIC BLOOD PRESSURE: 76 MMHG

## 2022-07-18 VITALS
HEIGHT: 73 IN | SYSTOLIC BLOOD PRESSURE: 121 MMHG | DIASTOLIC BLOOD PRESSURE: 80 MMHG | TEMPERATURE: 98 F | RESPIRATION RATE: 16 BRPM | WEIGHT: 250 LBS | HEART RATE: 72 BPM | OXYGEN SATURATION: 100 %

## 2022-07-18 DIAGNOSIS — T84.223A DISPLACEMENT OF INTERNAL FIXATION DEVICE OF BONES OF FOOT AND TOES, INITIAL ENCOUNTER: ICD-10-CM

## 2022-07-18 DIAGNOSIS — M67.00 SHORT ACHILLES TENDON (ACQUIRED), UNSPECIFIED ANKLE: ICD-10-CM

## 2022-07-18 DIAGNOSIS — Z89.429 ACQUIRED ABSENCE OF OTHER TOE(S), UNSPECIFIED SIDE: Chronic | ICD-10-CM

## 2022-07-18 DIAGNOSIS — M14.672 CHARCOT'S JOINT, LEFT ANKLE AND FOOT: ICD-10-CM

## 2022-07-18 DIAGNOSIS — Z98.890 OTHER SPECIFIED POSTPROCEDURAL STATES: Chronic | ICD-10-CM

## 2022-07-18 LAB
GLUCOSE BLDC GLUCOMTR-MCNC: 107 MG/DL — HIGH (ref 70–99)
GLUCOSE BLDC GLUCOMTR-MCNC: 96 MG/DL — SIGNIFICANT CHANGE UP (ref 70–99)

## 2022-07-18 PROCEDURE — 73630 X-RAY EXAM OF FOOT: CPT | Mod: 26,LT

## 2022-07-18 PROCEDURE — 97161 PT EVAL LOW COMPLEX 20 MIN: CPT

## 2022-07-18 PROCEDURE — 73630 X-RAY EXAM OF FOOT: CPT

## 2022-07-18 PROCEDURE — 20680 REMOVAL OF IMPLANT DEEP: CPT

## 2022-07-18 PROCEDURE — 82962 GLUCOSE BLOOD TEST: CPT

## 2022-07-18 PROCEDURE — C1713: CPT

## 2022-07-18 PROCEDURE — 28740 FUSION OF FOOT BONES: CPT | Mod: T3

## 2022-07-18 PROCEDURE — 28737 REVISION OF FOOT BONES: CPT | Mod: LT

## 2022-07-18 PROCEDURE — C1889: CPT

## 2022-07-18 DEVICE — IMPLANTABLE DEVICE
Type: IMPLANTABLE DEVICE | Site: LEFT | Status: NON-FUNCTIONAL
Removed: 2022-07-18

## 2022-07-18 DEVICE — IMP VITOSS BB TRUMA FOAM PACK 1.2CC
Type: IMPLANTABLE DEVICE | Site: LEFT | Status: NON-FUNCTIONAL
Removed: 2022-07-18

## 2022-07-18 RX ORDER — ACETAMINOPHEN 500 MG
1000 TABLET ORAL ONCE
Refills: 0 | Status: DISCONTINUED | OUTPATIENT
Start: 2022-07-18 | End: 2022-07-18

## 2022-07-18 RX ORDER — FENTANYL CITRATE 50 UG/ML
25 INJECTION INTRAVENOUS
Refills: 0 | Status: DISCONTINUED | OUTPATIENT
Start: 2022-07-18 | End: 2022-07-18

## 2022-07-18 RX ORDER — ACETAMINOPHEN 500 MG
2 TABLET ORAL
Qty: 0 | Refills: 0 | DISCHARGE

## 2022-07-18 RX ORDER — ATORVASTATIN CALCIUM 80 MG/1
1 TABLET, FILM COATED ORAL
Qty: 0 | Refills: 0 | DISCHARGE

## 2022-07-18 RX ORDER — FOLIC ACID 0.8 MG
1 TABLET ORAL
Qty: 0 | Refills: 0 | DISCHARGE

## 2022-07-18 RX ORDER — HYDROMORPHONE HYDROCHLORIDE 2 MG/ML
0.5 INJECTION INTRAMUSCULAR; INTRAVENOUS; SUBCUTANEOUS
Refills: 0 | Status: DISCONTINUED | OUTPATIENT
Start: 2022-07-18 | End: 2022-07-18

## 2022-07-18 RX ORDER — ONDANSETRON 8 MG/1
4 TABLET, FILM COATED ORAL ONCE
Refills: 0 | Status: DISCONTINUED | OUTPATIENT
Start: 2022-07-18 | End: 2022-07-18

## 2022-07-18 RX ORDER — SODIUM CHLORIDE 9 MG/ML
3 INJECTION INTRAMUSCULAR; INTRAVENOUS; SUBCUTANEOUS EVERY 8 HOURS
Refills: 0 | Status: DISCONTINUED | OUTPATIENT
Start: 2022-07-18 | End: 2022-07-18

## 2022-07-18 NOTE — PACU DISCHARGE NOTE - AIRWAY PATENCY:
Final Anesthesia Post-op Assessment    Patient: Sim Bates  Procedure(s) Performed: FLEXIBLE SIGMOIDOSCOPY  Anesthesia type: General    Vitals Value Taken Time   Temp 36 °C (96.8 °F) 12/09/21 1140   Pulse 75 12/09/21 1150   Resp 16 12/09/21 1150   SpO2 97 % 12/09/21 1150   /81 12/09/21 1150         Patient Location: PACU Phase 1  Post-op Vital Signs:stable  Level of Consciousness: awake and alert  Respiratory Status: spontaneous ventilation  Cardiovascular stable  Hydration: euvolemic  Pain Management: adequately controlled  Handoff: Handoff to receiving nurse was performed and questions were answered  Vomiting: none  Nausea: None  Airway Patency:patent  Post-op Assessment: no complications and patient tolerated procedure well with no complications      No complications documented.    Satisfactory

## 2022-07-18 NOTE — PHYSICAL THERAPY INITIAL EVALUATION ADULT - TRANSFER SKILLS, REHAB EVAL
independent Calcipotriene Pregnancy And Lactation Text: This medication has not been proven safe during pregnancy. It is unknown if this medication is excreted in breast milk.

## 2022-07-18 NOTE — PHYSICAL THERAPY INITIAL EVALUATION ADULT - GENERAL OBSERVATIONS, REHAB EVAL
Pt. received sitting up in recliner. Pt. is AxOX3, w/ IV line, (R) foot in soft cast. Pt. reports no pain at this time.

## 2022-07-18 NOTE — PHYSICAL THERAPY INITIAL EVALUATION ADULT - PERTINENT HX OF CURRENT PROBLEM, REHAB EVAL
Pt. is s/p (R) foot removal of failed hardware, fusion MTT uniform joint. Fusion Talonavicular and Gastroc Recession.

## 2022-07-18 NOTE — ASU DISCHARGE PLAN (ADULT/PEDIATRIC) - NS MD DC FALL RISK RISK
For information on Fall & Injury Prevention, visit: [email or web address] OR  [email or web address] OR  https://www.cdc.gov/steadi/patient.html

## 2022-07-18 NOTE — BRIEF OPERATIVE NOTE - NSICDXBRIEFPROCEDURE_GEN_ALL_CORE_FT
PROCEDURES:  Fusion of medial column of left foot 18-Jul-2022 14:01:54  Srinath Stevens  Fusion, tarsometatarsal joint, multiple 18-Jul-2022 14:03:04  Srinath Stevens  Removal, hardware, extremity 18-Jul-2022 14:03:19  Srinath Stevens

## 2022-07-18 NOTE — ASU DISCHARGE PLAN (ADULT/PEDIATRIC) - CARE PROVIDER_API CALL
Clemente Eng (DPM)  Foot Surgery; Recon RearfootAnkle Surgery  [address] [place] [address]  Phone: [phone]  Fax: [phone]  Follow Up Time:

## 2022-07-18 NOTE — ASU DISCHARGE PLAN (ADULT/PEDIATRIC) - CALL YOUR DOCTOR IF YOU HAVE ANY OF THE FOLLOWING:
Pain not relieved by Medications/Nausea and vomiting that does not stop Pain not relieved by Medications/Fever greater than (need to indicate Fahrenheit or Celsius)/Nausea and vomiting that does not stop

## 2022-07-18 NOTE — ASU PATIENT PROFILE, ADULT - NSICDXPASTMEDICALHX_GEN_ALL_CORE_FT
PAST MEDICAL HISTORY:  Charcot's joint, left ankle and foot     Chronic GERD     CKD (chronic kidney disease)     Diabetes     Diabetic ulcer of right foot s/p amputation right foot 5th digit 3/2020    Displacement of internal fixation device of bones of foot and toes, initial encounter     Hyperlipidemia     Short Achilles tendon (acquired), unspecified ankle

## 2022-07-18 NOTE — ASU PATIENT PROFILE, ADULT - FALL HARM RISK - UNIVERSAL INTERVENTIONS
Bed in lowest position, wheels locked, appropriate side rails in place/Call bell, personal items and telephone in reach/Instruct patient to call for assistance before getting out of bed or chair/Non-slip footwear when patient is out of bed/[place] to call system/Physically safe environment - no spills, clutter or unnecessary equipment/Purposeful Proactive Rounding/Room/bathroom lighting operational, light cord in reach

## 2022-07-18 NOTE — BRIEF OPERATIVE NOTE - OPERATION/FINDINGS
removal of nargis variax plate and screws   medial column beaming with styker 7.0 screw  lateral column beaming 5.0 screw  vitoss bone graft

## 2022-07-18 NOTE — BRIEF OPERATIVE NOTE - NSICDXBRIEFPOSTOP_GEN_ALL_CORE_FT
POST-OP DIAGNOSIS:  Diabetic Charcot foot 18-Jul-2022 14:03:46  Srinath Stevens  Charcot's joint 18-Jul-2022 14:04:01  Srinath Stevens

## 2022-11-01 NOTE — H&P PST ADULT - NSANTHSNORERD_ENT_A_CORE
November 1, 2022         No Recipients      Patient: Memo Campbell   YOB: 1929   Date of Visit: 11/1/2022       Dear Dr. Boss Recipients:    Thank you for referring Memo Campbell to me for evaluation. Below are my notes for this visit with him.    If you have questions, please do not hesitate to call me. I look forward to following your patient along with you.      Sincerely,        Danette Mccabe MD        CC:   No Recipients  Danette Mccabe MD  11/1/2022 12:21 PM  Sign when Signing Visit         [facility]  Danette Mccabe M.D.    [facility], [address], [facility]  [address] [facility], [place] [address]  [address] [place] [address] ,  [place], [place]  # [phone]  Fax #: [phone]    Cc Melanoma recurrence  Undergoing immunotherpy- Opdivo     HPI:   Memo Campbell is a 89 year old gentleman with recurrent melanoma post urgery ELENA, undergoing immunotherapy. He had locally recurrent  melanoma 2017 in his back and has previous history of nodular melanoma. Initial surgery was by Dr. Franco and Dr. Martin and underwent surgical resection followed by observation.  Subsequently, he did developed local recurrence in 2017 with a nodular lump in the same surgical scar site which gradually increased in size from March 2017 to July 2017.  He was evaluated by Dr. Dalton Schilling and underwent excision.  The pathology consistent with high-grade poorly differentiated malignant neoplasm with overall features suggestive of melanoma. He was under care of  Dr. Diego Hope closer to [place] and subsequently switched to [place].  Final pathology from [facility] demontrated positive margins. He was treated with local radiation and subsequently started immunotherapy with Pembrolizumab. He continued therapy in [place] and here and stopped treatment after 2 years. Subsequently he was lost to follow up. He presented back to see me in 5.2021 after a hiatus of 1.5 years and  has not had any oncologic follow up here or in [place]. CT requested 5.2021 demonstrated 4.9 cm left lower lobe lung mass as detailed below. CT/PET obtained is as below. He underwent biopsy through IR on 6.3 and pathology was consistent with metastatic melanoma.NGS and braf was reportedly negative. He started immunotherapy and after 6 cycles with radiological evidence of response he underwent metastectomy. Pathology demonstrtaed no evidence of disease.  CT PET dated 1/11/2022 that demonstrated FDG avid mediastinal adenopathy along with increased pleural effusions right more than left.  Follow-up CT PET demonstrated head and neck uptake as detailed below. ENT evaluation was recommended along with urology evaluation which he tells me was negative. He  completed recommended dermatology follow up as well and reports no concerns on skin exam. Follow up PET 10.13 is as below. He has had urology as well as ENT evaluation ( 9/27/22: Dr Orellana:  The direct laryngoscopy is performed in the following manner.  A rigid Dedo scope is used.  Dental guard is placed.  Examination demonstrated the posterior pharynx to be normal.  The base of tongue and vallecula otherwise normal.  Epiglottis is normal-appearing.  Examination into the laryngeal inlet demonstrated normal vocal folds without any evidence of lesions.  The examination was then performed of the hypopharynx bilaterally.  Examination left side demonstrated a patch of thin mucosa without any evidence of ulcerations or mass lesions.  This area corresponded to the previous biopsy/ablation site of the lesion.  Examination on the right side was also negative.  The scope was removed.     A rigid cervical esophagoscopy was performed.  This is introduced through the mouth and examination performed of the hypopharynx bilaterally.  Again the examination was negative for any mucosal lesions.  This was then introduced through the upper esophageal sphincter.  It was then advanced down to 30 cm.   Findings were negative for any additional lesions.  The scope was removed.  The mucosa was also visualized and the scope removed was well.)    He presented today for ongoing therapy.    He feels well and has no complaints of  fever, cough, bone pain, chest pain, shortness of breath, diarrhea,  nausea, abdominal pain or cramping headache, dizziness, focal paresthesias or focal weakness. He is able to get around without any difficulty.    ECOG PS-2    Current Outpatient Medications   Medication Sig Dispense Refill   • dorzolamide (TRUSOPT) 2 % ophthalmic solution INSTILL 1 DROPS INTO EACH EYE IN THE EVENING     • Euthyrox 88 MCG tablet Take 1 tablet by mouth once daily 30 tablet 0   • furosemide (LASIX) 40 MG tablet TAKE ONE TABLET BY MOUTH EVERY DAY     • POTASSIUM CHLORIDE ER PO Take 20 mEq by mouth daily.     • atorvastatin (LIPITOR) 10 MG tablet      • dorzolamide-timolol PF (COSOPT PF) 22.3-6.8 MG/ML ophthalmic solution      • omeprazole (PrilOSEC) 20 MG capsule TAKE ONE CAPSULE BY MOUTH EVERY MORNING FOR GASTRIC REFLUX     • lisinopril (ZESTRIL) 2.5 MG tablet Unsure of dose. Pt will chart and bring to next appt     • lisinopril (ZESTRIL) 20 MG tablet Take by mouth daily.     • predniSONE (DELTASONE) 5 MG tablet 5 mg daily.      • metoPROLOL succinate (TOPROL-XL) 25 MG 24 hr tablet Take 12.5 mg by mouth 2 times daily.     • aspirin 81 MG tablet Take 81 mg by mouth daily.     • acetaminophen (TYLENOL) 325 MG tablet Take 650 mg by mouth every 4 hours as needed.     • latanoprost (XALATAN) 0.005 % ophthalmic solution Place 1 drop into both eyes daily. Do not start before July 10, 2019.       PHYSICAL EXAMINATION:  VITALS: Blood pressure 123/54, pulse (!) 53, weight 84 kg (185 lb 3 oz), SpO2 99 %.  GENERAL:  Alert and oriented gentleman, in no acute distress.   HEENT:  Hearing aid is placed. Mild pallor. No icterus.     NECK:  No cervical or axillary adenopathy noted.   LUNGS:  Clear to auscultation.   CV:  Regular  rate and rhythm.   ABDOMEN:  Soft, nondistended, nontender without any palpable organomegaly.   SKIN:   post treatment changes. No palpable nodules. No concerning discoloration.  NEURO: non-focal    RADIOLOGY DATA:  IMPRESSION:  1.    Mild focal increased radionuclide accumulation at the level of the  left piriform sinus and aryepiglottic fold is nonspecific.    The  differential diagnosis includes malignancy.  Direct inspection advised.  2.   The left basilar lung mass has been resected.  Postoperative changes  are evident with pleural and parenchymal scarring.  There is no evidence of  local or regional recurrence.  No new lung metastases identified.  3.   There is enlargement and increased radionuclide accumulation  associated with the posterior peripheral zone of the prostate, SUV = 6.8.   This is suspicious for prostate cancer with possible extraprostatic  extension.  No pelvic lymphadenopathy identified.  4.   Paget's disease of the right hemipelvis limits evaluation for  osteoblastic bone metastases in this region.  I do not see any other areas  suspicious for bone metastasis.  5.   There is a small focus of increased radionuclide accumulation in the  soft tissues of the mid right lower leg overlying the tibia.  Direct  inspection advised.  A melanoma could have this appearance.  6.   Additional comments above.     Electronically Signed by: LAURO SARMIENTO MD   Signed on: 6/15/2022 1:23 PM        LAB DATA:  WBC   Date Value Ref Range Status   11/01/2022 8.4 4.2 - 11.0 K/mcL Final     RBC   Date Value Ref Range Status   11/01/2022 3.95 (L) 4.50 - 5.90 mil/mcL Final     HGB   Date Value Ref Range Status   11/01/2022 13.4 13.0 - 17.0 g/dL Final     HCT   Date Value Ref Range Status   11/01/2022 41.6 39.0 - 51.0 % Final     MCV   Date Value Ref Range Status   11/01/2022 105.3 (H) 78.0 - 100.0 fl Final     MCH   Date Value Ref Range Status   11/01/2022 33.9 26.0 - 34.0 pg Final     MCHC   Date Value Ref Range  Status   11/01/2022 32.2 32.0 - 36.5 g/dL Final     RDW-CV   Date Value Ref Range Status   11/01/2022 12.7 11.0 - 15.0 % Final     PLT   Date Value Ref Range Status   11/01/2022 211 140 - 450 K/mcL Final     NRBC   Date Value Ref Range Status   11/01/2022 0 <=0 /100 WBC Final     DIFF TYPE   Date Value Ref Range Status   10/21/2019 MANUAL DIFFERENTIAL  Final     Neutrophil, Percent   Date Value Ref Range Status   11/01/2022 64 % Final     Lymphocytes, Percent   Date Value Ref Range Status   11/01/2022 22 % Final     Mono, Percent   Date Value Ref Range Status   11/01/2022 9 % Final     Eosinophils, Percent   Date Value Ref Range Status   11/01/2022 3 % Final     Basophils, Percent   Date Value Ref Range Status   11/01/2022 1 % Final     Absolute Neutrophils   Date Value Ref Range Status   11/01/2022 5.4 1.8 - 7.7 K/mcL Final     Absolute Lymphocytes   Date Value Ref Range Status   11/01/2022 1.9 1.0 - 4.0 K/mcL Final     Absolute Monocytes   Date Value Ref Range Status   11/01/2022 0.8 0.3 - 0.9 K/mcL Final     Absolute Eosinophils    Date Value Ref Range Status   11/01/2022 0.3 0.0 - 0.5 K/mcL Final     Absolute Basophils   Date Value Ref Range Status   11/01/2022 0.1 0.0 - 0.3 K/mcL Final     RADIOLOGY DATA:  CT/PET:  IMPRESSION:     1.    Stable postsurgical changes in the left posterior chest wall with  associated scarring. No discrete soft tissue mass or abnormal uptake.  2.    No lymphadenopathy to suggest metastatic disease.  3.    Increasing asymmetric soft tissue thickening and abnormal uptake in  the left posterior hypopharynx adjacent to the aryepiglottic fold. Focal  lesion/neoplasm cannot be excluded. Correlation with direct visualization  is recommended.  4.    Stable postsurgical changes in the left lower lobe/lung base. No  focal mass or abnormal uptake.  5.    Increasing soft tissue mass in the posterior prostate base/seminal  vesicles with additional new focus of abnormal uptake in the left  prostate  which may represent progressive prostate malignancy or possibly metastatic  disease. Tissue sampling is recommended.  6.    Stable findings of Paget's disease in the right hemipelvis. No  additional abnormal osseous uptake.   7.   Additional findings as above.        Electronically Signed by: ERICH JUNG MD   Signed on: 10/14/2022 11:40 AM     IMPRESSION and PLAN:  1. Oligometastatic lung melanoma - Post resection after 4 cycles of immunotherapy with ELENA on pathology review; currently on Nivolumab.  Continue treatment  Labs reviewed with nursing  Follow up PET results as above  Repeat scans in 3-4 months  2.Prostate uptake- Urology evaluation recommended.  3.ENT follow up regarding uptake- negative direct laryngoscopy 9/27/22  4 History of Recurrent nodular non-melanotic melanoma at the same site 6 years later; recurred with subcutaneous nodules. Post XRT and immunotherapy x 2 years.  Follow up skin evaluation especially right leg- reports was negative    None of his other medical issues were addressed.   RTC upon return from [place]          ______________________________  Danette Mccabe MD     Copy:  Linda Gonzalez MD.                                     No

## 2023-01-24 PROBLEM — M67.00 SHORT ACHILLES TENDON (ACQUIRED), UNSPECIFIED ANKLE: Chronic | Status: ACTIVE | Noted: 2022-07-12

## 2023-01-24 PROBLEM — N18.9 CHRONIC KIDNEY DISEASE, UNSPECIFIED: Chronic | Status: ACTIVE | Noted: 2022-07-12

## 2023-01-24 PROBLEM — T84.223A: Chronic | Status: ACTIVE | Noted: 2022-07-12

## 2023-01-27 ENCOUNTER — APPOINTMENT (OUTPATIENT)
Dept: VASCULAR SURGERY | Facility: CLINIC | Age: 63
End: 2023-01-27
Payer: COMMERCIAL

## 2023-01-27 VITALS
HEART RATE: 81 BPM | HEIGHT: 73 IN | DIASTOLIC BLOOD PRESSURE: 75 MMHG | BODY MASS INDEX: 29.82 KG/M2 | WEIGHT: 225 LBS | SYSTOLIC BLOOD PRESSURE: 125 MMHG

## 2023-01-27 PROCEDURE — 99204 OFFICE O/P NEW MOD 45 MIN: CPT

## 2023-01-27 NOTE — ASSESSMENT
[FreeTextEntry1] : Patient with history of diabetes with extensive lower extremities/feet deformities status post reconstruction of the left foot.  Patient has a small ulceration on the plantar aspect of the first metatarsal head.  No evidence of infection in that area.\par \par Patient had cellulitis and fungal infection of the skin.  Continue antibiotics and antifungal cream.  Recommend dermatology evaluation.\par \par No evidence of significant arterial insufficiency based on clinical examination of palpable dorsalis pedis pulse with no evidence of rest pain or claudication symptoms along with arterial Dopplers done about a year and a half ago which showed no significant arterial insufficiency of lower extremities.\par \par Patient with bilateral lower extremity mild edema.  Suspect venous insufficiency along with resolving cellulitis.  We will follow-up with venous duplex in 4 to 6 weeks.\par \par Patient with history of smoking with mild carotid bruits.  Recommend carotid duplex in 4 to 6 weeks.\par \par This was all discussed with the patient in detail.

## 2023-01-27 NOTE — HISTORY OF PRESENT ILLNESS
[FreeTextEntry1] : Patient is a 62-year-old gentleman with past medical history significant for diabetes, former heavy smoker with extensive diabetic foot issues in the past.  Patient has had 2 extensive left foot surgeries done secondary to Charcot foot.  Second surgery was done recently August 2022 with extensive reconstruction of the foot.\par \par Right foot toe amputation has healed well.  This was done in March 2020.\par \par Patient comes to us for evaluation of bilateral lower extremity cellulitis and redness and mild swelling.\par \par Symptoms have improved significantly with start of antibiotics.  Patient currently is in the middle of a 10-day course of antibiotics along with application of antifungal creams.\par \par No fevers or chills.  No rest pain or claudication symptoms.\par \par No history of DVT.\par \par No history of CVA or TIA.\par \par

## 2023-01-27 NOTE — PHYSICAL EXAM
[Normal Breath Sounds] : Normal breath sounds [Normal Heart Sounds] : normal heart sounds [2+] : left 2+ [Ankle Swelling (On Exam)] : present [Ankle Swelling Bilaterally] : bilaterally  [Ankle Swelling On The Right] : mild [Skin Ulcer] : ulcer

## 2023-01-27 NOTE — CONSULT LETTER
[Dear  ___] : Dear  [unfilled], [Consult Letter:] : I had the pleasure of evaluating your patient, [unfilled]. [Please see my note below.] : Please see my note below. [Consult Closing:] : Thank you very much for allowing me to participate in the care of this patient.  If you have any questions, please do not hesitate to contact me. [Sincerely,] : Sincerely, [FreeTextEntry3] : Shaun Newman M.D., F.JULESS., R.P.RYLEE.I.\par  of Vascular Surgery\par Assistant Professor of Radiology\par Director of Endovascular Program/ Vascular Access Center\par [facility]

## 2023-03-03 NOTE — H&P PST ADULT - NSSUBSTANCEUSE_GEN_ALL_CORE_SD
caffeine Eucrisa Pregnancy And Lactation Text: This medication has not been assigned a Pregnancy Risk Category but animal studies failed to show danger with the topical medication. It is unknown if the medication is excreted in breast milk.

## 2023-03-31 ENCOUNTER — APPOINTMENT (OUTPATIENT)
Dept: VASCULAR SURGERY | Facility: CLINIC | Age: 63
End: 2023-03-31
Payer: COMMERCIAL

## 2023-03-31 PROCEDURE — 99214 OFFICE O/P EST MOD 30 MIN: CPT

## 2023-03-31 PROCEDURE — 93880 EXTRACRANIAL BILAT STUDY: CPT

## 2023-03-31 PROCEDURE — 93970 EXTREMITY STUDY: CPT

## 2023-03-31 NOTE — ASSESSMENT
[FreeTextEntry1] : Patient with history of diabetes with extensive lower extremities/feet deformities status post reconstruction of the left foot.  Patient has a small ulceration on the plantar aspect of the first metatarsal head.  No evidence of infection in that area.\par \par Patient had cellulitis and fungal infection of the skin.  Continue antibiotics and antifungal cream.  Recommend dermatology evaluation.\par \par No evidence of significant arterial insufficiency based on clinical examination of palpable dorsalis pedis pulse with no evidence of rest pain or claudication symptoms along with arterial Dopplers done about a year and a half ago which showed no significant arterial insufficiency of lower extremities.\par \par Patient with bilateral lower extremity mild edema.  resolving cellulitis.  No significant DVT.  Mild venous insufficiency.  No significant carotid disease.\par \par Follow-up.

## 2023-03-31 NOTE — PHYSICAL EXAM
[Normal Breath Sounds] : Normal breath sounds [Normal Heart Sounds] : normal heart sounds [2+] : left 2+ [Ankle Swelling (On Exam)] : present [Ankle Swelling Bilaterally] : bilaterally  [Skin Ulcer] : ulcer [Ankle Swelling On The Right] : mild

## 2023-06-10 ENCOUNTER — EMERGENCY (EMERGENCY)
Facility: HOSPITAL | Age: 63
LOS: 1 days | Discharge: HOME | End: 2023-06-10
Attending: EMERGENCY MEDICINE
Payer: COMMERCIAL

## 2023-06-10 VITALS
OXYGEN SATURATION: 97 % | SYSTOLIC BLOOD PRESSURE: 145 MMHG | RESPIRATION RATE: 18 BRPM | DIASTOLIC BLOOD PRESSURE: 80 MMHG | HEART RATE: 67 BPM

## 2023-06-10 VITALS
HEART RATE: 81 BPM | SYSTOLIC BLOOD PRESSURE: 141 MMHG | RESPIRATION RATE: 18 BRPM | OXYGEN SATURATION: 98 % | HEIGHT: 73 IN | TEMPERATURE: 98 F | WEIGHT: 225.09 LBS | DIASTOLIC BLOOD PRESSURE: 78 MMHG

## 2023-06-10 DIAGNOSIS — Z98.890 OTHER SPECIFIED POSTPROCEDURAL STATES: Chronic | ICD-10-CM

## 2023-06-10 DIAGNOSIS — Z89.429 ACQUIRED ABSENCE OF OTHER TOE(S), UNSPECIFIED SIDE: Chronic | ICD-10-CM

## 2023-06-10 LAB
ALBUMIN SERPL ELPH-MCNC: 3.8 G/DL — SIGNIFICANT CHANGE UP (ref 3.5–5)
ALP SERPL-CCNC: 94 U/L — SIGNIFICANT CHANGE UP (ref 40–120)
ALT FLD-CCNC: 23 U/L DA — SIGNIFICANT CHANGE UP (ref 10–60)
ANION GAP SERPL CALC-SCNC: 11 MMOL/L — SIGNIFICANT CHANGE UP (ref 5–17)
APPEARANCE UR: CLEAR — SIGNIFICANT CHANGE UP
AST SERPL-CCNC: 17 U/L — SIGNIFICANT CHANGE UP (ref 10–40)
BASOPHILS # BLD AUTO: 0.02 K/UL — SIGNIFICANT CHANGE UP (ref 0–0.2)
BASOPHILS NFR BLD AUTO: 0.1 % — SIGNIFICANT CHANGE UP (ref 0–2)
BILIRUB SERPL-MCNC: 0.7 MG/DL — SIGNIFICANT CHANGE UP (ref 0.2–1.2)
BILIRUB UR-MCNC: NEGATIVE — SIGNIFICANT CHANGE UP
BUN SERPL-MCNC: 46 MG/DL — HIGH (ref 7–18)
CALCIUM SERPL-MCNC: 10.9 MG/DL — HIGH (ref 8.4–10.5)
CHLORIDE SERPL-SCNC: 103 MMOL/L — SIGNIFICANT CHANGE UP (ref 96–108)
CO2 SERPL-SCNC: 22 MMOL/L — SIGNIFICANT CHANGE UP (ref 22–31)
COLOR SPEC: YELLOW — SIGNIFICANT CHANGE UP
CREAT SERPL-MCNC: 2.56 MG/DL — HIGH (ref 0.5–1.3)
DIFF PNL FLD: ABNORMAL
EGFR: 28 ML/MIN/1.73M2 — LOW
EOSINOPHIL # BLD AUTO: 0.03 K/UL — SIGNIFICANT CHANGE UP (ref 0–0.5)
EOSINOPHIL NFR BLD AUTO: 0.2 % — SIGNIFICANT CHANGE UP (ref 0–6)
GLUCOSE SERPL-MCNC: 159 MG/DL — HIGH (ref 70–99)
GLUCOSE UR QL: NEGATIVE — SIGNIFICANT CHANGE UP
HCT VFR BLD CALC: 43.2 % — SIGNIFICANT CHANGE UP (ref 39–50)
HGB BLD-MCNC: 13.9 G/DL — SIGNIFICANT CHANGE UP (ref 13–17)
IMM GRANULOCYTES NFR BLD AUTO: 0.6 % — SIGNIFICANT CHANGE UP (ref 0–0.9)
KETONES UR-MCNC: ABNORMAL
LEUKOCYTE ESTERASE UR-ACNC: NEGATIVE — SIGNIFICANT CHANGE UP
LYMPHOCYTES # BLD AUTO: 1.86 K/UL — SIGNIFICANT CHANGE UP (ref 1–3.3)
LYMPHOCYTES # BLD AUTO: 12.1 % — LOW (ref 13–44)
MCHC RBC-ENTMCNC: 29 PG — SIGNIFICANT CHANGE UP (ref 27–34)
MCHC RBC-ENTMCNC: 32.2 GM/DL — SIGNIFICANT CHANGE UP (ref 32–36)
MCV RBC AUTO: 90 FL — SIGNIFICANT CHANGE UP (ref 80–100)
MONOCYTES # BLD AUTO: 1.44 K/UL — HIGH (ref 0–0.9)
MONOCYTES NFR BLD AUTO: 9.4 % — SIGNIFICANT CHANGE UP (ref 2–14)
NEUTROPHILS # BLD AUTO: 11.96 K/UL — HIGH (ref 1.8–7.4)
NEUTROPHILS NFR BLD AUTO: 77.6 % — HIGH (ref 43–77)
NITRITE UR-MCNC: NEGATIVE — SIGNIFICANT CHANGE UP
NRBC # BLD: 0 /100 WBCS — SIGNIFICANT CHANGE UP (ref 0–0)
PH UR: 5 — SIGNIFICANT CHANGE UP (ref 5–8)
PLATELET # BLD AUTO: 218 K/UL — SIGNIFICANT CHANGE UP (ref 150–400)
POTASSIUM SERPL-MCNC: 4.1 MMOL/L — SIGNIFICANT CHANGE UP (ref 3.5–5.3)
POTASSIUM SERPL-SCNC: 4.1 MMOL/L — SIGNIFICANT CHANGE UP (ref 3.5–5.3)
PROT SERPL-MCNC: 8.6 G/DL — HIGH (ref 6–8.3)
PROT UR-MCNC: 30 MG/DL
RBC # BLD: 4.8 M/UL — SIGNIFICANT CHANGE UP (ref 4.2–5.8)
RBC # FLD: 13.4 % — SIGNIFICANT CHANGE UP (ref 10.3–14.5)
SODIUM SERPL-SCNC: 136 MMOL/L — SIGNIFICANT CHANGE UP (ref 135–145)
SP GR SPEC: 1.01 — SIGNIFICANT CHANGE UP (ref 1.01–1.02)
UROBILINOGEN FLD QL: NEGATIVE — SIGNIFICANT CHANGE UP
WBC # BLD: 15.4 K/UL — HIGH (ref 3.8–10.5)
WBC # FLD AUTO: 15.4 K/UL — HIGH (ref 3.8–10.5)

## 2023-06-10 PROCEDURE — 99285 EMERGENCY DEPT VISIT HI MDM: CPT

## 2023-06-10 PROCEDURE — 80053 COMPREHEN METABOLIC PANEL: CPT

## 2023-06-10 PROCEDURE — 74176 CT ABD & PELVIS W/O CONTRAST: CPT | Mod: MG

## 2023-06-10 PROCEDURE — 99284 EMERGENCY DEPT VISIT MOD MDM: CPT | Mod: 25

## 2023-06-10 PROCEDURE — 96361 HYDRATE IV INFUSION ADD-ON: CPT

## 2023-06-10 PROCEDURE — 85025 COMPLETE CBC W/AUTO DIFF WBC: CPT

## 2023-06-10 PROCEDURE — 87086 URINE CULTURE/COLONY COUNT: CPT

## 2023-06-10 PROCEDURE — G1004: CPT

## 2023-06-10 PROCEDURE — 96374 THER/PROPH/DIAG INJ IV PUSH: CPT

## 2023-06-10 PROCEDURE — 36415 COLL VENOUS BLD VENIPUNCTURE: CPT

## 2023-06-10 PROCEDURE — 74176 CT ABD & PELVIS W/O CONTRAST: CPT | Mod: 26,MG

## 2023-06-10 PROCEDURE — 81001 URINALYSIS AUTO W/SCOPE: CPT

## 2023-06-10 PROCEDURE — 96375 TX/PRO/DX INJ NEW DRUG ADDON: CPT

## 2023-06-10 RX ORDER — MORPHINE SULFATE 50 MG/1
4 CAPSULE, EXTENDED RELEASE ORAL ONCE
Refills: 0 | Status: DISCONTINUED | OUTPATIENT
Start: 2023-06-10 | End: 2023-06-10

## 2023-06-10 RX ORDER — KETOROLAC TROMETHAMINE 30 MG/ML
30 SYRINGE (ML) INJECTION ONCE
Refills: 0 | Status: DISCONTINUED | OUTPATIENT
Start: 2023-06-10 | End: 2023-06-10

## 2023-06-10 RX ORDER — TAMSULOSIN HYDROCHLORIDE 0.4 MG/1
1 CAPSULE ORAL
Qty: 14 | Refills: 0
Start: 2023-06-10 | End: 2023-06-23

## 2023-06-10 RX ORDER — OXYCODONE AND ACETAMINOPHEN 5; 325 MG/1; MG/1
1 TABLET ORAL
Qty: 10 | Refills: 0
Start: 2023-06-10

## 2023-06-10 RX ORDER — SODIUM CHLORIDE 9 MG/ML
1000 INJECTION INTRAMUSCULAR; INTRAVENOUS; SUBCUTANEOUS ONCE
Refills: 0 | Status: COMPLETED | OUTPATIENT
Start: 2023-06-10 | End: 2023-06-10

## 2023-06-10 RX ADMIN — MORPHINE SULFATE 4 MILLIGRAM(S): 50 CAPSULE, EXTENDED RELEASE ORAL at 11:41

## 2023-06-10 RX ADMIN — Medication 30 MILLIGRAM(S): at 10:44

## 2023-06-10 RX ADMIN — SODIUM CHLORIDE 1000 MILLILITER(S): 9 INJECTION INTRAMUSCULAR; INTRAVENOUS; SUBCUTANEOUS at 11:14

## 2023-06-10 RX ADMIN — SODIUM CHLORIDE 1000 MILLILITER(S): 9 INJECTION INTRAMUSCULAR; INTRAVENOUS; SUBCUTANEOUS at 10:14

## 2023-06-10 RX ADMIN — Medication 30 MILLIGRAM(S): at 10:14

## 2023-06-10 RX ADMIN — MORPHINE SULFATE 4 MILLIGRAM(S): 50 CAPSULE, EXTENDED RELEASE ORAL at 12:11

## 2023-06-10 NOTE — ED PROVIDER NOTE - OBJECTIVE STATEMENT
62-year-old male history of diabetes, hyperlipidemia, CKD, GERD presents with left flank pain since Thursday.  As per patient he thought it was just muscular pain but he noticed it increasing in severity Friday into Saturday.  Patient reporting dark urine however denying hematuria, no vomiting, no nausea, no fever, no diarrhea, no urinary complaints.  Patient denies similar symptoms previously.  The pain is now radiating from the flank to the testicle area.  The pain was worse so the patient came in for evaluation.

## 2023-06-10 NOTE — ED PROVIDER NOTE - PROGRESS NOTE DETAILS
CT showing 5mm stone.  UA  negative for infection.  Will dc with analgesia, flomax, and urology follow up.

## 2023-06-10 NOTE — ED PROVIDER NOTE - PATIENT PORTAL LINK FT
You can access the FollowMyHealth Patient Portal offered by [facility] by registering at the following website: [email or web address]. By joining [facility]’s FollowMyHealth portal, you will also be able to view your health information using other applications (apps) compatible with our system.

## 2023-06-10 NOTE — ED PROVIDER NOTE - CLINICAL SUMMARY MEDICAL DECISION MAKING FREE TEXT BOX
62-year-old male history of diabetes, hyperlipidemia, CKD, GERD presents with left flank pain and dark urine for the last 2 to 3 days.  No nausea no vomiting no fever.  Concern for kidney stone versus pyelonephritis.  Will check labs, urinalysis, CAT scan, pain control and reassess

## 2023-06-10 NOTE — ED PROVIDER NOTE - NSFOLLOWUPINSTRUCTIONS_ED_ALL_ED_FT
Kidney Stones  A body outline of the urinary tract with a close-up of a kidney showing kidney stones.  Kidney stones are solid, rock-like deposits that form inside of the kidneys. The kidneys are a pair of organs that make urine. A kidney stone may form in a kidney and move into other parts of the urinary tract, including the tubes that connect the kidneys to the bladder (ureters), the bladder, and the tube that carries urine out of the body (urethra). As the stone moves through these areas, it can cause intense pain and block the flow of urine.    Kidney stones are created when high levels of certain minerals are found in the urine. The stones are usually passed out of the body through urination, but in some cases, medical treatment may be needed to remove them.    What are the causes?  Kidney stones may be caused by:  A condition in which certain glands produce too much parathyroid hormone (primary hyperparathyroidism), which causes too much calcium buildup in the blood.  A buildup of uric acid crystals in the bladder (hyperuricosuria). Uric acid is a chemical that the body produces when you eat certain foods. It usually leaves the body in the urine.  Narrowing (stricture) of one or both of the ureters.  A kidney blockage that is present at birth (congenital obstruction).  Past surgery on the kidney or the ureters.  What increases the risk?  The following factors may make you more likely to develop this condition:  Having had a kidney stone in the past.  Having a family history of kidney stones.  Not drinking enough water.  Eating a diet that is high in protein, salt (sodium), or sugar.  Being overweight or obese.  What are the signs or symptoms?  Symptoms of a kidney stone may include:  Pain in the side of the abdomen, right below the ribs (flank pain). Pain usually spreads (radiates) to the groin.  Needing to urinate often or urgently.  Painful urination.  Blood in the urine (hematuria).  Nausea.  Vomiting.  Fever and chills.  How is this diagnosed?  This condition may be diagnosed based on:  Your symptoms and medical history.  A physical exam.  Blood tests.  Urine tests. These may be done before and after the stone passes out of your body through urination.  Imaging tests, such as a CT scan, abdominal X-ray, or ultrasound.  A procedure to examine the inside of the bladder (cystoscopy).  How is this treated?  Treatment for kidney stones depends on the size, location, and makeup of the stones. Kidney stones will often pass out of the body through urination. You may need to:  Increase your fluid intake to help pass the stone. In some cases, you may be given fluids through an IV and may need to be monitored in the hospital.  Take medicine for pain.  Make changes in your diet to help prevent kidney stones from coming back.  Sometimes, procedures are needed to remove a kidney stone. This may involve:  A procedure to break up kidney stones using:  A focused beam of light (laser therapy).  Shock waves (extracorporeal shock wave lithotripsy).  Surgery to remove kidney stones. This may be needed if you have severe pain or have stones that block your urinary tract.  Follow these instructions at home:  Medicines    Take over-the-counter and prescription medicines only as told by your health care provider.  Ask your health care provider if the medicine prescribed to you requires you to avoid driving or using heavy machinery.  Eating and drinking    Drink enough fluid to keep your urine pale yellow. You may be instructed to drink at least 8–10 glasses of water each day. This will help you pass the kidney stone.  If directed, change your diet. This may include:  Limiting how much sodium you eat.  Eating more fruits and vegetables.  Limiting how much animal protein you eat. Animal proteins include red meat, poultry, fish, and eggs.  Eating a normal amount of calcium (1,000–1,300 mg per day).  Follow instructions from your health care provider about eating or drinking restrictions.  General instructions    Collect urine samples as told by your health care provider. You may need to collect a urine sample:  24 hours after you pass the stone.  8–12 weeks after you pass the kidney stone, and every 6–12 months after that.  Strain your urine every time you urinate, for as long as directed. Use the strainer that your health care provider recommends.  Do not throw out the kidney stone after passing it. Keep the stone so it can be tested by your health care provider. Testing the makeup of your kidney stone may help prevent you from getting kidney stones in the future.  Keep all follow-up visits. You may need follow-up X-rays or ultrasounds to make sure that your stone has passed.  How is this prevented?  A comparison of three sample cups showing dark yellow, yellow, and pale yellow urine.  To prevent another kidney stone:  Drink enough fluid to keep your urine pale yellow. This is the best way to prevent kidney stones.  Eat a healthy diet. Follow recommendations from your health care provider about foods to avoid. Recommendations vary depending on the type of kidney stone that you have. You may be instructed to eat a low-protein diet.  Maintain a healthy weight.  Where to find more information  National Kidney Foundation (NKF): www.kidney.org  [facility] [facility] (F): www.urologyhealth.org  Contact a health care provider if:  You have pain that gets worse or does not get better with medicine.  Get help right away if:  You have a fever or chills.  You develop severe pain.  You develop new abdominal pain.  You faint.  You are unable to urinate.  Summary  Kidney stones are solid, rock-like deposits that form inside of the kidneys.  Kidney stones can cause nausea, vomiting, blood in the urine, abdominal pain, and the urge to urinate often.  Treatment for kidney stones depends on the size, location, and makeup of the stones. Kidney stones will often pass out of the body through urination.  Kidney stones can be prevented by drinking enough fluids, eating a healthy diet, and maintaining a healthy weight.  This information is not intended to replace advice given to you by your health care provider. Make sure you discuss any questions you have with your health care provider.    Document Revised: 03/29/2023 Document Reviewed: 03/29/2023

## 2023-06-10 NOTE — ED ADULT NURSE NOTE - OBJECTIVE STATEMENT
Pt states woke up with Lt flank pain radiating to left testicle since this AM , dark urine   Denies fever , chills, nausea or vomiting

## 2023-06-10 NOTE — ED ADULT NURSE NOTE - NSFALLUNIVINTERV_ED_ALL_ED
Conjuntivae and eyelids appear normal,  Sclerae : White without injection
Bed/Stretcher in lowest position, wheels locked, appropriate side rails in place/Call bell, personal items and telephone in reach/Instruct patient to call for assistance before getting out of bed/chair/stretcher/Non-slip footwear applied when patient is off stretcher/[place] to call system/Physically safe environment - no spills, clutter or unnecessary equipment/Purposeful proactive rounding/Room/bathroom lighting operational, light cord in reach

## 2023-06-10 NOTE — ED PROVIDER NOTE - PHYSICAL EXAMINATION
Physical Therapy Aquatic Flow Sheet   Date:  2019    Patient Name:  Cecelia Pickard    :  1976     Restrictions/Precautions: Restrictions/Precautions  Restrictions/Precautions: Fall Risk(none)    Pertinent Medical History:       Medical/Treatment Diagnosis Information:  · Diagnosis: fibromyalgia  · Treatment Diagnosis: increased pain     Insurance/Certification information:  PT Insurance Information: Ej  Physician Information:  Referring Practitioner: Dr. Bermudez [place] of care signed (Y/N):       Visit# / total visits:  10/12   Pain level:       4/10      Progress Note: []  Yes  [x]  No  Next due by: Visit #10:      History of Injury:Patient has had diagnosis of fibromyalgia for several years she had water therapy at the same time last year but did not continue secondary to personal issues. Deyanira Ramirez is helping maintain her symptoms with holistis techniques that have helped calm down the legs but the core is worse since abdominal herniasurgery ilast year. Deyanira Ramirez gets a massage 1 x a week    Subjective: doing better today. Ribs better today.      Objective:   Observation:  See eval   Test measurements:      Key  B= Belt DB= Dumbells T= Theratube   G=Gloves N= Noodles W= Weights   P= Paddles WW=Water Walker K= Kickboard        Transfers:   steps ind      % Immersion: 75%            Ambulation:  laps ind Exercises UE:  B    Forwards 5 Shoulder Shrugs 10    Lateral 1 Shoulder circles 10    Marching 1 Scapular Retraction 10    Retro 1 Rolling 10    [place]as  Push Downs 10     IR/ER 10     Punching    Stretching: B  30 sec  Rowing 10   Gastroc/Soleus  2 Elbow Flex/Ext 10   Hamstring  2 Shldr Flex/Ext 10   Knee flex stretch   Shldr Abd/Add 10   Piriformis  1 Horiz Abd/Add 10   Hip flexor    Arm Circles 10   SKTC   PNF Diagonals 10   DKTC 1 min UE oscillations f/b, s/s    ITB   Wall Push-ups    Quad  Figure 8's    Mid back  1 Buoy pull/push downs    UT  Tband rows    Rhom 1 Tband lats    Post Shoulder left flank pain

## 2023-06-10 NOTE — ED ADULT NURSE NOTE - ED STAT RN HANDOFF DETAILS
Patient verbalized understanding D/C instructions to: Take medications as prescribed, follow up with Urology & return for sudden or worsening symptoms. IV removed no redness or swelling noted. Catheter intact, pressure dressing applied.

## 2023-06-11 LAB
CULTURE RESULTS: SIGNIFICANT CHANGE UP
SPECIMEN SOURCE: SIGNIFICANT CHANGE UP

## 2023-06-27 ENCOUNTER — APPOINTMENT (OUTPATIENT)
Dept: UROLOGY | Facility: CLINIC | Age: 63
End: 2023-06-27
Payer: COMMERCIAL

## 2023-06-27 DIAGNOSIS — Z86.39 PERSONAL HISTORY OF OTHER ENDOCRINE, NUTRITIONAL AND METABOLIC DISEASE: ICD-10-CM

## 2023-06-27 PROCEDURE — 99203 OFFICE O/P NEW LOW 30 MIN: CPT

## 2023-06-27 RX ORDER — TAMSULOSIN HYDROCHLORIDE 0.4 MG/1
0.4 CAPSULE ORAL
Qty: 30 | Refills: 0 | Status: ACTIVE | COMMUNITY
Start: 2023-06-27 | End: 1900-01-01

## 2023-06-27 NOTE — ASSESSMENT
[FreeTextEntry1] : Mr. Simpson presents for initial evaluation of a 5mm L UVJ stone. Currently on a trial of medical expulsive therapy. [facility]. Pain controlled with Percocet and OTC pain medications. No previous history of nephrolithiasis. \par Imaging and labs reviewed by me\par \par Of note, Cr: 2.56, Ucx: no growth \par \par Reviewed based on size and location of stone, he as a 50% chance of passing the stone independently.\par \par Recommendations\par -aggressive fluid hydration\par -continue tamsulosin\par -strain urine \par -acetaminophen, Percocet for pain control; avoid NSAIDs\par -BMP\par -a1c\par -Ucx\par -CT stone hunt in 2 weeks - surgical management based on those results \par

## 2023-06-27 NOTE — HISTORY OF PRESENT ILLNESS
[FreeTextEntry1] : 62M presents for initial evaluation of kidney stone \par Referred by [facility] \par \par PMH significant for: DM2, HLD, CKD, GERD \par PSH significant for: left foot 4 screws, right pinky toe amputation \par Significant meds: nothing\par \par Presented to [facility] 6/10 for left flank/testicle pain x2days \par CT 6/10: mild left hydroureteronephrosis along with left perinephric stranding and trace amount of perinephric fluid secondary to 5mm stone in the distal left ureter  \par Cr 6/10: 2.56, UCx 6/11: WNL \par \par Flank pain: yes, left dull ache\par Other symptoms: no\par Controlled with medications: yes\par Previous history of stones: no\par Previous history of stone procedure: no\par Taking tamsulosin: yes\par Straining urine: no\par

## 2023-06-27 NOTE — PHYSICAL EXAM
[General Appearance - Well Developed] : well developed [General Appearance - Well Nourished] : well nourished [Costovertebral Angle Tenderness] : no ~M costovertebral angle tenderness [FreeTextEntry1] : boot on left leg. ambulates independently

## 2023-06-29 ENCOUNTER — NON-APPOINTMENT (OUTPATIENT)
Age: 63
End: 2023-06-29

## 2023-06-29 LAB
ANION GAP SERPL CALC-SCNC: 13 MMOL/L
BACTERIA UR CULT: NORMAL
BUN SERPL-MCNC: 49 MG/DL
CALCIUM SERPL-MCNC: 10.2 MG/DL
CHLORIDE SERPL-SCNC: 102 MMOL/L
CO2 SERPL-SCNC: 25 MMOL/L
CREAT SERPL-MCNC: 1.96 MG/DL
EGFR: 38 ML/MIN/1.73M2
ESTIMATED AVERAGE GLUCOSE: 146 MG/DL
GLUCOSE SERPL-MCNC: 96 MG/DL
HBA1C MFR BLD HPLC: 6.7 %
POTASSIUM SERPL-SCNC: 4.8 MMOL/L
SODIUM SERPL-SCNC: 140 MMOL/L

## 2023-07-18 ENCOUNTER — APPOINTMENT (OUTPATIENT)
Dept: UROLOGY | Facility: CLINIC | Age: 63
End: 2023-07-18

## 2023-07-22 NOTE — ASU PREOP CHECKLIST - DENTURES
[address] [facility] Adult  Hospitalist Group                                                                                          Hospitalist Progress Note  Violet Tapia  Answering service: [phone] from in house phone        Date of Service:  2023  NAME:  Allan Dunbar  :  1943  MRN:  [id number]      Admission Summary:   Allan Dunbar is a 78 y.o. female who presented to [address] [place] [address],[address] for a planned lumbar surgery. Pt underwent a revision laminectomy L2-S1 and posterioir lumbar fusion L2-4 on 2023. Hospitalists were consulted for postoperative medical management. Patient was seen and examined, she was lying in bed in no acute distress though reports back pain that has not been relieved with oxycodone. Discussed pain modality available, has IV Dilaudid that may be offered-discussed with nursing who ultimately deferred giving IV pain medicine due to patient's relative hypotension. Hypotension was accompanied by dizziness however patient reports that she has a history of dizziness and is not much different now from what she came in. Plan to hold patient's blood pressure medication (metoprolol) in light of hypotension. Will suggest that orthostatic measurements be taken when patient getting up with physical therapy. she otherwise denies any headaches, chest pain, chest pressure, shortness of breath. Denies any GI complaints such as nausea, vomiting, diarrhea or constipation. Kraft catheter was removed today and she has not yet felt the urge to void. Reviewed prior to admission med list, patient reports that she is missing a nasal spray similar to Flonase but this is not on her PTA med list.    Interval history / Subjective:   No acute interval events, patient feeling better today than yesterday. WBC improved, infectious workup negtaive.  okay to discharge from hospitalist perspective     Assessment & Plan:     Back Pain due to Lumbar stenosis with neurogenic no

## 2023-08-14 ENCOUNTER — LABORATORY RESULT (OUTPATIENT)
Age: 63
End: 2023-08-14

## 2023-08-14 ENCOUNTER — APPOINTMENT (OUTPATIENT)
Dept: UROLOGY | Facility: CLINIC | Age: 63
End: 2023-08-14

## 2023-08-14 ENCOUNTER — APPOINTMENT (OUTPATIENT)
Dept: UROLOGY | Facility: CLINIC | Age: 63
End: 2023-08-14
Payer: COMMERCIAL

## 2023-08-14 VITALS
BODY MASS INDEX: 29.82 KG/M2 | WEIGHT: 225 LBS | TEMPERATURE: 97.8 F | HEIGHT: 73 IN | DIASTOLIC BLOOD PRESSURE: 71 MMHG | SYSTOLIC BLOOD PRESSURE: 120 MMHG | HEART RATE: 75 BPM | OXYGEN SATURATION: 98 %

## 2023-08-14 DIAGNOSIS — N20.0 CALCULUS OF KIDNEY: ICD-10-CM

## 2023-08-14 PROCEDURE — 99213 OFFICE O/P EST LOW 20 MIN: CPT

## 2023-08-14 PROCEDURE — 76775 US EXAM ABDO BACK WALL LIM: CPT

## 2023-08-14 NOTE — ASSESSMENT
[FreeTextEntry1] : Mr. Simpson presents for follow up of nephrolithiasis. He recently passed a 5 mm L UVJ stone. No previous stone history Renal US today shows no hydro or residual stones  Specimen collected  Discussed stone prevention strategies: increase fluid intake to ensure clear or pale yellow urine; limit salt and animal protein intake. Patient aware of 50% risk or repeat stone episode in 5 years.  Recommendations -stone for biochem -prevention as above -RTC PRN

## 2023-08-14 NOTE — HISTORY OF PRESENT ILLNESS
[FreeTextEntry1] : 62M presents for follow up evaluation   h/o 5mm stone in the left distal ureter  Passed stone on 6/30  Presents for 1month f/u renal US

## 2023-08-15 NOTE — PACU DISCHARGE NOTE - NSPTMEETSDISCHCRITERIADT_GEN_A_CORE
15-Usama-2020 09:47 Otezla Counseling: The side effects of Otezla were discussed with the patient, including but not limited to worsening or new depression, weight loss, diarrhea, nausea, upper respiratory tract infection, and headache. Patient instructed to call the office should any adverse effect occur.  The patient verbalized understanding of the proper use and possible adverse effects of Otezla.  All the patient's questions and concerns were addressed.

## 2024-04-08 NOTE — ED ADULT TRIAGE NOTE - MODE OF ARRIVAL
Detail Level: Generalized Detail Level: Simple Public[employer] Walk in Patient Specific Counseling (Will Not Stick From Patient To Patient): Patient states had large area of hairloss  left lateral scalp responsive to ILK performed 3 to 4 monthly treatments at [facility].  Today one small area noted so ILK performed today with fu here or at [facility] Detail Level: Detailed

## 2024-05-09 NOTE — DIETITIAN INITIAL EVALUATION ADULT. - PERTINENT LABORATORY DATA
Deyvi was seen and treated in our emergency department on 5/9/2024.  He may return to school on 05/13/2024.      If you have any questions or concerns, please don't hesitate to call.      Jovon Cruz MD 03-17 Na132 mmol/L<L> Glu 270 mg/dL<H> K+ 3.9 mmol/L Cr  1.04 mg/dL BUN 14 mg/dL 03-17 Phos 2.0 mg/dL<L> 03-17 Alb 1.9 g/dL<L> 03-17 [id number] 14.4 %<H> 03-17 Chol 126 mg/dL LDL 71 mg/dL HDL 19 mg/dL<L> Trig 180 mg/dL<H>

## 2024-07-19 NOTE — PACU DISCHARGE NOTE - NS MD DISCHARGE NOTE DISCHARGE
Home Resume taking aldactone 25 mg daily  Daily weight and log  Fu with PCP  Talk with PCP about outpt sleep study

## 2025-05-13 NOTE — ASU PATIENT PROFILE, ADULT - NS SC CAGE ALCOHOL CUT DOWN
[de-identified] : The patient is a well appearing 44-year-old male of their stated age. Patient ambulates with normal gait Negative straight leg raise bilateral.   Surgical site: Right knee    Incision sites: Well Healed   Range of motion: 0-130 degrees    Motor Testin+/5 Quadriceps   Stability Testing: Stable ligamentous exam    Swelling/Effusion/Ecchymosis: None   Tenderness to palpation: None   Provocative testing: -LM/AD   Right Calf: soft and nontender Left Calf: soft and nontender   Neurovascular Examination: Grossly intact, 2+ distal pulses Contralateral Extremity: Examination grossly benign   Assessment & Plan: The patient is approximately 10 weeks s/p right knee EUA arthroscopic anterior cruciate ligament reconstruction with quadriceps autograft with internal brace, anterolateral ligament repair, partial lateral meniscectomy, synovectomy and excision of plica (DOS: 3/4/25).  The patient's post-op plan, protocol and activity modifications have been thoroughly discussed and the patient expressed understanding. The patient presents with better range of motion with compliant use of Flexion Peggy Splint. Continue physical therapy working on strengthening. The patient will control pain as discussed & continue ice and elevation as needed. The patient otherwise may advance activity as discussed. Recommended mobilizing patella and instructed patient how to do patella mobilization. In 2 weeks,  the patient will start to advance with straight line activity on flat, stable ground. The patient will avoid any dynamic pivoting or cutting and continue to work on strengthening. Follow up 6 weeks.   The patient's current medication management of their orthopedic diagnosis was reviewed today: The patient declined and/or was contraindicated for the recommended prescription medication Naprosyn and will use over the counter Advil, Aleve, Voltaren Gel or Tylenol as directed. (1) We discussed a comprehensive treatment plan that included possible pharmaceutical management involving the use of prescription strength medications including but not limited to options such as oral Naprosyn 500mg BID, once daily Meloxicam 15 mg, or 500-650 mg Tylenol versus over-the-counter oral medications and topical prescription NSAID Pennsaid vs over the counter Voltaren gel.  Based on our extensive discussion, the patient declined prescription medication and will use over the counter Advil, Aleve, Voltaren Gel or Tylenol as directed. (2) There is a moderate risk of morbidity with further treatment, especially from use of prescription strength medications and possible side effects of these medications which include upset stomach with oral medications, skin reactions to topical medications and cardiac/renal issues with long term use. (3) I recommended that the patient follow-up with their medical physician to discuss any significant specific potential issues with long term medication use such as interactions with current medications or with exacerbation of underlying medical comorbidities. (4) The benefits and risks associated with use of injectable, oral or topical, prescription and over the counter anti-inflammatory medications were discussed with the patient. The patient voiced understanding of the risks including but not limited to bleeding, stroke, kidney dysfunction, heart disease, and were referred to the black box warning label for further information.   Lynne CAMARGO attest that this documentation has been prepared under the direction and in the presence of Bel Perez PA-C.  The documentation recorded by the scribe accurately reflects the service Bel CAMARGO PA-C, personally performed and the decisions made by me.
no

## 2025-05-25 NOTE — H&P PST ADULT - SKIN/BREAST
Problem: Infection  Goal: Absence of Infection Signs and Symptoms  Outcome: Progressing     Problem: Adult Inpatient Plan of Care  Goal: Plan of Care Review  Outcome: Progressing  Goal: Patient-Specific Goal (Individualized)  Outcome: Progressing  Goal: Absence of Hospital-Acquired Illness or Injury  Outcome: Progressing  Goal: Optimal Comfort and Wellbeing  Outcome: Progressing  Goal: Readiness for Transition of Care  Outcome: Progressing     Problem: Diabetes Comorbidity  Goal: Blood Glucose Level Within Targeted Range  Outcome: Progressing     Problem: Wound  Goal: Optimal Coping  Outcome: Progressing  Goal: Optimal Functional Ability  Outcome: Progressing  Goal: Absence of Infection Signs and Symptoms  Outcome: Progressing  Goal: Improved Oral Intake  Outcome: Progressing  Goal: Optimal Pain Control and Function  Outcome: Progressing  Goal: Skin Health and Integrity  Outcome: Progressing  Goal: Optimal Wound Healing  Outcome: Progressing     Problem: Skin Injury Risk Increased  Goal: Skin Health and Integrity  Outcome: Progressing     Problem: Heart Failure  Goal: Optimal Coping  Outcome: Progressing  Goal: Optimal Cardiac Output  Outcome: Progressing  Goal: Stable Heart Rate and Rhythm  Outcome: Progressing  Goal: Optimal Functional Ability  Outcome: Progressing  Goal: Fluid and Electrolyte Balance  Outcome: Progressing  Goal: Improved Oral Intake  Outcome: Progressing  Goal: Effective Oxygenation and Ventilation  Outcome: Progressing  Goal: Effective Breathing Pattern During Sleep  Outcome: Progressing     Problem: Sepsis/Septic Shock  Goal: Optimal Coping  Outcome: Progressing  Goal: Absence of Bleeding  Outcome: Progressing  Goal: Blood Glucose Level Within Targeted Range  Outcome: Progressing  Goal: Absence of Infection Signs and Symptoms  Outcome: Progressing  Goal: Optimal Nutrition Intake  Outcome: Progressing      details…

## (undated) DEVICE — DRSG KLING 4"

## (undated) DEVICE — BLANKET WARMER UPPER ADULT

## (undated) DEVICE — BLADE SURGICAL #15 CARBON

## (undated) DEVICE — SUT ETHILON 4-0 18" PS-2

## (undated) DEVICE — FOR-TOURNIQUET 27679911: Type: DURABLE MEDICAL EQUIPMENT

## (undated) DEVICE — DRAPE EXTREMITY 87X128.5"

## (undated) DEVICE — WRAP COMPRESSION CALF LG

## (undated) DEVICE — DRSG WEBRIL 4"

## (undated) DEVICE — GLV 7.5 PROTEXIS

## (undated) DEVICE — SUT VICRYL 3-0 18" PS-2 UNDYED

## (undated) DEVICE — DRILL BIT WRIGHT MEDICAL SALVATION CANN 5MM

## (undated) DEVICE — DRAPE LIGHT HANDLE COVER BLUE

## (undated) DEVICE — FRAZIER SUCTION TIP 8FR

## (undated) DEVICE — WRAP COMPRESSION CALF BARIATRIC

## (undated) DEVICE — POSITIONER STRAP ARMBOARD VELCRO TS-30 12

## (undated) DEVICE — SOL IRR POUR NS 0.9% 500ML

## (undated) DEVICE — NDL HYPO REGULAR BEVEL 25G X 1.5"

## (undated) DEVICE — GLV 7.5 ESTEEM BLUE

## (undated) DEVICE — SUT BIOSYN 5-0 18" P-13

## (undated) DEVICE — FOR-ESU VALLEYLAB T7E15009DX: Type: DURABLE MEDICAL EQUIPMENT

## (undated) DEVICE — CANISTER DISPOSABLE THIN WALL 3000CC

## (undated) DEVICE — GOWN XL

## (undated) DEVICE — DRSG ESMARK 4"

## (undated) DEVICE — DRAPE HALF SHEET 40X57"

## (undated) DEVICE — DRSG STOCKINETTE TUBULAR COTTON 2PLY 6X60"

## (undated) DEVICE — CUFF TOURNIQUET 18" DUAL PORT LUER LOCK

## (undated) DEVICE — ELCTR GROUNDING PAD ADULT COVIDIEN

## (undated) DEVICE — SOL IRR POUR NS 0.9% 1500ML

## (undated) DEVICE — DRAPE C ARM MINI PACK FOR 6800

## (undated) DEVICE — DRSG ACE BANDAGE 4"

## (undated) DEVICE — WRAP COMPRESSION CALF MED

## (undated) DEVICE — SYR LUER LOK 10CC

## (undated) DEVICE — LABEL MED BLANK W PEN

## (undated) DEVICE — DRSG STOCKINETTE TUBULAR 6"

## (undated) DEVICE — PACK MINOR NO DRAPE

## (undated) DEVICE — NDL HYPO SAFE 18G X 1.5"

## (undated) DEVICE — PREP BETADINE SPONGE STICKS

## (undated) DEVICE — SUT VICRYL 4-0 18" PS-2 UNDYED

## (undated) DEVICE — DRSG ACE BANDAGE 4" NS

## (undated) DEVICE — PACK LIJ BASIC ORTHO

## (undated) DEVICE — PREP CHLORAPREP ORANGE 2PCT 26ML

## (undated) DEVICE — DRAPE TOWEL BLUE 17" X 24"

## (undated) DEVICE — WRIGHT MEDICAL PREPARATION KIT

## (undated) DEVICE — DRSG 4X4

## (undated) DEVICE — SUT MONOSOF 4-0 18" P-12

## (undated) DEVICE — SAW BLADE MICROAIRE SAGITTAL 9.4MMX25.4MMX0.6MM

## (undated) DEVICE — Device